# Patient Record
Sex: FEMALE | Race: BLACK OR AFRICAN AMERICAN | NOT HISPANIC OR LATINO | Employment: OTHER | URBAN - METROPOLITAN AREA
[De-identification: names, ages, dates, MRNs, and addresses within clinical notes are randomized per-mention and may not be internally consistent; named-entity substitution may affect disease eponyms.]

---

## 2023-03-21 DIAGNOSIS — I10 ESSENTIAL HYPERTENSION, BENIGN: ICD-10-CM

## 2023-03-21 DIAGNOSIS — E78.2 MIXED HYPERLIPIDEMIA: ICD-10-CM

## 2023-03-21 DIAGNOSIS — E11.9 DIABETES MELLITUS WITHOUT COMPLICATION: ICD-10-CM

## 2023-03-21 DIAGNOSIS — Z00.00 ENCOUNTER FOR MEDICARE ANNUAL WELLNESS EXAM: Primary | ICD-10-CM

## 2023-03-21 NOTE — PROGRESS NOTES
Patient called stating she needed lab orders put in for visit on 4/14. After reviewing patient's chart in [facility] (old system) patient's Medicare wellness is due. Wellness order put in and will be faxed to [facility] per patient's request. I made patient aware that orders were faxed.

## 2023-04-08 PROBLEM — I10 ESSENTIAL HYPERTENSION: Status: ACTIVE | Noted: 2023-04-08

## 2023-04-08 PROBLEM — E11.51 TYPE 2 DIABETES MELLITUS WITH DIABETIC PERIPHERAL ANGIOPATHY WITHOUT GANGRENE, WITH LONG-TERM CURRENT USE OF INSULIN: Status: ACTIVE | Noted: 2023-04-08

## 2023-04-08 PROBLEM — E78.2 MIXED HYPERLIPIDEMIA: Status: ACTIVE | Noted: 2023-04-08

## 2023-04-08 PROBLEM — F32.A ANXIETY AND DEPRESSION: Status: ACTIVE | Noted: 2023-04-08

## 2023-04-08 PROBLEM — Z79.4 TYPE 2 DIABETES MELLITUS WITH DIABETIC PERIPHERAL ANGIOPATHY WITHOUT GANGRENE, WITH LONG-TERM CURRENT USE OF INSULIN: Status: ACTIVE | Noted: 2023-04-08

## 2023-04-08 PROBLEM — M85.89 OSTEOPENIA OF MULTIPLE SITES: Status: ACTIVE | Noted: 2023-04-08

## 2023-04-08 PROBLEM — F41.9 ANXIETY AND DEPRESSION: Status: ACTIVE | Noted: 2023-04-08

## 2023-04-13 ENCOUNTER — TELEPHONE (OUTPATIENT)
Dept: FAMILY MEDICINE | Facility: CLINIC | Age: 78
End: 2023-04-13
Payer: MEDICARE

## 2023-04-13 DIAGNOSIS — I10 ESSENTIAL HYPERTENSION: ICD-10-CM

## 2023-04-13 DIAGNOSIS — E11.9 DIABETES MELLITUS WITHOUT COMPLICATION: Primary | ICD-10-CM

## 2023-04-13 DIAGNOSIS — E78.2 MIXED HYPERLIPIDEMIA: ICD-10-CM

## 2023-04-13 NOTE — TELEPHONE ENCOUNTER
----- Message from Colleen Yousif sent at 4/13/2023 11:23 AM CDT -----  Regarding: lab order  Pt requesting lab order to be completed for wellness visit to [facility] .

## 2023-04-14 LAB — HBA1C MFR BLD: 10 %

## 2023-04-20 ENCOUNTER — DOCUMENTATION ONLY (OUTPATIENT)
Dept: FAMILY MEDICINE | Facility: CLINIC | Age: 78
End: 2023-04-20
Payer: MEDICARE

## 2023-05-12 DIAGNOSIS — E11.59 TYPE 2 DIABETES MELLITUS WITH OTHER CIRCULATORY COMPLICATIONS: Primary | ICD-10-CM

## 2023-05-12 RX ORDER — POLYETHYLENE GLYCOL-3350 AND ELECTROLYTES 236; 6.74; 5.86; 2.97; 22.74 G/274.31G; G/274.31G; G/274.31G; G/274.31G; G/274.31G
POWDER, FOR SOLUTION ORAL
COMMUNITY
Start: 2023-04-19 | End: 2023-08-28

## 2023-05-12 RX ORDER — SERTRALINE HYDROCHLORIDE 25 MG/1
TABLET, FILM COATED ORAL
COMMUNITY
Start: 2023-02-24 | End: 2023-08-28

## 2023-05-12 RX ORDER — METFORMIN HYDROCHLORIDE 1000 MG/1
TABLET ORAL
COMMUNITY
Start: 2023-03-11 | End: 2023-09-13

## 2023-05-12 RX ORDER — BLOOD-GLUCOSE,RECEIVER,CONT
1 EACH MISCELLANEOUS
Qty: 2 EACH | Refills: 5 | Status: SHIPPED | OUTPATIENT
Start: 2023-05-12 | End: 2023-08-28

## 2023-05-12 RX ORDER — BLOOD-GLUCOSE TRANSMITTER
1 EACH MISCELLANEOUS
Qty: 2 EACH | Refills: 6 | Status: SHIPPED | OUTPATIENT
Start: 2023-05-12 | End: 2023-08-28

## 2023-05-12 RX ORDER — BLOOD-GLUCOSE SENSOR
1 EACH MISCELLANEOUS
Qty: 2 EACH | Refills: 6 | Status: SHIPPED | OUTPATIENT
Start: 2023-05-12 | End: 2023-08-28

## 2023-05-12 RX ORDER — OLMESARTAN MEDOXOMIL 40 MG/1
TABLET ORAL
COMMUNITY
Start: 2022-12-14 | End: 2023-08-28

## 2023-05-12 NOTE — TELEPHONE ENCOUNTER
Pt is asking for a prescription of the Dexcom. The one where she doesn't have to prink her finger.   Please advise she wants it sent to the [facility] pharmacy in [place].

## 2023-05-18 ENCOUNTER — TELEPHONE (OUTPATIENT)
Dept: FAMILY MEDICINE | Facility: CLINIC | Age: 78
End: 2023-05-18
Payer: MEDICARE

## 2023-05-24 ENCOUNTER — TELEPHONE (OUTPATIENT)
Dept: FAMILY MEDICINE | Facility: CLINIC | Age: 78
End: 2023-05-24

## 2023-05-24 NOTE — TELEPHONE ENCOUNTER
Patient called-concerns discussed-patient states that she has been taking her medication-patient instructed to make appointment-come in with her son to discuss concerns at length

## 2023-05-24 NOTE — TELEPHONE ENCOUNTER
----- Message from Colleen Yousif sent at 5/24/2023 10:27 AM CDT -----  Pt son would like doctor to give his mom a call the pt ms damaso gudino and talk to her because she is not taking her medication and he is concerned

## 2023-05-25 ENCOUNTER — TELEPHONE (OUTPATIENT)
Dept: FAMILY MEDICINE | Facility: CLINIC | Age: 78
End: 2023-05-25
Payer: MEDICARE

## 2023-05-25 NOTE — TELEPHONE ENCOUNTER
----- Message from Lilliana De La Torre MD sent at 5/22/2023  3:43 PM CDT -----  Regarding: RE: MRI orders  Please call patient and verify that she no longer wants to complete her MRI  ----- Message -----  From: Lucrecia Delong MA  Sent: 5/18/2023   9:08 AM CDT  To: Lilliana De La Torre MD  Subject: FW: MRI orders                                     ----- Message -----  From: Telma Rogers  Sent: 5/17/2023   3:56 PM CDT  To: Wil Mcintosh Staff  Subject: MRI orders                                       I am reaching out to see if this patient still needs to come in for MRI Brain? Orders were sent back in February and we were not able to reach patient. Please let me know if we need to try and schedule.

## 2023-06-07 RX ORDER — FLASH GLUCOSE SENSOR
1 KIT MISCELLANEOUS DAILY
Qty: 2 KIT | Refills: 6 | Status: SHIPPED | OUTPATIENT
Start: 2023-06-07 | End: 2024-01-08

## 2023-06-07 NOTE — TELEPHONE ENCOUNTER
Patient's son is requesting a refill on Free Style 2 sensors to go to the [facility] in [place].     He also stated that patient would like a call back to discuss MRI Results of her brain

## 2023-08-28 ENCOUNTER — OFFICE VISIT (OUTPATIENT)
Dept: FAMILY MEDICINE | Facility: CLINIC | Age: 78
End: 2023-08-28
Payer: MEDICARE

## 2023-08-28 VITALS
WEIGHT: 138 LBS | DIASTOLIC BLOOD PRESSURE: 74 MMHG | HEART RATE: 77 BPM | TEMPERATURE: 98 F | HEIGHT: 64 IN | SYSTOLIC BLOOD PRESSURE: 143 MMHG | OXYGEN SATURATION: 99 % | BODY MASS INDEX: 23.56 KG/M2

## 2023-08-28 DIAGNOSIS — I10 ESSENTIAL HYPERTENSION: ICD-10-CM

## 2023-08-28 DIAGNOSIS — E11.51 TYPE 2 DIABETES MELLITUS WITH DIABETIC PERIPHERAL ANGIOPATHY WITHOUT GANGRENE, WITH LONG-TERM CURRENT USE OF INSULIN: ICD-10-CM

## 2023-08-28 DIAGNOSIS — E78.2 MIXED HYPERLIPIDEMIA: ICD-10-CM

## 2023-08-28 DIAGNOSIS — M85.89 OSTEOPENIA OF MULTIPLE SITES: ICD-10-CM

## 2023-08-28 DIAGNOSIS — Z79.4 TYPE 2 DIABETES MELLITUS WITH DIABETIC PERIPHERAL ANGIOPATHY WITHOUT GANGRENE, WITH LONG-TERM CURRENT USE OF INSULIN: ICD-10-CM

## 2023-08-28 DIAGNOSIS — Z12.31 ENCOUNTER FOR SCREENING MAMMOGRAM FOR MALIGNANT NEOPLASM OF BREAST: ICD-10-CM

## 2023-08-28 DIAGNOSIS — Z00.00 WELLNESS EXAMINATION: Primary | ICD-10-CM

## 2023-08-28 DIAGNOSIS — F41.9 ANXIETY AND DEPRESSION: ICD-10-CM

## 2023-08-28 DIAGNOSIS — F32.A ANXIETY AND DEPRESSION: ICD-10-CM

## 2023-08-28 PROCEDURE — 3077F SYST BP >= 140 MM HG: CPT | Mod: CPTII,,, | Performed by: FAMILY MEDICINE

## 2023-08-28 PROCEDURE — G0439 PPPS, SUBSEQ VISIT: HCPCS | Mod: ,,, | Performed by: FAMILY MEDICINE

## 2023-08-28 PROCEDURE — 1160F PR REVIEW ALL MEDS BY PRESCRIBER/CLIN PHARMACIST DOCUMENTED: ICD-10-PCS | Mod: CPTII,,, | Performed by: FAMILY MEDICINE

## 2023-08-28 PROCEDURE — 1101F PT FALLS ASSESS-DOCD LE1/YR: CPT | Mod: CPTII,,, | Performed by: FAMILY MEDICINE

## 2023-08-28 PROCEDURE — 1159F PR MEDICATION LIST DOCUMENTED IN MEDICAL RECORD: ICD-10-PCS | Mod: CPTII,,, | Performed by: FAMILY MEDICINE

## 2023-08-28 PROCEDURE — 3288F FALL RISK ASSESSMENT DOCD: CPT | Mod: CPTII,,, | Performed by: FAMILY MEDICINE

## 2023-08-28 PROCEDURE — 3288F PR FALLS RISK ASSESSMENT DOCUMENTED: ICD-10-PCS | Mod: CPTII,,, | Performed by: FAMILY MEDICINE

## 2023-08-28 PROCEDURE — 1160F RVW MEDS BY RX/DR IN RCRD: CPT | Mod: CPTII,,, | Performed by: FAMILY MEDICINE

## 2023-08-28 PROCEDURE — 3078F PR MOST RECENT DIASTOLIC BLOOD PRESSURE < 80 MM HG: ICD-10-PCS | Mod: CPTII,,, | Performed by: FAMILY MEDICINE

## 2023-08-28 PROCEDURE — 1101F PR PT FALLS ASSESS DOC 0-1 FALLS W/OUT INJ PAST YR: ICD-10-PCS | Mod: CPTII,,, | Performed by: FAMILY MEDICINE

## 2023-08-28 PROCEDURE — 3078F DIAST BP <80 MM HG: CPT | Mod: CPTII,,, | Performed by: FAMILY MEDICINE

## 2023-08-28 PROCEDURE — 1159F MED LIST DOCD IN RCRD: CPT | Mod: CPTII,,, | Performed by: FAMILY MEDICINE

## 2023-08-28 PROCEDURE — G0439 PR MEDICARE ANNUAL WELLNESS SUBSEQUENT VISIT: ICD-10-PCS | Mod: ,,, | Performed by: FAMILY MEDICINE

## 2023-08-28 PROCEDURE — 3077F PR MOST RECENT SYSTOLIC BLOOD PRESSURE >= 140 MM HG: ICD-10-PCS | Mod: CPTII,,, | Performed by: FAMILY MEDICINE

## 2023-08-28 NOTE — PROGRESS NOTES
Patient ID: [id number]     Chief Complaint: Medicare AWV        HPI:     Tara Parker is a 77 y.o. female here today with her son for a Medicare Wellness.   Patient states she was hospitalized at [facility]-physician at the hospital told her to stop all her medications except for metformin-patient has not been taking any medication other than metformin 1000 mg twice a day.  Has not been monitoring her blood pressure.  Overall feels that she is doing great.  Breast Cancer Screening - Mammogram orders given to patient  Colon Cancer Screening - Colonoscopy  up-to-date  Osteoporosis Screening - DEXA  orders given to patient  Eye Exam - Recommend annually.  Dental Exam - Recommend biannually  Vaccinations -   Immunization History   Administered Date(s) Administered    COVID-19, MRNA, LN-S, PF (MODERNA FULL 0.5 ML DOSE) 04/19/2022    COVID-19, MRNA, LN-S, PF (Pfizer) (Purple Cap) 01/20/2021, 02/10/2021    Influenza - Quadrivalent - High Dose - PF (65 years and older) 10/06/2020, 10/03/2021    Influenza A (H1N1) 2009 Monovalent - IM 01/07/2010    Zoster Recombinant 09/18/2019, 10/17/2021        A separate E/M code has been provided to evaluate additional complaints that the patient would like addressed during the dedicated Medicare Wellness Exam.    Past Medical History:   Diagnosis Date    Anxiety and depression 4/8/2023    Essential hypertension 4/8/2023    Mixed hyperlipidemia 4/8/2023    Osteopenia of multiple sites 4/8/2023    Type 2 diabetes mellitus with diabetic peripheral angiopathy without gangrene, with long-term current use of insulin 4/8/2023        Past Surgical History:   Procedure Laterality Date    GI obstruction N/A     HYSTERECTOMY          Social History     Tobacco Use    Smoking status: Never    Smokeless tobacco: Never   Substance Use Topics    Alcohol use: Not Currently    Drug use: Never        Social History     Socioeconomic History    Marital status:     Number of children: 3         Current Outpatient Medications   Medication Instructions    flash glucose sensor (FREESTYLE NISHA 2 SENSOR) Kit 1 kit, Misc.(Non-Drug; Combo Route), Daily    metFORMIN (GLUCOPHAGE) 1000 MG tablet Oral       Review of patient's allergies indicates:  No Known Allergies     Patient Care Team:  Lilliana De La Torre MD as PCP - General (Family Medicine)     Subjective:     Review of Systems    12 point review of systems conducted, negative except as stated in the history of present illness. See HPI for details.    Patient Reported Health Risk Assessments:  What [age] your age[age] 70-79  Are you male or female?: Female  During the past four weeks, how much have you been bothered by emotional problems such as feeling anxious, depressed, irritable, sad, or downhearted and blue?: Not at all  During the past five weeks, has your physical and/or emotional health limited your social activities with family, friends, neighbors, or groups?: Not at all  During the past four weeks, how much bodily pain have you generally had?: No pain  During the past four weeks, was someone available to help if you needed and wanted help?: Yes, as much as I wanted  During the past four weeks, what was the hardest physical activity you could do for at least two minutes?: Moderate  Can you get to places out of walking distance without help?  (For example, can you travel alone on buses or taxis, or drive your own car?): Yes  Can you go shopping for groceries or clothes without someone's help?: Yes  Can you prepare your own meals?: Yes  Can you do your own housework without help?: Yes  Because of any health problems, do you need the help of another person with your personal care needs such as eating, bathing, dressing, or getting around the house?: No  Can you handle your own money without help?: Yes  During the past four weeks, how would you rate your health in general?: Very good  How have things been going for you during the past four weeks?: Very  "well  Are you having difficulties driving your car?: Yes, often  Do you always fasten your seat belt when you are in a car?: Yes, usually  How often in the past four weeks have you been bothered by falling or dizzy when standing up?: Never  How often in the past four weeks have you been bothered by sexual problems?: Never  How often in the past four weeks have you been bothered by trouble eating well?: Never  How often in the past four weeks have you been bothered by teeth or denture problems?: Never  How often in the past four weeks have you been bothered with problems using the telephone?: Never  How often in the past four weeks have you been bothered by tiredness or fatigue?: Never  Have you fallen two or more times in the past year?: No  Are you afraid of falling?: No  Are you a smoker?: No  During the past four weeks, how many drinks of wine, beer, or other alcoholic beverages did you have?: No alcohol at all  Do you exercise for about 20 minutes three or more days a week?: Yes, some of the time  Have you been given any information to help you with hazards in your house that might hurt you?: Yes  Have you been given any information to help you with keeping track of your medications?: Yes  How often do you have trouble taking medicines the way you've been told to take them?: I always take them as prescribed  How confident are you that you can control and manage most of your health problems?: Very confident  What is your race? (Check all that apply.):     Objective:     Visit Vitals  BP (!) 143/74   Pulse 77   Temp 98.2 °F (36.8 °C)   Ht 5' 4" (1.626 m)   Wt 62.6 kg (138 lb)   SpO2 99%   BMI 23.69 kg/m²       Physical Exam    General: Alert and oriented, No acute distress.  Head: Normocephalic, Atraumatic.  Eye: Pupils are equal, round and reactive to light, Extraocular movements are intact, Sclera non-icteric.  Ears/Nose/Throat: Normal, Mucosa moist,Clear.  Neck/Thyroid: Supple, Non-tender, No " carotid bruit, No palpable thyromegaly or thyroid nodule, No lymphadenopathy, No JVD, Full range of motion.  Respiratory: Clear to auscultation bilaterally; No wheezes, rales or rhonchi, Non-labored respirations, Symmetrical chest wall expansion.  Cardiovascular: Regular rate and rhythm  Gastrointestinal: Soft, Non-tender, Non-distended, Normal bowel sounds, No palpable organomegaly.  Musculoskeletal: Normal range of motion.  Integumentary: Warm, Dry, Intact, No suspicious lesions or rashes.  Protective Sensation (w/ 10 gram monofilament):  Right: Intact  Left: Intact    Visual Inspection:  Normal -  Bilateral    Pedal Pulses:   Right: Present  Left: Present    Posterior Tibialis Pulses:   Right:Present  Left: Present     Extremities: No clubbing, cyanosis or edema  Neurologic: No focal deficits, Cranial Nerves II-XII are grossly intact, Motor strength normal upper and lower extremities, Sensory exam intact.  Psychiatric: Normal interaction, Coherent speech, Euthymic mood, Appropriate affect       Assessment:       ICD-10-CM ICD-9-CM   1. Wellness examination  Z00.00 V70.0   2. Type 2 diabetes mellitus with diabetic peripheral angiopathy without gangrene, with long-term current use of insulin  E11.51 250.70    Z79.4 443.81     V58.67   3. Essential hypertension  I10 401.9   4. Mixed hyperlipidemia  E78.2 272.2   5. Anxiety and depression  F41.9 300.00    F32.A 311   6. Osteopenia of multiple sites  M85.89 733.90        Plan:     1. Wellness examination  Patient presented to office today for their Medicare Annual Wellness Visit.    Education was provided on health nutrition, including a diet nayan in fruits and vegetables, minimizing simple carbohydrates, salt, and saturated fats.  Encouraged regular cardiovascular exercise such as walking at lease 30 minutes daily, 5 times per week.  Emphasized preventive health measures and educated pt on fall prevention and community-based lifestule interventions to help reduce  health risks and promote healthy living.     2. Type 2 diabetes mellitus with diabetic peripheral angiopathy without gangrene, with long-term current use of insulin  Check studies management based upon results.  Pathophysiology/treatment/dangers discussed.   - Hemoglobin A1C; Future  - Urinalysis; Future  - Hepatitis C Antibody; Future  - Microalbumin/Creatinine Ratio, Urine; Future  - Urinalysis    3. Essential hypertension  Pathophysiology/Treatment/ Dangers Discussed.  Patient to monitor blood pressure at home bring readings to next visit.  - CBC Auto Differential; Future  - Comprehensive Metabolic Panel; Future    4. Mixed hyperlipidemia  Check studies management based upon results.  Pathophysiology/treatment/dangers discussed.   - Lipid Panel; Future  - TSH; Future    5. Anxiety and depression  Pathophysiology/Treatment/ Dangers Discussed.  Patient feels that she does not need to continue to take medication-has been doing well.    6. Osteopenia of multiple sites  Check studies management based upon results.  Pathophysiology/treatment/dangers discussed.   - DXA Bone Density Axial Skeleton 1 or more sites; Future    7. Encounter for screening mammogram for malignant neoplasm of breast  Check studies management based upon results.  Pathophysiology/treatment/dangers discussed.   - Mammo Digital Screening Bilat; Future          Fall Risk + Home Safety + Living Situation + Whisper Test + Depression Screen + CAGE + Cognitive Impairment Screen + ADL Screen + Timed Get Up and Go + Nutrition Screen + PAQ Screen + Health Risk Assessment all reviewed.          No data to display                  8/28/2023    12:30 PM   Fall Risk Assessment - Outpatient   Mobility Status Ambulatory   Number of falls 0   Identified as fall risk False                   Depression Screening  Over the past two weeks, has the patient felt down, depressed, or hopeless?: No  Over the past two weeks, has the patient felt little interest or pleasure  in doing things?: No  Functional Ability/Safety Screening  Was the patient's timed Up & Go test unsteady or longer than 30 seconds?: No  Does the patient need help with phone, transportation, shopping, preparing meals, housework, laundry, meds, or managing money?: No  Does the patient's home have rugs in the hallway, lack grab bars in the bathroom, lack handrails on the stairs or have poor lighting?: No  Have you noticed any hearing difficulties?: No  Cognitive Function (Assessed through direct observation with due consideration of information obtained by way of patient reports and/or concerns raised by family, friends, caretakers, or others)    Does the patient repeat questions/statements in the same day?: No  Does the patient have trouble remembering the date, year, and time?: No  Does the patient have difficulty managing finances?: No  Does the patient have a decreased sense of direction?: No      Offer of free  was accepted or rejected?: rejected    CVD Risk Factors - Reviewed  Obesity/Physical Activity - Normal BMI. Encouraged daily 30 minute physical activity x 5 days per week.    Opioid Screening: Patient medication list reviewed, patient is not taking prescription opioids. Patient is not using additional opioids than prescribed. Patient is at low risk of substance abuse based on this opioid use history.     Advance Directives:   Living Will: No        Oral Declaration: No    LaPOST: No    Do Not Resuscitate Status: No    Medical Power of : No        Oral Declaration: No      Decision Making:  Patient answered questions  Goals of Care: The patient endorses that what is most important right now is to focus on quality of life, even if it means sacrificing a little time    Accordingly, we have decided that the best plan to meet the patient's goals includes continuing with treatment    Advance Care Planning   Advanced Care Planning: I offered to discuss Advanced Care Planning, which  consists of how you would like to be cared for as you end the near of your natural life.  This includes how to pick a person who would make decisions for you if you were unable to make them for yourself, which is called a Medical Power of . These discussions include what kind of decisions you will make regarding life sustaining measures, such as ventilators and feeding tubes, when faced with a life limiting illness recorded on a living will that they will need to know.           The patient's Health Maintenance was reviewed and the following appears to be due at this time:   Health Maintenance Due   Topic Date Due    Hepatitis C Screening  Never done    Lipid Panel  Never done    TETANUS VACCINE  Never done    DEXA Scan  Never done    Pneumococcal Vaccines (Age 65+) (1 - PCV) Never done    COVID-19 Vaccine (4 - Pfizer series) 06/14/2022         Follow up in about 2 weeks (around 9/11/2023) for Diabetes. In addition to their scheduled follow up, the patient has also been instructed to follow up on as needed basis.     Future Appointments   Date Time Provider Department Center   9/20/2023  4:00 PM Lilliana De La Torre MD [facility]        Provided patient with a 5-10 year written screening schedule and personal prevention plan. Recommendations were developed using the USPSTF age appropriate recommendations. Education, counseling, and referrals were provided as needed. After Visit Summary printed and given to patient which includes a list of additional screenings\tests needed.    Lilliana De La Torre MD

## 2023-08-29 ENCOUNTER — PATIENT OUTREACH (OUTPATIENT)
Dept: ADMINISTRATIVE | Facility: HOSPITAL | Age: 78
End: 2023-08-29
Payer: MEDICARE

## 2023-08-29 LAB
ALBUMIN SERPL-MCNC: 4 G/DL (ref 3.8–4.8)
ALBUMIN/CREAT UR: 173 MG/G CREAT (ref 0–29)
ALBUMIN/GLOB SERPL: 1.4 {RATIO} (ref 1.2–2.2)
ALP SERPL-CCNC: 103 IU/L (ref 44–121)
ALT SERPL-CCNC: 7 IU/L (ref 0–32)
APPEARANCE UR: ABNORMAL
AST SERPL-CCNC: 21 IU/L (ref 0–40)
BACTERIA #/AREA URNS HPF: ABNORMAL /[HPF]
BASOPHILS # BLD AUTO: 0 X10E3/UL (ref 0–0.2)
BASOPHILS NFR BLD AUTO: 0 %
BILIRUB SERPL-MCNC: 0.2 MG/DL (ref 0–1.2)
BILIRUB UR QL STRIP: NEGATIVE
BUN SERPL-MCNC: 18 MG/DL (ref 8–27)
BUN/CREAT SERPL: 18 (ref 12–28)
CALCIUM SERPL-MCNC: 9.4 MG/DL (ref 8.7–10.3)
CHLORIDE SERPL-SCNC: 97 MMOL/L (ref 96–106)
CHOLEST SERPL-MCNC: 260 MG/DL (ref 100–199)
CO2 SERPL-SCNC: 22 MMOL/L (ref 20–29)
COLOR UR: YELLOW
CREAT SERPL-MCNC: 0.99 MG/DL (ref 0.57–1)
CREAT UR-MCNC: 150.4 MG/DL
CRYSTALS URNS MICRO: ABNORMAL
EOSINOPHIL # BLD AUTO: 0 X10E3/UL (ref 0–0.4)
EOSINOPHIL NFR BLD AUTO: 1 %
EPI CELLS #/AREA URNS HPF: ABNORMAL /HPF (ref 0–10)
ERYTHROCYTE [DISTWIDTH] IN BLOOD BY AUTOMATED COUNT: 13.6 % (ref 11.7–15.4)
EST. GFR  (NO RACE VARIABLE): 59 ML/MIN/1.73
GLOBULIN SER CALC-MCNC: 2.9 G/DL (ref 1.5–4.5)
GLUCOSE SERPL-MCNC: 277 MG/DL (ref 70–99)
GLUCOSE UR QL STRIP: ABNORMAL
HBA1C MFR BLD: 9.7 % (ref 4.8–5.6)
HCT VFR BLD AUTO: 34.3 % (ref 34–46.6)
HCV IGG SERPL QL IA: NON REACTIVE
HDLC SERPL-MCNC: 54 MG/DL
HGB BLD-MCNC: 10.9 G/DL (ref 11.1–15.9)
HGB UR QL STRIP: ABNORMAL
KETONES UR QL STRIP: NEGATIVE
LDLC SERPL CALC-MCNC: 183 MG/DL (ref 0–99)
LEUKOCYTE ESTERASE UR QL STRIP: NEGATIVE
LYMPHOCYTES # BLD AUTO: 2.2 X10E3/UL (ref 0.7–3.1)
LYMPHOCYTES NFR BLD AUTO: 55 %
MCH RBC QN AUTO: 26.9 PG (ref 26.6–33)
MCHC RBC AUTO-ENTMCNC: 31.8 G/DL (ref 31.5–35.7)
MCV RBC AUTO: 85 FL (ref 79–97)
MICRO URNS: ABNORMAL
MICROALBUMIN UR-MCNC: 259.6 UG/ML
MONOCYTES # BLD AUTO: 0.4 X10E3/UL (ref 0.1–0.9)
MONOCYTES NFR BLD AUTO: 10 %
NEUTROPHILS # BLD AUTO: 1.4 X10E3/UL (ref 1.4–7)
NEUTROPHILS NFR BLD AUTO: 34 %
NITRITE UR QL STRIP: NEGATIVE
PH UR STRIP: 5.5 [PH] (ref 5–7.5)
PLATELET # BLD AUTO: 213 X10E3/UL (ref 150–450)
POTASSIUM SERPL-SCNC: 3.9 MMOL/L (ref 3.5–5.2)
PROT SERPL-MCNC: 6.9 G/DL (ref 6–8.5)
PROT UR QL STRIP: ABNORMAL
RBC # BLD AUTO: 4.05 X10E6/UL (ref 3.77–5.28)
RBC #/AREA URNS HPF: ABNORMAL /HPF (ref 0–2)
SODIUM SERPL-SCNC: 136 MMOL/L (ref 134–144)
SP GR UR STRIP: 1.02 (ref 1–1.03)
SPECIMEN STATUS REPORT: NORMAL
TRIGL SERPL-MCNC: 129 MG/DL (ref 0–149)
TSH SERPL DL<=0.005 MIU/L-ACNC: 1.49 UIU/ML (ref 0.45–4.5)
UROBILINOGEN UR STRIP-MCNC: 0.2 MG/DL (ref 0.2–1)
VLDLC SERPL CALC-MCNC: 23 MG/DL (ref 5–40)
WBC # BLD AUTO: 4 X10E3/UL (ref 3.4–10.8)
WBC #/AREA URNS HPF: ABNORMAL /HPF (ref 0–5)

## 2023-08-29 NOTE — LETTER
"  This communication is flagged as high priority.        AUTHORIZATION FOR RELEASE OF   CONFIDENTIAL INFORMATION    Dear Truong Orta,    We are seeing Tara Parker, date of birth 1945, in the clinic at [facility]. Lilliana De La Torre MD is the patient's PCP. Tara Parker has an outstanding lab/procedure at the time we reviewed her chart. In order to help keep her health information updated, she has authorized us to request the following medical record(s):        (  )  MAMMOGRAM                                      (  )  COLONOSCOPY      (  )  PAP SMEAR                                          (  )  OUTSIDE LAB RESULTS     (  )  DEXA SCAN                                          ( xx )  DM EYE EXAM   most recent         (  )  FOOT EXAM                                          (  )  ENTIRE RECORD     (  )  OUTSIDE IMMUNIZATIONS                 (  )  _______________         Please fax records to [facility], Gautam, Indira, MD,  [phone]      If you have any questions, please contact Oskar Varner (Connie)Care Coordinator @ [phone]     Patient Name: Tara Parker  : 1945  Patient Phone #: [phone]     "

## 2023-08-29 NOTE — LETTER
"  This communication is flagged as high priority.        AUTHORIZATION FOR RELEASE OF   CONFIDENTIAL INFORMATION    Dear [facility],    We are seeing Tara Parker, date of birth 1945, in the clinic at [facility]. Lilliana De La Torre MD is the patient's PCP. Tara Parker has an outstanding lab/procedure at the time we reviewed her chart. In order to help keep her health information updated, she has authorized us to request the following medical record(s):        (  )  MAMMOGRAM                                      (  )  COLONOSCOPY      (  )  PAP SMEAR                                          (  )  OUTSIDE LAB RESULTS     (  )  DEXA SCAN                                          (xx  ) DM  EYE EXAM            (  )  FOOT EXAM                                          (  )  ENTIRE RECORD     (  )  OUTSIDE IMMUNIZATIONS                 (  )  _______________         Please fax records to [facility], Gautam, Indira, MD,  [phone]        If you have any questions, please contact Oskar Varner (Connie)Care Coordinator @ [phone]     Patient Name: Tara Parker  : 1945  Patient Phone #: [phone]     "

## 2023-08-31 ENCOUNTER — OFFICE VISIT (OUTPATIENT)
Dept: FAMILY MEDICINE | Facility: CLINIC | Age: 78
End: 2023-08-31
Payer: MEDICARE

## 2023-08-31 VITALS
SYSTOLIC BLOOD PRESSURE: 132 MMHG | WEIGHT: 135.63 LBS | OXYGEN SATURATION: 100 % | HEART RATE: 78 BPM | BODY MASS INDEX: 23.15 KG/M2 | DIASTOLIC BLOOD PRESSURE: 75 MMHG | TEMPERATURE: 98 F | HEIGHT: 64 IN

## 2023-08-31 DIAGNOSIS — I10 ESSENTIAL HYPERTENSION: ICD-10-CM

## 2023-08-31 DIAGNOSIS — Z79.4 TYPE 2 DIABETES MELLITUS WITH DIABETIC PERIPHERAL ANGIOPATHY WITHOUT GANGRENE, WITH LONG-TERM CURRENT USE OF INSULIN: ICD-10-CM

## 2023-08-31 DIAGNOSIS — E11.51 TYPE 2 DIABETES MELLITUS WITH DIABETIC PERIPHERAL ANGIOPATHY WITHOUT GANGRENE, WITH LONG-TERM CURRENT USE OF INSULIN: ICD-10-CM

## 2023-08-31 DIAGNOSIS — R41.82 ACUTE ALTERATION IN MENTAL STATUS: Primary | ICD-10-CM

## 2023-08-31 DIAGNOSIS — E78.2 MIXED HYPERLIPIDEMIA: ICD-10-CM

## 2023-08-31 PROCEDURE — 3078F DIAST BP <80 MM HG: CPT | Mod: CPTII,,, | Performed by: FAMILY MEDICINE

## 2023-08-31 PROCEDURE — 3288F FALL RISK ASSESSMENT DOCD: CPT | Mod: CPTII,,, | Performed by: FAMILY MEDICINE

## 2023-08-31 PROCEDURE — 1159F PR MEDICATION LIST DOCUMENTED IN MEDICAL RECORD: ICD-10-PCS | Mod: CPTII,,, | Performed by: FAMILY MEDICINE

## 2023-08-31 PROCEDURE — 1101F PR PT FALLS ASSESS DOC 0-1 FALLS W/OUT INJ PAST YR: ICD-10-PCS | Mod: CPTII,,, | Performed by: FAMILY MEDICINE

## 2023-08-31 PROCEDURE — 3075F PR MOST RECENT SYSTOLIC BLOOD PRESS GE 130-139MM HG: ICD-10-PCS | Mod: CPTII,,, | Performed by: FAMILY MEDICINE

## 2023-08-31 PROCEDURE — 99214 PR OFFICE/OUTPT VISIT, EST, LEVL IV, 30-39 MIN: ICD-10-PCS | Mod: ,,, | Performed by: FAMILY MEDICINE

## 2023-08-31 PROCEDURE — 3288F PR FALLS RISK ASSESSMENT DOCUMENTED: ICD-10-PCS | Mod: CPTII,,, | Performed by: FAMILY MEDICINE

## 2023-08-31 PROCEDURE — 1160F RVW MEDS BY RX/DR IN RCRD: CPT | Mod: CPTII,,, | Performed by: FAMILY MEDICINE

## 2023-08-31 PROCEDURE — 1160F PR REVIEW ALL MEDS BY PRESCRIBER/CLIN PHARMACIST DOCUMENTED: ICD-10-PCS | Mod: CPTII,,, | Performed by: FAMILY MEDICINE

## 2023-08-31 PROCEDURE — 1101F PT FALLS ASSESS-DOCD LE1/YR: CPT | Mod: CPTII,,, | Performed by: FAMILY MEDICINE

## 2023-08-31 PROCEDURE — 99214 OFFICE O/P EST MOD 30 MIN: CPT | Mod: ,,, | Performed by: FAMILY MEDICINE

## 2023-08-31 PROCEDURE — 1159F MED LIST DOCD IN RCRD: CPT | Mod: CPTII,,, | Performed by: FAMILY MEDICINE

## 2023-08-31 PROCEDURE — 3075F SYST BP GE 130 - 139MM HG: CPT | Mod: CPTII,,, | Performed by: FAMILY MEDICINE

## 2023-08-31 PROCEDURE — 3078F PR MOST RECENT DIASTOLIC BLOOD PRESSURE < 80 MM HG: ICD-10-PCS | Mod: CPTII,,, | Performed by: FAMILY MEDICINE

## 2023-09-01 ENCOUNTER — TELEPHONE (OUTPATIENT)
Dept: FAMILY MEDICINE | Facility: CLINIC | Age: 78
End: 2023-09-01
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
## 2023-09-07 ENCOUNTER — OFFICE VISIT (OUTPATIENT)
Dept: FAMILY MEDICINE | Facility: CLINIC | Age: 78
End: 2023-09-07
Payer: MEDICARE

## 2023-09-07 VITALS
OXYGEN SATURATION: 100 % | TEMPERATURE: 98 F | SYSTOLIC BLOOD PRESSURE: 156 MMHG | BODY MASS INDEX: 23.5 KG/M2 | DIASTOLIC BLOOD PRESSURE: 80 MMHG | HEART RATE: 76 BPM | HEIGHT: 64 IN | WEIGHT: 137.63 LBS

## 2023-09-07 DIAGNOSIS — F32.A ANXIETY AND DEPRESSION: ICD-10-CM

## 2023-09-07 DIAGNOSIS — I10 ESSENTIAL HYPERTENSION: ICD-10-CM

## 2023-09-07 DIAGNOSIS — R29.818 NEUROCOGNITIVE DEFICITS: ICD-10-CM

## 2023-09-07 DIAGNOSIS — E11.51 TYPE 2 DIABETES MELLITUS WITH DIABETIC PERIPHERAL ANGIOPATHY WITHOUT GANGRENE, WITH LONG-TERM CURRENT USE OF INSULIN: Primary | ICD-10-CM

## 2023-09-07 DIAGNOSIS — E78.2 MIXED HYPERLIPIDEMIA: ICD-10-CM

## 2023-09-07 DIAGNOSIS — R41.89 NEUROCOGNITIVE DEFICITS: ICD-10-CM

## 2023-09-07 DIAGNOSIS — Z79.4 TYPE 2 DIABETES MELLITUS WITH DIABETIC PERIPHERAL ANGIOPATHY WITHOUT GANGRENE, WITH LONG-TERM CURRENT USE OF INSULIN: Primary | ICD-10-CM

## 2023-09-07 DIAGNOSIS — F41.9 ANXIETY AND DEPRESSION: ICD-10-CM

## 2023-09-07 PROCEDURE — 3288F FALL RISK ASSESSMENT DOCD: CPT | Mod: CPTII,,, | Performed by: FAMILY MEDICINE

## 2023-09-07 PROCEDURE — 3077F SYST BP >= 140 MM HG: CPT | Mod: CPTII,,, | Performed by: FAMILY MEDICINE

## 2023-09-07 PROCEDURE — 1159F MED LIST DOCD IN RCRD: CPT | Mod: CPTII,,, | Performed by: FAMILY MEDICINE

## 2023-09-07 PROCEDURE — 3288F PR FALLS RISK ASSESSMENT DOCUMENTED: ICD-10-PCS | Mod: CPTII,,, | Performed by: FAMILY MEDICINE

## 2023-09-07 PROCEDURE — 3079F PR MOST RECENT DIASTOLIC BLOOD PRESSURE 80-89 MM HG: ICD-10-PCS | Mod: CPTII,,, | Performed by: FAMILY MEDICINE

## 2023-09-07 PROCEDURE — 99214 OFFICE O/P EST MOD 30 MIN: CPT | Mod: ,,, | Performed by: FAMILY MEDICINE

## 2023-09-07 PROCEDURE — 3079F DIAST BP 80-89 MM HG: CPT | Mod: CPTII,,, | Performed by: FAMILY MEDICINE

## 2023-09-07 PROCEDURE — 1101F PT FALLS ASSESS-DOCD LE1/YR: CPT | Mod: CPTII,,, | Performed by: FAMILY MEDICINE

## 2023-09-07 PROCEDURE — 1160F RVW MEDS BY RX/DR IN RCRD: CPT | Mod: CPTII,,, | Performed by: FAMILY MEDICINE

## 2023-09-07 PROCEDURE — 99214 PR OFFICE/OUTPT VISIT, EST, LEVL IV, 30-39 MIN: ICD-10-PCS | Mod: ,,, | Performed by: FAMILY MEDICINE

## 2023-09-07 PROCEDURE — 1101F PR PT FALLS ASSESS DOC 0-1 FALLS W/OUT INJ PAST YR: ICD-10-PCS | Mod: CPTII,,, | Performed by: FAMILY MEDICINE

## 2023-09-07 PROCEDURE — 1159F PR MEDICATION LIST DOCUMENTED IN MEDICAL RECORD: ICD-10-PCS | Mod: CPTII,,, | Performed by: FAMILY MEDICINE

## 2023-09-07 PROCEDURE — 1160F PR REVIEW ALL MEDS BY PRESCRIBER/CLIN PHARMACIST DOCUMENTED: ICD-10-PCS | Mod: CPTII,,, | Performed by: FAMILY MEDICINE

## 2023-09-07 PROCEDURE — 3077F PR MOST RECENT SYSTOLIC BLOOD PRESSURE >= 140 MM HG: ICD-10-PCS | Mod: CPTII,,, | Performed by: FAMILY MEDICINE

## 2023-09-07 RX ORDER — PRAVASTATIN SODIUM 40 MG/1
40 TABLET ORAL DAILY
Qty: 90 TABLET | Refills: 1 | Status: SHIPPED | OUTPATIENT
Start: 2023-09-07 | End: 2023-09-13

## 2023-09-07 RX ORDER — SERTRALINE HYDROCHLORIDE 25 MG/1
25 TABLET, FILM COATED ORAL DAILY
Qty: 90 TABLET | Refills: 0 | Status: SHIPPED | OUTPATIENT
Start: 2023-09-07 | End: 2023-12-01 | Stop reason: SDUPTHER

## 2023-09-07 RX ORDER — OLMESARTAN MEDOXOMIL 40 MG/1
40 TABLET ORAL DAILY
Qty: 90 TABLET | Refills: 3 | Status: SHIPPED | OUTPATIENT
Start: 2023-09-07 | End: 2023-12-11 | Stop reason: SDUPTHER

## 2023-09-07 RX ORDER — INSULIN GLARGINE 100 [IU]/ML
40 INJECTION, SOLUTION SUBCUTANEOUS DAILY
Qty: 12 ML | Refills: 1 | Status: SHIPPED | OUTPATIENT
Start: 2023-09-07 | End: 2023-10-11 | Stop reason: SDUPTHER

## 2023-09-08 NOTE — PROGRESS NOTES
"   Patient ID: [id number]     Chief Complaint: Diabetes      HPI:     Tara Parker is a 78 y.o. female here today with her son  for a follow up.  After evaluation by healthcare provider at [facility]-patient's son decided against admission to [facility]-would like to take care of mother at home.  Patient is only taking metformin 1000 mg twice a day  Past Medical History:   Diagnosis Date    Anxiety and depression 4/8/2023    Essential hypertension 4/8/2023    Mixed hyperlipidemia 4/8/2023    Osteopenia of multiple sites 4/8/2023    Type 2 diabetes mellitus with diabetic peripheral angiopathy without gangrene, with long-term current use of insulin 4/8/2023        Past Surgical History:   Procedure Laterality Date    GI obstruction N/A     HYSTERECTOMY          Social History     Tobacco Use    Smoking status: Never    Smokeless tobacco: Never   Substance Use Topics    Alcohol use: Not Currently    Drug use: Never        Social History     Socioeconomic History    Marital status:     Number of children: 3        Current Outpatient Medications   Medication Instructions    flash glucose sensor (FREESTYLE NISHA 2 SENSOR) Kit 1 kit, Misc.(Non-Drug; Combo Route), Daily    insulin (LANTUS SOLOSTAR U-100 INSULIN) 40 Units, Subcutaneous, Daily    metFORMIN (GLUCOPHAGE) 1000 MG tablet Oral    olmesartan (BENICAR) 40 mg, Oral, Daily    pravastatin (PRAVACHOL) 40 mg, Oral, Daily    sertraline (ZOLOFT) 25 mg, Oral, Daily       Review of patient's allergies indicates:  No Known Allergies     Patient Care Team:  Lilliana De La Torre MD as PCP - General (Family Medicine)       Subjective:     Review of Systems    12 point review of systems conducted, negative except as stated in the history of present illness. See HPI for details.      Objective:     Visit Vitals  BP (!) 156/80 (BP Location: Right arm, Patient Position: Sitting)   Pulse 76   Temp 97.7 °F (36.5 °C)   Ht 5' 4" (1.626 m)   Wt 62.4 kg (137 lb 9.6 oz)   SpO2 100%   BMI " 23.62 kg/m²       Physical Exam    General: Alert and oriented, No acute distress.  Head: Normocephalic, Atraumatic.  Eye: Pupils are equal, round and reactive to light, Extraocular movements are intact, Sclera non-icteric.  Ears/Nose/Throat: Normal, Mucosa moist,Clear.  Neck/Thyroid: Supple, Full range of motion.  Respiratory: Clear to auscultation bilaterally  Cardiovascular: Regular rate and rhythm  Gastrointestinal: Soft, Non-tender, Non-distended, Normal bowel sounds  Musculoskeletal: Normal range of motion.  Integumentary: Warm, Dry, Intact  Extremities: No clubbing, cyanosis or edema  Neurologic: No focal deficits, Cranial Nerves II-XII are grossly intact  Psychiatric: Normal interaction, Coherent speech,   Assessment:       ICD-10-CM ICD-9-CM   1. Type 2 diabetes mellitus with diabetic peripheral angiopathy without gangrene, with long-term current use of insulin  E11.51 250.70    Z79.4 443.81     V58.67   2. Essential hypertension  I10 401.9   3. Mixed hyperlipidemia  E78.2 272.2   4. Anxiety and depression  F41.9 300.00    F32.A 311   5. Neurocognitive deficits  R29.818 781.99    R41.89         Plan:     1. Type 2 diabetes mellitus with diabetic peripheral angiopathy without gangrene, with long-term current use of insulin  Lab Results   Component Value Date    HGBA1C 9.7 (H) 08/28/2023    HGBA1C 10.0 04/04/2023    CREATININE 0.99 08/28/2023      Pathophysiology/treatment/dangers discussed.   Patient to continue medication-metformin 1000 mg twice a day-restart insulin at 20 units a day-call office with blood sugar readings in 3 days.  Blood sugar goal of less than 120 fasting and 140-150 about 2 hours after meal.   Current HA1C is 9.7. Hemoglobin A1c needs to be rechecked in 3 months. Goal less than 6.5.  Follow ADA Diet. Avoid soda, simple sweets, and limit rice/pasta/breads/starches (no more than 45-50 grams per meal).  Maintain healthy weight with goal BMI <30.  Exercise 5 times per week for 30 minutes  per day.  Stressed importance of daily foot exams.Stressed importance of annual dilated eye exam.   - insulin (LANTUS SOLOSTAR U-100 INSULIN) glargine 100 units/mL SubQ pen; Inject 40 Units into the skin once daily.  Dispense: 12 mL; Refill: 01    2. Essential hypertension  Patient to restart medication. Blood pressure goal of less than 130/80. Continue to encourage diet and activity modifications. Including stress management. Pathophysiology/treatment/dangers discussed.   - olmesartan (BENICAR) 40 MG tablet; Take 1 tablet (40 mg total) by mouth once daily.  Dispense: 90 tablet; Refill: 3    3. Mixed hyperlipidemia  Lab Results   Component Value Date    CHOL 260 (H) 08/28/2023    TRIG 129 08/28/2023    HDL 54 08/28/2023     Pathophysiology/treatment/dangers discussed. Patient to continue diet modification/restart pravastatin-patient has been unable to tolerate Crestor-Zocor-Lipitor.   Total cholesterol 260 ( Goal less than 200) HDL 54 ( Goal high as possible)  ( Goal less than 100) Triglycerides 129 ( Goal less than 150)   - pravastatin (PRAVACHOL) 40 MG tablet; Take 1 tablet (40 mg total) by mouth once daily.  Dispense: 90 tablet; Refill: 1    4. Anxiety and depression  Depression/Anxiety: Patient to restart Zoloft-call office with update in 1 week.   - sertraline (ZOLOFT) 25 MG tablet; Take 1 tablet (25 mg total) by mouth once daily.  Dispense: 90 tablet; Refill: 0    5. Neurocognitive deficits  Per family's request referral to neurologist-treatment options discussed-Lifestyle modifications discussed.  Patient instructed not to drive-until further evaluation patient should not live by herself.  - Ambulatory referral/consult to Neurology; Future         Follow up in about 6 weeks (around 10/19/2023). In addition to their scheduled follow up, the patient has also been instructed to follow up on as needed basis.     Future Appointments   Date Time Provider Department Center   10/23/2023 10:00 AM Wil  MD Lilliana [facility] VAL De La Torre MD

## 2023-09-11 ENCOUNTER — TELEPHONE (OUTPATIENT)
Dept: FAMILY MEDICINE | Facility: CLINIC | Age: 78
End: 2023-09-11

## 2023-09-11 NOTE — TELEPHONE ENCOUNTER
Patient's son (Shaka Parker) called requesting a call back to go over patient's medication. Patient's call back number is [phone]

## 2023-09-12 ENCOUNTER — OFFICE VISIT (OUTPATIENT)
Dept: FAMILY MEDICINE | Facility: CLINIC | Age: 78
End: 2023-09-12
Payer: MEDICARE

## 2023-09-12 VITALS
SYSTOLIC BLOOD PRESSURE: 142 MMHG | OXYGEN SATURATION: 97 % | HEART RATE: 91 BPM | TEMPERATURE: 98 F | DIASTOLIC BLOOD PRESSURE: 70 MMHG | HEIGHT: 64 IN | WEIGHT: 139.38 LBS | BODY MASS INDEX: 23.79 KG/M2

## 2023-09-12 DIAGNOSIS — I10 ESSENTIAL HYPERTENSION: Primary | ICD-10-CM

## 2023-09-12 DIAGNOSIS — E11.51 TYPE 2 DIABETES MELLITUS WITH DIABETIC PERIPHERAL ANGIOPATHY WITHOUT GANGRENE, WITH LONG-TERM CURRENT USE OF INSULIN: ICD-10-CM

## 2023-09-12 DIAGNOSIS — F32.A ANXIETY AND DEPRESSION: ICD-10-CM

## 2023-09-12 DIAGNOSIS — E78.2 MIXED HYPERLIPIDEMIA: ICD-10-CM

## 2023-09-12 DIAGNOSIS — Z79.4 TYPE 2 DIABETES MELLITUS WITH DIABETIC PERIPHERAL ANGIOPATHY WITHOUT GANGRENE, WITH LONG-TERM CURRENT USE OF INSULIN: ICD-10-CM

## 2023-09-12 DIAGNOSIS — F41.9 ANXIETY AND DEPRESSION: ICD-10-CM

## 2023-09-12 DIAGNOSIS — L81.9 SKIN HYPOPIGMENTATION: ICD-10-CM

## 2023-09-12 PROCEDURE — 1160F PR REVIEW ALL MEDS BY PRESCRIBER/CLIN PHARMACIST DOCUMENTED: ICD-10-PCS | Mod: CPTII,,, | Performed by: FAMILY MEDICINE

## 2023-09-12 PROCEDURE — 3077F SYST BP >= 140 MM HG: CPT | Mod: CPTII,,, | Performed by: FAMILY MEDICINE

## 2023-09-12 PROCEDURE — 3077F PR MOST RECENT SYSTOLIC BLOOD PRESSURE >= 140 MM HG: ICD-10-PCS | Mod: CPTII,,, | Performed by: FAMILY MEDICINE

## 2023-09-12 PROCEDURE — 3078F DIAST BP <80 MM HG: CPT | Mod: CPTII,,, | Performed by: FAMILY MEDICINE

## 2023-09-12 PROCEDURE — 99214 OFFICE O/P EST MOD 30 MIN: CPT | Mod: ,,, | Performed by: FAMILY MEDICINE

## 2023-09-12 PROCEDURE — 3078F PR MOST RECENT DIASTOLIC BLOOD PRESSURE < 80 MM HG: ICD-10-PCS | Mod: CPTII,,, | Performed by: FAMILY MEDICINE

## 2023-09-12 PROCEDURE — 99214 PR OFFICE/OUTPT VISIT, EST, LEVL IV, 30-39 MIN: ICD-10-PCS | Mod: ,,, | Performed by: FAMILY MEDICINE

## 2023-09-12 PROCEDURE — 1160F RVW MEDS BY RX/DR IN RCRD: CPT | Mod: CPTII,,, | Performed by: FAMILY MEDICINE

## 2023-09-12 PROCEDURE — 1159F MED LIST DOCD IN RCRD: CPT | Mod: CPTII,,, | Performed by: FAMILY MEDICINE

## 2023-09-12 PROCEDURE — 1159F PR MEDICATION LIST DOCUMENTED IN MEDICAL RECORD: ICD-10-PCS | Mod: CPTII,,, | Performed by: FAMILY MEDICINE

## 2023-09-12 RX ORDER — BLOOD-GLUCOSE SENSOR
1 EACH MISCELLANEOUS
Qty: 3 EACH | Refills: 3 | Status: SHIPPED | OUTPATIENT
Start: 2023-09-12 | End: 2023-10-25 | Stop reason: SDUPTHER

## 2023-09-13 ENCOUNTER — TELEPHONE (OUTPATIENT)
Dept: FAMILY MEDICINE | Facility: CLINIC | Age: 78
End: 2023-09-13

## 2023-09-13 NOTE — PROGRESS NOTES
Patient ID: [id number]     Chief Complaint:  Medication    HPI:     Tara Parker is a 78 y.o. female here today with her son- for clarification on medications.  Most of history obtained from son and patient's niece.  1) Hypertension: Patient has been taking medicine daily. BP is much lower at home-patient is not having any symptoms related to increased blood pressure.  2) Type 2 DM: Patient has been taking 15 units of insulin-fasting blood sugars have consistently been below 140-son is concerned about use of metformin-in the past patient was taken to the hospital because of low blood sugar-concerns about metformin causing the problem.   3)  Depression/Anxiety: Patient has been refusing to take Zoloft.  4) Patient does not want to take pravastatin-feels that the medication has caused-areas of hypopigmentation on her leg-family would like patient to be evaluated by dermatologist.  Past Medical History:   Diagnosis Date    Anxiety and depression 4/8/2023    Essential hypertension 4/8/2023    Mixed hyperlipidemia 4/8/2023    Osteopenia of multiple sites 4/8/2023    Type 2 diabetes mellitus with diabetic peripheral angiopathy without gangrene, with long-term current use of insulin 4/8/2023        Past Surgical History:   Procedure Laterality Date    GI obstruction N/A     HYSTERECTOMY          Social History     Tobacco Use    Smoking status: Never    Smokeless tobacco: Never   Substance Use Topics    Alcohol use: Not Currently    Drug use: Never        Social History     Socioeconomic History    Marital status:     Number of children: 3        Current Outpatient Medications   Medication Instructions    blood-glucose sensor (FREESTYLE NISHA 3 SENSOR) Suzanne 1 Device, Misc.(Non-Drug; Combo Route), Every 14 days    flash glucose sensor (FREESTYLE NISHA 2 SENSOR) Kit 1 kit, Misc.(Non-Drug; Combo Route), Daily    insulin (LANTUS SOLOSTAR U-100 INSULIN) 40 Units, Subcutaneous, Daily    metFORMIN (GLUCOPHAGE) 1000 MG  "tablet Oral    olmesartan (BENICAR) 40 mg, Oral, Daily    pravastatin (PRAVACHOL) 40 mg, Oral, Daily    sertraline (ZOLOFT) 25 mg, Oral, Daily       Review of patient's allergies indicates:  No Known Allergies     Patient Care Team:  Lilliana De La Torre MD as PCP - General (Family Medicine)       Subjective:     Review of Systems    12 point review of systems conducted, negative except as stated in the history of present illness. See HPI for details.      Objective:     Visit Vitals  BP (!) 142/70 (BP Location: Left arm, Patient Position: Lying)   Pulse 91   Temp 98 °F (36.7 °C)   Ht 5' 4" (1.626 m)   Wt 63.2 kg (139 lb 6.4 oz)   SpO2 97%   BMI 23.93 kg/m²       Physical Exam    General: Alert, No acute distress.  Head: Normocephalic, Atraumatic.  Eye: Pupils are equal, round and reactive to light, Extraocular movements are intact, Sclera non-icteric.  Ears/Nose/Throat: Normal, Mucosa moist,Clear.  Neck/Thyroid: Supple.  Respiratory: Clear to auscultation bilaterally  Cardiovascular: Regular rate and rhythm  Gastrointestinal: Soft, Non-tender, Non-distended, Normal bowel sounds  Musculoskeletal: Normal range of motion.  Integumentary: Warm, Dry, Intact  Extremities: No clubbing, cyanosis or edema  Neurologic: No focal deficits, Cranial Nerves II-XII are grossly intact, Motor strength normal upper and lower extremities, Sensory exam intact.  Psychiatric: Normal interaction, Coherent speech, Euthymic mood, Appropriate affect       Assessment:       ICD-10-CM ICD-9-CM   1. Essential hypertension  I10 401.9   2. Type 2 diabetes mellitus with diabetic peripheral angiopathy without gangrene, with long-term current use of insulin  E11.51 250.70    Z79.4 443.81     V58.67   3. Mixed hyperlipidemia  E78.2 272.2   4. Anxiety and depression  F41.9 300.00    F32.A 311   5. Skin hypopigmentation  L81.9 709.00        Plan:     1. Essential hypertension  Patient has been taking medication. Blood pressure goal of less than 130/80-blood " pressure is much lower patient to continue taking medication. Pathophysiology/treatment/dangers discussed.     2. Type 2 diabetes mellitus with diabetic peripheral angiopathy without gangrene, with long-term current use of insulin  Per family's comfort and patient's comfort-patient to stop taking metformin-continue with Lantus-call office with blood sugar readings in 3 days.  Management based on blood sugars.  Per family's request referral to cardiologist.  - Ambulatory referral/consult to Cardiology; Future    3. Mixed hyperlipidemia  Per patient's request and concerns-patient to stop taking pravastatin.  Patient and family has been made aware of risk associated with elevated cholesterol.    4. Anxiety and depression  Encouraged patient to restart Zoloft-awaiting Neurology input.      5. Skin hypopigmentation  Areas of hypopigmentation related to aging-per family's request referral to dermatologist.  - Ambulatory referral/consult to Dermatology; Future      No follow-ups on file. In addition to their scheduled follow up, the patient has also been instructed to follow up on as needed basis.     Future Appointments   Date Time Provider Department Center   9/20/2023  2:30 PM Eddy Garcia AGACNP-[address]   10/23/2023 10:00 AM Lilliana De La Torre MD [facility] Endy De La Torre MD

## 2023-09-13 NOTE — TELEPHONE ENCOUNTER
Patient called with her sugar level and blood pressure reading.    Sugar- 116  B/P- 139/86  Pulse- 74

## 2023-09-20 ENCOUNTER — OFFICE VISIT (OUTPATIENT)
Dept: NEUROLOGY | Facility: CLINIC | Age: 78
End: 2023-09-20
Payer: MEDICARE

## 2023-09-20 DIAGNOSIS — R29.818 NEUROCOGNITIVE DEFICITS: ICD-10-CM

## 2023-09-20 DIAGNOSIS — R41.89 NEUROCOGNITIVE DEFICITS: ICD-10-CM

## 2023-09-20 DIAGNOSIS — F03.90 DEMENTIA, UNSPECIFIED DEMENTIA SEVERITY, UNSPECIFIED DEMENTIA TYPE, UNSPECIFIED WHETHER BEHAVIORAL, PSYCHOTIC, OR MOOD DISTURBANCE OR ANXIETY: Primary | ICD-10-CM

## 2023-09-20 PROCEDURE — 1159F MED LIST DOCD IN RCRD: CPT | Mod: CPTII,S$GLB,, | Performed by: NURSE PRACTITIONER

## 2023-09-20 PROCEDURE — 99999 PR PBB SHADOW E&M-EST. PATIENT-LVL III: CPT | Mod: PBBFAC,,, | Performed by: NURSE PRACTITIONER

## 2023-09-20 PROCEDURE — 1159F PR MEDICATION LIST DOCUMENTED IN MEDICAL RECORD: ICD-10-PCS | Mod: CPTII,S$GLB,, | Performed by: NURSE PRACTITIONER

## 2023-09-20 PROCEDURE — 1160F RVW MEDS BY RX/DR IN RCRD: CPT | Mod: CPTII,S$GLB,, | Performed by: NURSE PRACTITIONER

## 2023-09-20 PROCEDURE — 99215 OFFICE O/P EST HI 40 MIN: CPT | Mod: S$GLB,,, | Performed by: NURSE PRACTITIONER

## 2023-09-20 PROCEDURE — 1160F PR REVIEW ALL MEDS BY PRESCRIBER/CLIN PHARMACIST DOCUMENTED: ICD-10-PCS | Mod: CPTII,S$GLB,, | Performed by: NURSE PRACTITIONER

## 2023-09-20 PROCEDURE — 99215 PR OFFICE/OUTPT VISIT, EST, LEVL V, 40-54 MIN: ICD-10-PCS | Mod: S$GLB,,, | Performed by: NURSE PRACTITIONER

## 2023-09-20 PROCEDURE — 99999 PR PBB SHADOW E&M-EST. PATIENT-LVL III: ICD-10-PCS | Mod: PBBFAC,,, | Performed by: NURSE PRACTITIONER

## 2023-09-20 NOTE — PROGRESS NOTES
Neurology Note - Initial Encounter    Subjective:         Patient ID: Tara Parker is a 78 y.o. female.    Chief Complaint: NP ref by Dr De La Torre for neuro cons to [facility].     HPI:            Son states pt had a decline in her memory for abt 3 yrs.   Son states pts boyfriend passed away and her memory continued to decline. Pt got lost in [place] trying to get back to [place] after taking a detour.   Pt spent the night in her car at a truck stop in [place].     Son put out a missing alert and a  told the pt she had a missing person alert and thept was on the phone and the person on the phone spoke w the  and told her where she was.    Appetite:   Mood: depressed   Sleep: sleeps well   Urinary:  Bowel:   Lives alone     MRIb w/o 05/22/2023 -  Global age appropriate brain atrophy.    Hep c nonreactive  TSH nml  A1C 9.7  Chol  260    CBC/CMP reviewed - glucose elevated; otherwise ok     ROS: as per HPI, otherwise pertinent systems review is negative          Past Medical History:   Diagnosis Date    Anxiety and depression 4/8/2023    Essential hypertension 4/8/2023    Mixed hyperlipidemia 4/8/2023    Osteopenia of multiple sites 4/8/2023    Type 2 diabetes mellitus with diabetic peripheral angiopathy without gangrene, with long-term current use of insulin 4/8/2023       Past Surgical History:   Procedure Laterality Date    GI obstruction N/A     HYSTERECTOMY         Family History   Problem Relation Age of Onset    Coronary artery disease Mother 85    Lung disease Father 77        Chronic Obstructive Lung Disease       Social History     Socioeconomic History    Marital status:     Number of children: 3   Occupational History    Occupation: Retired: [employer]   Tobacco Use    Smoking status: Never    Smokeless tobacco: Never   Substance and Sexual Activity    Alcohol use: Not Currently    Drug use: Never    Sexual activity: Not Currently     Partners: Male       Review of patient's  allergies indicates:  No Known Allergies    Current Outpatient Medications   Medication Instructions              insulin (LANTUS SOLOSTAR U-100 INSULIN) 40 Units, Subcutaneous, Daily    olmesartan (BENICAR) 40 mg, Oral, Daily    sertraline (ZOLOFT) 25 mg, Oral, Daily         Objective:      Exam:   There were no vitals taken for this visit.    Physical Exam  Vitals reviewed.   Constitutional:       Appearance: Normal appearance.      Accompanied by: Pts son (Shaka)  is here today.   HENT:      Ears:      Comments: Hearing normal.     Mallampati:  Eyes:      Extraocular Movements: Extraocular movements intact.      VF's ok     PERRLA  Cardiovascular:      Rate and Rhythm: Normal rate and regular rhythm.   Pulmonary:      Effort: Pulmonary effort is normal.      Breath sounds: Normal breath sounds.   Musculoskeletal:         General: Normal range of motion.   Skin:     General: Skin is warm and dry.   Neurological:      General: No focal deficit present.      Mental Status: alert; MMSE 17/30     Speech: nml     Face: symmetric; tongue midline; cheek puff nml     Motor: nonlateralizing; unable to stand from chair with arms folded     Coordination: F to N ok, no dysmetria     Reflexes: trace B bicep/kjs; absent ajs      Sensation: Vib nml     Tone: No ankle clonus; no cogwheel rigidity      Tremor: none     Gait: unassisted; not parkinsonian; tip toe/heel/tandem with handheld assist   Psychiatric:         Mood and Affect: Mood normal.         Behavior: Behavior normal.       Dr. Zapata physically present in exam room during patient encounter.         Assessment/Plan:     Problem List Items Addressed This Visit          Neuro    Neurocognitive deficits    Dementia - Primary    Differential dx discussed including but not limited to neurodegenerative process such as AD vs FTD vs vascular component.     MRIb w/o contrast -  global atrophy     Dr. Zapata physically present in exam room during patient encounter.  Discussion surrounding tests such as CSF analysis of molecular biomarkers of A? protein deposition including A?42 and CSF total tau and phospho-tau; amyloid PET imaging also discussed.    Dicussed medications such as Memantine and Donepezil; given limited efficacy and associated risk/SE's, collectively decided to hold off at this time.     Per family request, refer to [facility]     Medication administration personally reviewed with patient by MD/provider. Potential or actual medication changes discussed. Common and potentially serious side effects of medications or medication changes discussed.    I advise she stop driving    She has family looking out for her safety; they have cameras in the house that they access and watch over her throughout the day.     Son Shaka is POA    PCP will generate referral to  per fam report    Would be ideal that she not live alone in our opinion     Follow up here in 6 months          Eddy Garcia, MSN, APRN, AGACNP-BC

## 2023-10-06 ENCOUNTER — TELEPHONE (OUTPATIENT)
Dept: FAMILY MEDICINE | Facility: CLINIC | Age: 78
End: 2023-10-06
Payer: MEDICARE

## 2023-10-06 NOTE — TELEPHONE ENCOUNTER
Called pt and son answered. I voiced all her medications was sent to [facility].

## 2023-10-06 NOTE — TELEPHONE ENCOUNTER
----- Message from Colleen Yousif sent at 10/5/2023  4:24 PM CDT -----  Pt is requesting all her medication that was called in to go to [facility] in [facility] , patient said she inform doctor of pharmacy on last visit and need all her medication to be called in to [facility] because they where sent in [place] and that's not where she live

## 2023-10-11 DIAGNOSIS — E11.51 TYPE 2 DIABETES MELLITUS WITH DIABETIC PERIPHERAL ANGIOPATHY WITHOUT GANGRENE, WITH LONG-TERM CURRENT USE OF INSULIN: ICD-10-CM

## 2023-10-11 DIAGNOSIS — Z79.4 TYPE 2 DIABETES MELLITUS WITH DIABETIC PERIPHERAL ANGIOPATHY WITHOUT GANGRENE, WITH LONG-TERM CURRENT USE OF INSULIN: ICD-10-CM

## 2023-10-11 RX ORDER — INSULIN GLARGINE 100 [IU]/ML
40 INJECTION, SOLUTION SUBCUTANEOUS DAILY
Qty: 12 ML | Refills: 1 | Status: SHIPPED | OUTPATIENT
Start: 2023-10-11 | End: 2024-01-08

## 2023-10-25 DIAGNOSIS — Z79.4 TYPE 2 DIABETES MELLITUS WITH DIABETIC PERIPHERAL ANGIOPATHY WITHOUT GANGRENE, WITH LONG-TERM CURRENT USE OF INSULIN: Primary | ICD-10-CM

## 2023-10-25 DIAGNOSIS — E11.51 TYPE 2 DIABETES MELLITUS WITH DIABETIC PERIPHERAL ANGIOPATHY WITHOUT GANGRENE, WITH LONG-TERM CURRENT USE OF INSULIN: Primary | ICD-10-CM

## 2023-10-25 RX ORDER — BLOOD-GLUCOSE SENSOR
1 EACH MISCELLANEOUS
Qty: 3 EACH | Refills: 3 | Status: SHIPPED | OUTPATIENT
Start: 2023-10-25 | End: 2024-01-08 | Stop reason: SDUPTHER

## 2023-12-01 DIAGNOSIS — F41.9 ANXIETY AND DEPRESSION: ICD-10-CM

## 2023-12-01 DIAGNOSIS — F32.A ANXIETY AND DEPRESSION: ICD-10-CM

## 2023-12-01 NOTE — TELEPHONE ENCOUNTER
Patient's niece called stating that the Cardiologist (Dr. Boyce) saw patient and stated that she needed to get back on her cholesterol medicine, and he didn't put her on anything because he wants primary care to put patient on what she was on before. She is also requesting a refill on patient's Zoloft 25mg to be sent to the [facility] for a 90 day supply

## 2023-12-02 RX ORDER — SERTRALINE HYDROCHLORIDE 25 MG/1
25 TABLET, FILM COATED ORAL DAILY
Qty: 90 TABLET | Refills: 0 | Status: SHIPPED | OUTPATIENT
Start: 2023-12-02 | End: 2024-01-08 | Stop reason: DRUGHIGH

## 2023-12-06 ENCOUNTER — PATIENT OUTREACH (OUTPATIENT)
Dept: ADMINISTRATIVE | Facility: HOSPITAL | Age: 78
End: 2023-12-06
Payer: MEDICARE

## 2023-12-11 DIAGNOSIS — I10 ESSENTIAL HYPERTENSION: ICD-10-CM

## 2023-12-11 RX ORDER — OLMESARTAN MEDOXOMIL 40 MG/1
40 TABLET ORAL DAILY
Qty: 90 TABLET | Refills: 3 | Status: SHIPPED | OUTPATIENT
Start: 2023-12-11 | End: 2024-03-25 | Stop reason: SDUPTHER

## 2023-12-18 NOTE — PROGRESS NOTES
Population Health Outreach. Human   Care Gap     Follow up call regarding BP reading, no ans. unable to leave a message.

## 2024-01-03 ENCOUNTER — OFFICE VISIT (OUTPATIENT)
Dept: NEUROLOGY | Facility: CLINIC | Age: 79
End: 2024-01-03
Payer: MEDICARE

## 2024-01-03 ENCOUNTER — OUTPATIENT CASE MANAGEMENT (OUTPATIENT)
Dept: NEUROLOGY | Facility: CLINIC | Age: 79
End: 2024-01-03
Payer: MEDICARE

## 2024-01-03 VITALS
SYSTOLIC BLOOD PRESSURE: 180 MMHG | DIASTOLIC BLOOD PRESSURE: 82 MMHG | HEIGHT: 64 IN | HEART RATE: 73 BPM | BODY MASS INDEX: 23.88 KG/M2 | WEIGHT: 139.88 LBS

## 2024-01-03 DIAGNOSIS — G30.1 MODERATE LATE ONSET ALZHEIMER'S DEMENTIA WITH MOOD DISTURBANCE: Primary | ICD-10-CM

## 2024-01-03 DIAGNOSIS — F02.B3 MODERATE LATE ONSET ALZHEIMER'S DEMENTIA WITH MOOD DISTURBANCE: Primary | ICD-10-CM

## 2024-01-03 PROCEDURE — 96116 NUBHVL XM PHYS/QHP 1ST HR: CPT | Mod: S$GLB,,, | Performed by: CLINICAL NEUROPSYCHOLOGIST

## 2024-01-03 PROCEDURE — 96133 NRPSYC TST EVAL PHYS/QHP EA: CPT | Mod: S$GLB,,, | Performed by: CLINICAL NEUROPSYCHOLOGIST

## 2024-01-03 PROCEDURE — 96132 NRPSYC TST EVAL PHYS/QHP 1ST: CPT | Mod: S$GLB,,, | Performed by: CLINICAL NEUROPSYCHOLOGIST

## 2024-01-03 PROCEDURE — 99999 PR PBB SHADOW E&M-EST. PATIENT-LVL III: CPT | Mod: PBBFAC,,,

## 2024-01-03 PROCEDURE — 96138 PSYCL/NRPSYC TECH 1ST: CPT | Mod: S$GLB,,, | Performed by: CLINICAL NEUROPSYCHOLOGIST

## 2024-01-03 NOTE — PROGRESS NOTES
INTERDISCIPLINARY MEMORY ASSESSMENT CLINIC  Neuropsychology Visit    Referral Information  Name: Tara Parker  MRN: [id number]  : 1945  Age: 78 y.o.  Gender: female  Referring Provider: Lillie Romero, Brayan  [address] [place] [address]  Referral Reason/Medical Necessity: Dementia evaluation    SUMMARY/TREATMENT PLAN   Results from interdisciplinary Memory Clinic Evaluation (neuropsych testing, clinical interviews, neurology exam, review of records) indicate the following diagnoses and treatment plan recommendations. This was preliminarily discussed with the son but full discussion/plan with patient/family at separately scheduled visit.    Diagnoses/Plan:  Problem List Items Addressed This Visit          Neuro    Moderate late onset Alzheimer's dementia with mood disturbance - Primary    Overview     - Memory Clinic: MoCA=         Current Assessment & Plan     Assessment:  -Cognitive Testing:   Brief Screen MoCA= (disoriented to time)  REGINE=12   More Detailed Measures NA   -Neuro/Medical Exam: See same day Neuro note    -Etiology/Stage/Present Concerns:  Etiology Very likely mixed dementia (Alzheimer's and vascular dz)   Stage/Severity  Moderate stage now   Behavioral Issues Agitation, paranoia, and resistance to help are primary issues right now.   Level of Support Recommendations Overall: Family is managing but they are overwhelmed. Will discuss with son and family about living recommendations, management recs, and so forth.  Health/Medications: Family must handle completely  Driving: She is not driving anymore  Home: Ideally, living with a relative or someone living with her.   Finances: Family is handling   Caregiver concerns Primarily, education around stage and supervision needs.         Plan:  Memory Clinic Follow-up Plan 12-months for annual assessment and PRN vs continuing in [place]. Will discuss what family wants.   Neuropsych:  Will review results, will provide  "education/support around diagnosis and needs, and will discuss plan of care in a separately scheduled follow-up session   Neurology See Note   Care Management Social Work: Jessica to check in with son   Family/Caregiver plan Behavioral Symptoms: Will discuss various caregiver-implemented behavioral strategies to address paranoia, agitation, and resistance to help  Routine/Structure: Will discuss need to implement routine / structure with daily and weekly schedule that is consistent.  Supervision Needs: Will discuss supervision recommendations with family.    Brain Health Strategies for family to organize and prompt/monitor Exercise/Physical Activity: Unless medically not able, daily physical activity is very helpful for physical and mental health for patients with cognitive trouble.   Diet: Talk with your physician or nutritionist about whats right for you, but a Mediterranean diet has been found to be most effective at promoting brain health  Activity: Explore various activities beyond television for mental stimulation. This can include: music, art, simple games, and so forth.   Socialization: Opportunities to see family and friends stimulates mood and our mind. Sometimes patients need fewer people or certain people to improve engagement.                  ELIU Kurtz II, Ph.D., ABPP-CN  Board Certified Clinical Neuropsychologist  Co-Director, Cognitive Disorders and Brain Health Program  Department of Neurology and Neurosciences  [facility]    HISTORY OF PRESENT ILLNESS      Cognitive Symptoms     Onset & Course:   -Pt: "sometime if you  things or if you get a phone call..." She reports some trouble with feeling scattered.   -Son: Gradual onset 4-years ago with a progressive decline. Sxs were primarily short-term memory. More accelerated after ping  in  with various functional problems (driving problems like getting lost, missing payments). Across , more noticeable decline (severe " memory trouble, executive dysfunction, word finding trouble) particularly with need for managing all IADLs.     Fluctuations/Variability: Mood changes and blood sugar changes result in more noticeable confusion day to day    Previous screenings/evaluation(s):   2023-09 Neurology in [place]:   -Likely dementia with MMSE of 17/30; no reversible issues but AD vs VaD  -referred to our clinic     CURRENT FUNCTIONING AND CONCERNS     Main Concerns/Goals for today   1: dementia diagnosis and stage  2: treatment and care plan     Cognition   Current Sxs/severity: NA       Daily Functioning   ADLs:    Bathing: Independent  Dressing: Independent  Grooming: Independent   Toileting: Independent  Transferring: Independent  Eating: Independent   IADLs:    Finances: 1-2 years ago started having overdrafts and family member now manages the finances.   Medication Mgmt: Son sets up the pillbox and takes with him checking in daily.  She has trouble with insulin, however.   Driving: Stopped driving completely at family insistence in the past several months  >several incidents in the past of getting lost along with one a few months ago with her being lost for 2-days (family had a tracker on her phone but she didn't answer the phone) until they were able to get her home safely which involved [employer] troopers and others   Household Mgmt: Lives alone but family checks in frequently. They have a camera.   >>Son does want his mother to live with him but she isn't open to this Cooking/Meal Preparation: She still cooks and this was a long time hobby/interest. Friends and family come around to help a lot.   Shopping: They do this now and have for 2-years.    Appointment Mgmt: They do this          1/3/2024     3:51 PM   IADL   Ability to Use Telephone Operates telephone on own initiative, looks up and dials numbers   Shopping Needs to be accompanied on any shopping trip   Food Preparation Plans, prepares, and serves adequate meals independently  "  Housekeeping Maintains house alone with occasional assistance (heavy work)   Laundry Does personal laundry completely   Mode of Transportation Travel limited to taxi or automobile with assistance of another   Responsibility for Own Medications Is responsible for taking medication in correct dosages at correct time   Ability to Handle Finances Incapable of handling money     Psychiatric/Neuropsychiatric Symptoms   Mood Sxs: Generally, "anxious things." She references changes during the day.   Depression: NA  Anxiety: Yes, but she cannot meaningfully describe. Son doesn't notice significant sxs apart from paranoia  Social Withdrawal: NA  Neurovegetative Sxs:  Appetite: Pt: Okay  Sleep: Pt: "I sleep okay and like sitting up to sleep."   Energy: Varies Neuropsychiatric Sxs:  Irritability/Agitation: Quite a bit of agitation when she feels managed and monitored by son and niece.   Apathy/Indifference: reduced activity level and stays home now a lot but more engaged with friends (e.g., not her primary caregivers)  Hallucinations:NA   Delusional/Paranoid Thinking: Yes, quite a bit and resistant to family challenging her.  Impulsivity: NA  Obsessive/Compulsive Behaviors: NA  Other :          1/3/2024     3:37 PM   NPIQ RFS   WHO IS FILLING OUT FORM? Caregiver   Does this patient have false beliefs, such as thinking that others are stealing from him/her or planning to harm him/her in some way? Yes   Delusions Severity 1   Delusion Distress 1   Does this patient have hallucinations such as false visions or voices? Morrison she/he seem to hear or see things that are not present? No   Is the patient resistive to help from others at times, or hard to handle? Yes   Agitation Agression Severity 3   Agitation/Agression Distress 4   Does the patient seem sad or say that he/she is depressed? Yes   Depression/Dysphoria Severity 3   Depression/Dysphoria Distress 5   Does the patient become upset when  from you? Does he/she have " any other signs of nervousness such as shortness of breath, sighing, being unable tor elax, or feeling excessively tense? No   Does the patient appear to feel good or act excessively happy? Yes   Elation/Euphoria Severity 2   Elation/Euphoria Distress 2   Does this patient seem less interested in his/her usual activities or in the activities and plans of others? Yes   Apathy/Indifference Severity 2   Apathy/Indifference Distress 3   Does this patient seem to act cumpolsively, for example, talking to strangers as if she/he knows them, or saying things that may hurt people's feelings? Yes   Dis-inhibition Severity 3   Dis-inhibition Distress 5   Is the patient impatient and cranky? Does he/she have difficulty coping with delays or waiting for planned activities? Yes   Irritability/Liability Severity 3   Irritability/Liability Distress 5   Does the patient engage in repetitive activities such as pacing around the house, handling buttons, wrapping string, or doing other things repeatedly? Yes   Motor Disturbance Severity 3   Motor Disturbance Distress 5   Does this patient awaken you during the night, rise too early in the morning, or take excessive naps during the day? Yes   Nightime Behavior Severity 2   Nightime Behavior Distress 3   Has the patient lost or gained weight, or had a change in the type of food he/she likes? Yes   Apetitie/Eating Severity 3   Apetite/Eating Distress 5   NPI Total Severity Score 25   NPI Total Distress Score 38           Physical Functioning   Review neurology note from today       PERTINENT BACKGROUND INFORMATION     Development/Education  Work    Attainment: HS   Learning/Attention/Behavior Difficulties: NA  Occupational Status: Retired  Primary Occupation:         Family/Social  Day to day   Family Status: 3 boys ([place], [place], [place])   Support System: Several relatives but primarily son and niece  Current Living Situation: Home, alone  Caregiver concerns: Primarily  "around dx, stage, and safety  Hobbies/Activities: Enjoys cooking and time with friends  Stressors:NA  Typical Day: Mostly home and tv now       MEDICAL HISTORY     Patient Active Problem List   Diagnosis    Anxiety and depression    Type 2 diabetes mellitus with diabetic peripheral angiopathy without gangrene, with long-term current use of insulin    Mixed hyperlipidemia    Essential hypertension    Osteopenia of multiple sites    Moderate late onset Alzheimer's dementia with mood disturbance       Past Medical History:   Diagnosis Date    Anxiety and depression 4/8/2023    Essential hypertension 4/8/2023    Mixed hyperlipidemia 4/8/2023    Osteopenia of multiple sites 4/8/2023    Type 2 diabetes mellitus with diabetic peripheral angiopathy without gangrene, with long-term current use of insulin 4/8/2023       Past Surgical History:   Procedure Laterality Date    GI obstruction N/A     HYSTERECTOMY           Pertinent Neurologic History     TBIs NA   Seizures NA   CVAs NA   Movement Dis. NA   Other NA       Family Neurological & Psychiatric History       Family History   Problem Relation Age of Onset    Coronary artery disease Mother 85    Lung disease Father 77        Chronic Obstructive Lung Disease     Neurologic: Negative for heritable risk factors.   Psychiatric: Negative for heritable risk factors.        Pertinent Labs Impacting Cognition   No results found for: "DLFBSIBI01"  No results found for: "RPR"  No results found for: "FOLATE"  Lab Results   Component Value Date    TSH 1.490 08/28/2023     Lab Results   Component Value Date    HGBA1C 7.8 (H) 01/08/2024     No results found for: "HIV1X2", "TZV27OCPY"    Neurodiagnostics     Year Diagnostic Results[Copied from Report]   2023   MRI No acute intracranial pathology or significant abnormality identified.  Global age appropriate brain atrophy.      Medications     Current Outpatient Medications   Medication Instructions    aspirin (ECOTRIN) 81 mg, Oral    " "blood-glucose sensor (FREESTYLE NISHA 3 SENSOR) Suzanne 1 Device, Misc.(Non-Drug; Combo Route), Every 14 days    LANTUS SOLOSTAR U-100 INSULIN 40 Units, Subcutaneous    olmesartan (BENICAR) 40 mg, Oral, Daily    pravastatin (PRAVACHOL) 40 mg, Oral    sertraline (ZOLOFT) 50 mg, Oral, Daily         Pertinent Psychiatric History     Sxs:   Longstanding: NA  Intermittent: NA  Recent: NA    Substance Use:  Current Use: NA  Prior Problems: None  Etoh: None  THC:  None Treatment Hx:  Medication: None  Current: NA  Pertinent prior:NA  Therapy: None  Current: NA  Pertinent prior: NA  Inpatient: None  NA     MENTAL STATUS AND OBSERVATIONS:   APPEARANCE: Casually dressed and adequate grooming/hygiene.   ALERTNESS/ORIENTATION: Attentive and alert. See MoCA  SENSORY CONCERNS: Review Neurology Note from Clinic  GAIT: Review Neurology Note from Clinic  MOTOR MOVEMENTS/MANNERISMS: Review Neurology Note from Clinic  SPEECH/LANGUAGE: Normal in rate, rhythm, tone, and volume. Mild word finding difficulty noted. Expressive and receptive language was normal. Content of speech was often tangential and inaccurate.   STATED MOOD/AFFECT: The patients stated mood was "good" Affect was congruent with stated mood.   INTERPERSONAL BEHAVIOR: Rapport was quickly and easily established   SUICIDALITY/HOMICIDALITY: None reported  HALLUCINATIONS/DELUSIONS: None evidenced or endorsed  THOUGHT PROCESSES: Perseverative and non-linear  TEST TAKING BEHAVIOR and VALIDITY: Observation of effort was suggestive of adequate engagement. The current results, therefore, are likely a valid reflection of the patient's current functioning.       PROCEDURES/TESTS ADMINISTERED:   In addition to performing a review of pertinent medical records, reviewing limits to confidentiality, conducting a clinical interview, and explaining procedures, the following measures were administered: Evgeny Cognitive Assessment (MoCA), MMSE (to compare from previous exams), NPI-2, IADL, " REGINE, Manual norms were used unless otherwise indicated.        DATA TABLE     COGNITIVE SCREENING Raw Score Type of Standardized Score   MoCA 11 -   Orientation - Place 2/2 -   Orientation - Date 1/4 -   MMSE 22 -   Orientation - Place 4/5 -   Orientation - Date 3/5 -   EXECUTIVE FUNCTIONING Raw Score Type of Standardized Score   REGINE 12 zscore       BILLING     Service Description CPT Code Minutes Units   Psychiatric diagnostic evaluation by physician [id number]     Neurobehavioral status exam by physician [id number] [phone]   Test Evaluation Services   Neuropsychological testing evaluation services by physician [phone]   Each additional hour by physician [phone]   Test Administration and Scoring   Psychological or neuropsychological test administration and scoring by technician [id number] [phone]   Each additional 30 minutes by technician [id number]

## 2024-01-03 NOTE — PROGRESS NOTES
Social Work - Dementia Care Management:    Brief meeting in clinic to introduce services to pt's son. Provided my card, will follow up at FB appt; enc son to contact me in meantime if needed.

## 2024-01-06 DIAGNOSIS — E11.51 TYPE 2 DIABETES MELLITUS WITH DIABETIC PERIPHERAL ANGIOPATHY WITHOUT GANGRENE, WITH LONG-TERM CURRENT USE OF INSULIN: ICD-10-CM

## 2024-01-06 DIAGNOSIS — Z79.4 TYPE 2 DIABETES MELLITUS WITH DIABETIC PERIPHERAL ANGIOPATHY WITHOUT GANGRENE, WITH LONG-TERM CURRENT USE OF INSULIN: ICD-10-CM

## 2024-01-08 ENCOUNTER — OFFICE VISIT (OUTPATIENT)
Dept: FAMILY MEDICINE | Facility: CLINIC | Age: 79
End: 2024-01-08
Payer: MEDICARE

## 2024-01-08 ENCOUNTER — LAB VISIT (OUTPATIENT)
Dept: LAB | Facility: HOSPITAL | Age: 79
End: 2024-01-08
Attending: FAMILY MEDICINE
Payer: MEDICARE

## 2024-01-08 VITALS
HEIGHT: 64 IN | OXYGEN SATURATION: 99 % | WEIGHT: 152.63 LBS | DIASTOLIC BLOOD PRESSURE: 80 MMHG | TEMPERATURE: 99 F | SYSTOLIC BLOOD PRESSURE: 140 MMHG | HEART RATE: 68 BPM | BODY MASS INDEX: 26.06 KG/M2

## 2024-01-08 DIAGNOSIS — Z79.4 TYPE 2 DIABETES MELLITUS WITH DIABETIC PERIPHERAL ANGIOPATHY WITHOUT GANGRENE, WITH LONG-TERM CURRENT USE OF INSULIN: ICD-10-CM

## 2024-01-08 DIAGNOSIS — E11.51 TYPE 2 DIABETES MELLITUS WITH DIABETIC PERIPHERAL ANGIOPATHY WITHOUT GANGRENE, WITH LONG-TERM CURRENT USE OF INSULIN: Primary | ICD-10-CM

## 2024-01-08 DIAGNOSIS — E78.2 MIXED HYPERLIPIDEMIA: ICD-10-CM

## 2024-01-08 DIAGNOSIS — Z79.4 TYPE 2 DIABETES MELLITUS WITH DIABETIC PERIPHERAL ANGIOPATHY WITHOUT GANGRENE, WITH LONG-TERM CURRENT USE OF INSULIN: Primary | ICD-10-CM

## 2024-01-08 DIAGNOSIS — F03.B4 MODERATE DEMENTIA WITH ANXIETY, UNSPECIFIED DEMENTIA TYPE: ICD-10-CM

## 2024-01-08 DIAGNOSIS — E11.51 TYPE 2 DIABETES MELLITUS WITH DIABETIC PERIPHERAL ANGIOPATHY WITHOUT GANGRENE, WITH LONG-TERM CURRENT USE OF INSULIN: ICD-10-CM

## 2024-01-08 DIAGNOSIS — F32.A ANXIETY AND DEPRESSION: ICD-10-CM

## 2024-01-08 DIAGNOSIS — F41.9 ANXIETY AND DEPRESSION: ICD-10-CM

## 2024-01-08 DIAGNOSIS — I10 ESSENTIAL HYPERTENSION: ICD-10-CM

## 2024-01-08 LAB
ALBUMIN SERPL-MCNC: 3.7 G/DL (ref 3.4–4.8)
ALBUMIN/GLOB SERPL: 1.1 RATIO (ref 1.1–2)
ALP SERPL-CCNC: 104 UNIT/L (ref 40–150)
ALT SERPL-CCNC: 10 UNIT/L (ref 0–55)
AST SERPL-CCNC: 21 UNIT/L (ref 5–34)
BILIRUB SERPL-MCNC: 0.3 MG/DL
BUN SERPL-MCNC: 17.6 MG/DL (ref 9.8–20.1)
CALCIUM SERPL-MCNC: 9.4 MG/DL (ref 8.4–10.2)
CHLORIDE SERPL-SCNC: 105 MMOL/L (ref 98–107)
CO2 SERPL-SCNC: 30 MMOL/L (ref 23–31)
CREAT SERPL-MCNC: 0.94 MG/DL (ref 0.55–1.02)
EST. AVERAGE GLUCOSE BLD GHB EST-MCNC: 177.2 MG/DL
GFR SERPLBLD CREATININE-BSD FMLA CKD-EPI: >60 MLS/MIN/1.73/M2
GLOBULIN SER-MCNC: 3.5 GM/DL (ref 2.4–3.5)
GLUCOSE SERPL-MCNC: 91 MG/DL (ref 82–115)
HBA1C MFR BLD: 7.8 %
POTASSIUM SERPL-SCNC: 4.2 MMOL/L (ref 3.5–5.1)
PROT SERPL-MCNC: 7.2 GM/DL (ref 5.8–7.6)
SODIUM SERPL-SCNC: 141 MMOL/L (ref 136–145)

## 2024-01-08 PROCEDURE — 3079F DIAST BP 80-89 MM HG: CPT | Mod: CPTII,,, | Performed by: FAMILY MEDICINE

## 2024-01-08 PROCEDURE — 3288F FALL RISK ASSESSMENT DOCD: CPT | Mod: CPTII,,, | Performed by: FAMILY MEDICINE

## 2024-01-08 PROCEDURE — 1101F PT FALLS ASSESS-DOCD LE1/YR: CPT | Mod: CPTII,,, | Performed by: FAMILY MEDICINE

## 2024-01-08 PROCEDURE — 1160F RVW MEDS BY RX/DR IN RCRD: CPT | Mod: CPTII,,, | Performed by: FAMILY MEDICINE

## 2024-01-08 PROCEDURE — 80053 COMPREHEN METABOLIC PANEL: CPT

## 2024-01-08 PROCEDURE — 36415 COLL VENOUS BLD VENIPUNCTURE: CPT

## 2024-01-08 PROCEDURE — 1159F MED LIST DOCD IN RCRD: CPT | Mod: CPTII,,, | Performed by: FAMILY MEDICINE

## 2024-01-08 PROCEDURE — 99214 OFFICE O/P EST MOD 30 MIN: CPT | Mod: ,,, | Performed by: FAMILY MEDICINE

## 2024-01-08 PROCEDURE — 83036 HEMOGLOBIN GLYCOSYLATED A1C: CPT

## 2024-01-08 PROCEDURE — 3077F SYST BP >= 140 MM HG: CPT | Mod: CPTII,,, | Performed by: FAMILY MEDICINE

## 2024-01-08 RX ORDER — INSULIN GLARGINE 100 [IU]/ML
40 INJECTION, SOLUTION SUBCUTANEOUS
Qty: 12 ML | Refills: 0 | Status: SHIPPED | OUTPATIENT
Start: 2024-01-08 | End: 2024-02-19 | Stop reason: SDUPTHER

## 2024-01-08 RX ORDER — PRAVASTATIN SODIUM 40 MG/1
40 TABLET ORAL
COMMUNITY
Start: 2024-01-05

## 2024-01-08 RX ORDER — SERTRALINE HYDROCHLORIDE 50 MG/1
50 TABLET, FILM COATED ORAL DAILY
Qty: 90 TABLET | Refills: 1 | Status: SHIPPED | OUTPATIENT
Start: 2024-01-08 | End: 2025-01-07

## 2024-01-08 RX ORDER — ASPIRIN 81 MG/1
81 TABLET ORAL
COMMUNITY
Start: 2024-01-05

## 2024-01-08 RX ORDER — BLOOD-GLUCOSE SENSOR
1 EACH MISCELLANEOUS
Qty: 3 EACH | Refills: 3 | Status: SHIPPED | OUTPATIENT
Start: 2024-01-08 | End: 2024-02-19 | Stop reason: SDUPTHER

## 2024-01-08 NOTE — PROGRESS NOTES
Patient ID: [id number]     Chief Complaint: Diabetes      HPI:     Tara Parker is a 78 y.o. female here today with her son for acute visit  Patient was recently evaluated by cardiologist-was restarted on her pravastatin which she had stopped taking.  Patient has been evaluated by neurologist in your-recommendations for home health-family is assessing treatment options for dementia.  1) Type 2 DM: Patient has been taking 15 units of insulin a day-blood sugars has been lower.  Patient does experience low blood sugar if she does not eat a meal before bedtime.  2) Hypertension: Patient has been taking medicine daily. BP is usually in the range of below 140/90. No symptoms associated with increased BP such as headache/ visual changes/ dizziness/ chest pain.   3) Hyperlipidemia: Patient is taking medication at Night. No side effects of medication noted.    Past Medical History:   Diagnosis Date    Anxiety and depression 4/8/2023    Essential hypertension 4/8/2023    Mixed hyperlipidemia 4/8/2023    Osteopenia of multiple sites 4/8/2023    Type 2 diabetes mellitus with diabetic peripheral angiopathy without gangrene, with long-term current use of insulin 4/8/2023        Past Surgical History:   Procedure Laterality Date    GI obstruction N/A     HYSTERECTOMY          Social History     Tobacco Use    Smoking status: Never    Smokeless tobacco: Never   Substance Use Topics    Alcohol use: Not Currently    Drug use: Never        Social History     Socioeconomic History    Marital status:     Number of children: 3        Current Outpatient Medications   Medication Instructions    aspirin (ECOTRIN) 81 mg, Oral    blood-glucose sensor (FREESTYLE NISHA 3 SENSOR) Suzanne 1 Device, Misc.(Non-Drug; Combo Route), Every 14 days    flash glucose sensor (FREESTYLE NISHA 2 SENSOR) Kit 1 kit, Misc.(Non-Drug; Combo Route), Daily    LANTUS SOLOSTAR U-100 INSULIN 40 Units, Subcutaneous    olmesartan (BENICAR) 40 mg, Oral, Daily     "pravastatin (PRAVACHOL) 40 mg, Oral    sertraline (ZOLOFT) 25 mg, Oral, Daily       Review of patient's allergies indicates:  No Known Allergies     Patient Care Team:  Lilliana De La Torre MD as PCP - General (Family Medicine)  Juan David Zapata MD as Consulting Physician (Neurology)  Roscoe Boyce MD as Consulting Physician (Cardiology)       Subjective:     Review of Systems    12 point review of systems conducted, negative except as stated in the history of present illness. See HPI for details.      Objective:     Visit Vitals  BP (!) 140/80 (BP Location: Left arm, Patient Position: Sitting)   Pulse 68   Temp 98.8 °F (37.1 °C)   Ht 5' 4" (1.626 m)   Wt 69.2 kg (152 lb 9.6 oz)   SpO2 99%   BMI 26.19 kg/m²       Physical Exam    General: Alert and oriented, No acute distress.  Head: Normocephalic, Atraumatic.  Eye: Pupils are equal, round and reactive to light, Extraocular movements are intact, Sclera non-icteric.  Ears/Nose/Throat: Normal, Mucosa moist,Clear.  Neck/Thyroid: Supple, Non-tender  Respiratory: Clear to auscultation bilaterally  Cardiovascular: Regular rate and rhythm  Gastrointestinal: Soft, Non-tender, Non-distended, Normal bowel sounds, No palpable organomegaly.  Musculoskeletal: Normal range of motion.  Integumentary: Warm, Dry, Intact  Extremities: No clubbing, cyanosis or edema  Neurologic: No focal deficits, Cranial Nerves II-XII are grossly intact  Psychiatric: Normal interaction, Coherent speech, Euthymic mood, Appropriate affect       Assessment:       ICD-10-CM ICD-9-CM   1. Type 2 diabetes mellitus with diabetic peripheral angiopathy without gangrene, with long-term current use of insulin  E11.51 250.70    Z79.4 443.81     V58.67   2. Moderate dementia with anxiety, unspecified dementia type  F03.B4 294.21   3. Anxiety and depression  F41.9 300.00    F32.A 311   4. Essential hypertension  I10 401.9   5. Mixed hyperlipidemia  E78.2 272.2        Plan:   1. Type 2 diabetes mellitus with diabetic " peripheral angiopathy without gangrene, with long-term current use of insulin  Lab Results   Component Value Date    HGBA1C 9.7 (H) 08/28/2023    HGBA1C 10.0 04/04/2023    CREATININE 0.99 08/28/2023      Pathophysiology/treatment/dangers discussed.   Patient to continue insulin.  Blood sugar goal of less than 120 fasting and 140-150 about 2 hours after meal.   Current HA1C was 9.7. Hemoglobin A1c needs to be rechecked.  Goal less than 6.5.  Follow ADA Diet. Avoid soda, simple sweets, and limit rice/pasta/breads/starches (no more than 45-50 grams per meal).  Maintain healthy weight with goal BMI <30.  Exercise 5 times per week for 30 minutes per day.  Stressed importance of daily foot exams.Stressed importance of annual dilated eye exam.   - Comprehensive Metabolic Panel; Future  - Hemoglobin A1C; Future  - blood-glucose sensor (FREESTYLE NISHA 3 SENSOR) Suzanne; 1 Device by Misc.(Non-Drug; Combo Route) route every 14 (fourteen) days.  Dispense: 3 each; Refill: 3  - Ambulatory referral/consult to Home Health; Future  - Hemoglobin A1C; Future  - Comprehensive Metabolic Panel; Future  - Lipid Panel; Future  - Microalbumin/Creatinine Ratio, Urine; Future    2. Moderate dementia with anxiety, unspecified dementia type  Patient is comanage with  Neurologist-no acute modifications or recommendations at this time.    3. Anxiety and depression  Depression/Anxiety: Patient is doing well on medication for depression/anxiety- continue Zoloft 50 mg.  - sertraline (ZOLOFT) 50 MG tablet; Take 1 tablet (50 mg total) by mouth once daily.  Dispense: 90 tablet; Refill: 1    4. Essential hypertension  Patient has been taking medication. Blood pressure goal of less than 130/80-blood pressure is stable. Continue to encourage diet and activity modifications. Including stress management. Pathophysiology/treatment/dangers discussed.    5. Mixed hyperlipidemia  Lab Results   Component Value Date    CHOL 260 (H) 08/28/2023    TRIG 129 08/28/2023     HDL 54 08/28/2023     Pathophysiology/treatment/dangers discussed. Patient to continue diet modification/pravastatin.   Total cholesterol 260 ( Goal less than 200) HDL 54 ( Goal high as possible)  ( Goal less than 100) Triglycerides 129 ( Goal less than 150)     Follow up in about 3 months (around 4/8/2024). In addition to their scheduled follow up, the patient has also been instructed to follow up on as needed basis.     Future Appointments   Date Time Provider Department Center   1/24/2024  2:30 PM ASSESSMENT CLINIC [facility] NERPSY8 [facility]   3/27/2024  1:30 PM Eddy Garcia, AGACNP[facility] [address] [facility]   4/8/2024 12:00 PM Lilliana De La Torre MD [facility] [place]

## 2024-01-09 ENCOUNTER — PATIENT OUTREACH (OUTPATIENT)
Dept: ADMINISTRATIVE | Facility: HOSPITAL | Age: 79
End: 2024-01-09
Payer: MEDICARE

## 2024-01-09 NOTE — LETTER
"  This communication is flagged as high priority.        AUTHORIZATION FOR RELEASE OF   CONFIDENTIAL INFORMATION    Dear Staff,    We are seeing Tara Parker, date of birth 1945, in the clinic at [facility]. Lilliana De La Torre MD is the patient's PCP. Tara Parker has an outstanding lab/procedure at the time we reviewed her chart. In order to help keep her health information updated, she has authorized us to request the following medical record(s):        (  )  MAMMOGRAM                                      (  )  COLONOSCOPY      (  )  PAP SMEAR                                          (  )  OUTSIDE LAB RESULTS     (  )  DEXA SCAN                                          (xx  )  DM EYE EXAM            (  )  FOOT EXAM                                          (  )  ENTIRE RECORD     (  )  OUTSIDE IMMUNIZATIONS                 (  )  _______________         Please fax records to [facility], Gautam, Indira, MD,  [phone]  Attn: Frannie       If you have any questions, please contact Oskar Gallo" [facility] Coordinator @ [phone]    Patient Name: Tara Parker  : 1945  Patient Phone #: [phone]     "

## 2024-01-09 NOTE — PROGRESS NOTES
Population Health Outreach.  Requested DM Eye screening from Dr Whitley in [place],   fax number [phone]

## 2024-01-09 NOTE — PROGRESS NOTES
Population Health Outreach.  Office called no patient by the name nothing pending at present for the name.

## 2024-01-16 PROBLEM — F02.B3 MODERATE LATE ONSET ALZHEIMER'S DEMENTIA WITH MOOD DISTURBANCE: Status: ACTIVE | Noted: 2023-09-20

## 2024-01-16 PROBLEM — G30.1 MODERATE LATE ONSET ALZHEIMER'S DEMENTIA WITH MOOD DISTURBANCE: Status: ACTIVE | Noted: 2023-09-20

## 2024-01-16 NOTE — ASSESSMENT & PLAN NOTE
Assessment:  -Cognitive Testing:   Brief Screen MoCA=11/30 (disoriented to time)  REGINE=12   More Detailed Measures NA     -Neuro/Medical Exam: See same day Neuro note    -Etiology/Stage/Present Concerns:  Etiology Very likely mixed dementia (Alzheimer's and vascular dz)   Stage/Severity  Moderate stage now   Behavioral Issues Agitation, paranoia, and resistance to help are primary issues right now.   Level of Support Recommendations Overall: Family is managing but they are overwhelmed. Will discuss with son and family about living recommendations, management recs, and so forth.  Health/Medications: Family must handle completely  Driving: She is not driving anymore  Home: Ideally, living with a relative or someone living with her.   Finances: Family is handling   Caregiver concerns Primarily, education around stage and supervision needs.         Plan:  Memory Clinic Follow-up Plan 12-months for annual assessment and PRN vs continuing in [place]. Will discuss what family wants.   Neuropsych:  Will review results, will provide education/support around diagnosis and needs, and will discuss plan of care in a separately scheduled follow-up session   Neurology See Note   Care Management Social Work: Jessica to check in with son   Family/Caregiver plan Behavioral Symptoms: Will discuss various caregiver-implemented behavioral strategies to address paranoia, agitation, and resistance to help  Routine/Structure: Will discuss need to implement routine / structure with daily and weekly schedule that is consistent.  Supervision Needs: Will discuss supervision recommendations with family.    Brain Health Strategies for family to organize and prompt/monitor Exercise/Physical Activity: Unless medically not able, daily physical activity is very helpful for physical and mental health for patients with cognitive trouble.   Diet: Talk with your physician or nutritionist about whats right for you, but a Mediterranean diet has been found  to be most effective at promoting brain health  Activity: Explore various activities beyond television for mental stimulation. This can include: music, art, simple games, and so forth.   Socialization: Opportunities to see family and friends stimulates mood and our mind. Sometimes patients need fewer people or certain people to improve engagement.

## 2024-01-24 ENCOUNTER — OFFICE VISIT (OUTPATIENT)
Dept: NEUROLOGY | Facility: CLINIC | Age: 79
End: 2024-01-24
Payer: MEDICARE

## 2024-01-24 ENCOUNTER — OUTPATIENT CASE MANAGEMENT (OUTPATIENT)
Dept: NEUROLOGY | Facility: CLINIC | Age: 79
End: 2024-01-24
Payer: MEDICARE

## 2024-01-24 VITALS
DIASTOLIC BLOOD PRESSURE: 79 MMHG | BODY MASS INDEX: 26.7 KG/M2 | SYSTOLIC BLOOD PRESSURE: 197 MMHG | HEART RATE: 75 BPM | WEIGHT: 155.56 LBS

## 2024-01-24 DIAGNOSIS — F41.9 ANXIETY AND DEPRESSION: ICD-10-CM

## 2024-01-24 DIAGNOSIS — G30.1 MODERATE LATE ONSET ALZHEIMER'S DEMENTIA WITH MOOD DISTURBANCE: Primary | ICD-10-CM

## 2024-01-24 DIAGNOSIS — Z79.4 TYPE 2 DIABETES MELLITUS WITH DIABETIC PERIPHERAL ANGIOPATHY WITHOUT GANGRENE, WITH LONG-TERM CURRENT USE OF INSULIN: ICD-10-CM

## 2024-01-24 DIAGNOSIS — F02.B3 MODERATE LATE ONSET ALZHEIMER'S DEMENTIA WITH MOOD DISTURBANCE: Primary | ICD-10-CM

## 2024-01-24 DIAGNOSIS — F32.A ANXIETY AND DEPRESSION: ICD-10-CM

## 2024-01-24 DIAGNOSIS — E78.2 MIXED HYPERLIPIDEMIA: ICD-10-CM

## 2024-01-24 DIAGNOSIS — E11.51 TYPE 2 DIABETES MELLITUS WITH DIABETIC PERIPHERAL ANGIOPATHY WITHOUT GANGRENE, WITH LONG-TERM CURRENT USE OF INSULIN: ICD-10-CM

## 2024-01-24 PROCEDURE — 99499 UNLISTED E&M SERVICE: CPT | Mod: S$GLB,,, | Performed by: PSYCHIATRY & NEUROLOGY

## 2024-01-24 PROCEDURE — 99999 PR PBB SHADOW E&M-EST. PATIENT-LVL III: CPT | Mod: PBBFAC,,,

## 2024-01-24 PROCEDURE — 99215 OFFICE O/P EST HI 40 MIN: CPT | Mod: S$GLB,,, | Performed by: NURSE PRACTITIONER

## 2024-01-24 NOTE — PROGRESS NOTES
Name: Tara Parker  MRN: [id number]   CSN: [id number]      Date: 1-24-24      Referring physician:  No referring provider defined for this encounter.    Subjective:      Chief Complaint: results of Neuropsychological evaluation     History of Present Illness (HPI):    Tara Parker is a 78 y.o. female with DM2, HTN, HLD, anxiety/depression who presents today for a follow-up evaluation of Dementia (new to me) and is accompanied by son.  Followed by Dr. Garcia, neurologist.  Multiple family members joined by phone.       Son has been filling pill box. He has found her making a couple of errors.   They are looking at getting some help in home, suzy with meds.     Notes that when BS or BP gets up, she is more irritable.               Dr. Garcia visit 9-20-23:  HPI:            Son states pt had a decline in her memory for abt 3 yrs.   Son states pts boyfriend passed away and her memory continued to decline. Pt got lost in [place] trying to get back to [place] after taking a detour.   Pt spent the night in her car at a truck stop in [place].      Son put out a missing alert and a  told the pt she had a missing person alert and thept was on the phone and the person on the phone spoke w the  and told her where she was.     Appetite:   Mood: depressed   Sleep: sleeps well   Urinary:  Bowel:   Lives alone      MRIb w/o 05/22/2023 -  Global age appropriate brain atrophy.     Hep c nonreactive  TSH nml  A1C 9.7  Chol  260    CBC/CMP reviewed - glucose elevated; otherwise ok     Review of Systems    Past Medical History: The patient  has a past medical history of Anxiety and depression (4/8/2023), Essential hypertension (4/8/2023), Mixed hyperlipidemia (4/8/2023), Osteopenia of multiple sites (4/8/2023), and Type 2 diabetes mellitus with diabetic peripheral angiopathy without gangrene, with long-term current use of insulin (4/8/2023).    Social History: The patient  reports that she has never smoked. She has  never used smokeless tobacco. She reports that she does not currently use alcohol. She reports that she does not use drugs.    Family History: Their family history includes Coronary artery disease (age of onset: 85) in her mother; Lung disease (age of onset: 77) in her father.    Allergies: Patient has no known allergies.     Meds:   Current Outpatient Medications on File Prior to Visit   Medication Sig Dispense Refill    aspirin (ECOTRIN) 81 MG EC tablet Take 81 mg by mouth.      blood-glucose sensor (FREESTYLE NISHA 3 SENSOR) Suzanne 1 Device by Misc.(Non-Drug; Combo Route) route every 14 (fourteen) days. 3 each 3    olmesartan (BENICAR) 40 MG tablet Take 1 tablet (40 mg total) by mouth once daily. 90 tablet 3    pravastatin (PRAVACHOL) 40 MG tablet Take 40 mg by mouth.      sertraline (ZOLOFT) 50 MG tablet Take 1 tablet (50 mg total) by mouth once daily. 90 tablet 1    LANTUS SOLOSTAR U-100 INSULIN glargine 100 units/mL SubQ pen INJECT 40 UNITS INTO THE SKIN ONCE DAILY (Patient taking differently: Inject 15 Units into the skin.) 12 mL 0     No current facility-administered medications on file prior to visit.       Objective:     Physical Exam:    Vitals:    01/24/24 1426 01/24/24 1429   BP: (!) 197/84 (!) 197/79   BP Location: Right arm Right forearm   Patient Position: Sitting Sitting   BP Method: Medium (Automatic) Medium (Automatic)   Pulse: 74 75   Weight: 70.5 kg (155 lb 8.6 oz)      Body mass index is 26.7 kg/m².    Constitutional  Well-developed, well-nourished, appears stated age  Appropriately dressed, groomed     Generally sits quietly.   When does participate, she is pleasant, smiles and  tangential.  RR equal and unlabored. NAD.    Face symmetric.   EOMI.  Hearing intact to conversation.   Gait normal and steady.       Laboratory Results:  Lab Visit on 01/08/2024   Component Date Value Ref Range Status    Sodium Level 01/08/2024 141  136 - 145 mmol/L Final    Potassium Level 01/08/2024 4.2  3.5 - 5.1  mmol/L Final    Chloride 01/08/2024 105  98 - 107 mmol/L Final    Carbon Dioxide 01/08/2024 30  23 - 31 mmol/L Final    Glucose Level 01/08/2024 91  82 - 115 mg/dL Final    Blood Urea Nitrogen 01/08/2024 17.6  9.8 - 20.1 mg/dL Final    Creatinine 01/08/2024 0.94  0.55 - 1.02 mg/dL Final    Calcium Level Total 01/08/2024 9.4  8.4 - 10.2 mg/dL Final    Protein Total 01/08/2024 7.2  5.8 - 7.6 gm/dL Final    Albumin Level 01/08/2024 3.7  3.4 - 4.8 g/dL Final    Globulin 01/08/2024 3.5  2.4 - 3.5 gm/dL Final    Albumin/Globulin Ratio 01/08/2024 1.1  1.1 - 2.0 ratio Final    Bilirubin Total 01/08/2024 0.3  <=1.5 mg/dL Final    Alkaline Phosphatase 01/08/2024 104  40 - 150 unit/L Final    Alanine Aminotransferase 01/08/2024 10  0 - 55 unit/L Final    Aspartate Aminotransferase 01/08/2024 21  5 - 34 unit/L Final    eGFR 01/08/2024 >60  mls/min/1.73/m2 Final    Hemoglobin A1c 01/08/2024 7.8 (H)  <=7.0 % Final    Estimated Average Glucose 01/08/2024 177.2  mg/dL Final           Imaging:           Results for orders placed or performed in visit on 05/22/23   MRI Brain Without Contrast    Narrative    EXAMINATION:  MRI BRAIN WITHOUT CONTRAST    CLINICAL HISTORY:  77-year-old woman with short-term memory loss.    TECHNIQUE:  Diffusion, axial and coronal T1, axial FLAIR, axial and coronal T2, and axial T2 GRE non-contrast 1.5 T magnetic resonance imaging was performed through the head/brain.    COMPARISON:  None.    FINDINGS:  Diffusion-weighted imaging through the brain is normal with no area of restricted diffusion or abnormal T2 shine through.  There is mild to moderate global age appropriate cerebral atrophy without focal atrophy.  The MTA score is 2 on the right and 1 on the left.  There is no chronic infarct and no intracranial mass, mass effect, or midline shift.  The ventricles are normal in size and configuration without hydrocephalus.  No brainstem or posterior fossa pathology is identified.  There is no intra-axial or  extra-axial intracranial hemorrhage, abnormal blood products, or abnormal intracranial fluid collection.  Normal intracranial vascular flow voids are demonstrated on T2 weighted imaging.  There is no osseous or extracranial soft tissue abnormality.  The paranasal sinuses are well developed.  There is minimal mucosal thickening at the bilateral anterior ethmoid sinuses and a single small and thin mucous retention cyst at the posterior right maxillary sinus.  The mastoid air cell regions are normal.      Impression    No acute intracranial pathology or significant abnormality identified.  Global age appropriate brain atrophy.      Electronically signed by: Caleb Yun  Date:    05/23/2023  Time:    10:51           Assessment and Plan     Moderate late onset Alzheimer's dementia with mood disturbance    Type 2 diabetes mellitus with diabetic peripheral angiopathy without gangrene, with long-term current use of insulin    Mixed hyperlipidemia    Anxiety and depression        Medical Decision Making:    Dr. Kurtz rev'd results of Neuropsychological evaluation that she had 1-3-24. Likely mixed dementia )Alzheimer's dementia and vascular)  Discussed term dementia.   Discussed Alzheimer's dementia and vascular dementia.   Discussed vas RF and importance of RF control.     She is on sertraline 50 mg daily. If mood issues increase, could increase dose.     Based on cognitive testing, it is recommend that family manage/oversee medications, including assuring she is taking correctly. It is also recommended that she live with family member, not alone.   She should no longer drive.     She is not a candidate for lecanamab infusion since she is in moderate stage of dementia. Additionally, her vascular RF would make her ineligible. Certainly, acetylcholinesterase inhibitors could be option if there is not a cardiac or GI reason to avoid. I am not able to see EKG results. Son says she has had cardiac work-up. Another option would  be to add memantine, as long as her kidneys are ok.       Follow up one year        ..Total time: 60 minutes spent on the encounter, which includes face to face time and non-face to face time preparing to see the patient (eg, review of tests), Obtaining and/or reviewing separately obtained history, Documenting clinical information in the electronic or other health record, Independently interpreting results (not separately reported) and communicating results to the patient/family/caregiver, or Care coordination (not separately reported).         Lillie Romero, THERESA, NP-C  Division of Movement and Memory Disorders  [facility]  [phone]

## 2024-01-29 ENCOUNTER — OUTPATIENT CASE MANAGEMENT (OUTPATIENT)
Dept: NEUROLOGY | Facility: CLINIC | Age: 79
End: 2024-01-29
Payer: MEDICARE

## 2024-01-29 NOTE — PROGRESS NOTES
Social Work - Dementia Care Management:    Attempted to call family members for scheduled phone consultation at 3:00 PM. Called sister Crystal, voice mail was full; called son Rush, no answer, left voice mail.

## 2024-01-29 NOTE — PROGRESS NOTES
"Social Work - Dementia Care Management:    Clinic visit with patient and son Shaka "Phong"; additional relatives joined on speakerphone. Briefly discussed questions/concerns about pt's needs and additional care options. Pt lives alone and per her input, she highly values her independence. She was not in agreement with [facility]'s attempts to provide additional care/supervision. I discussed with pt the value of making small adjustments now that would enable her to maximize her independence. It was agreed I would speak to family further via conference call next week. Scheduled for 1/29/24, 3:00 PM, to speak with pt's sister Crystal, son Rush, niece Kaylie.  "

## 2024-02-05 NOTE — ASSESSMENT & PLAN NOTE
-Feedback session completed to discuss results and plan. Review Neuropsychology Consult dated for 1/3/2024 for complete details of feedback discussion, diagnosis, treatment plan.  -Additional concerns: None  -Follow-up: 12-months or sooner; also with [facility] monthly

## 2024-02-05 NOTE — PROGRESS NOTES
MEMORY CLINIC  Neuropsychology Feedback Visit    Referral Information  Name: Tara Parker  MRN: [id number]  : 1945  Age: 78 y.o.  Billing: Charges for Neuropsychology Feedback billed on 1/3/2024    The chief complaint leading to consultation/medical necessity is: Feedback session for results/treatment plan discussion  Visit type: In Clinic    Total time spent with patient: 70-min with son and patient (other relatives joined via phone)    Problem List Items Addressed This Visit          Neuro    Moderate late onset Alzheimer's dementia with mood disturbance - Primary    Overview     - Memory Clinic: MoCA=         Current Assessment & Plan     -Feedback session completed to discuss results and plan. Review Neuropsychology Consult dated for 1/3/2024 for complete details of feedback discussion, diagnosis, treatment plan.  -Additional concerns: None  -Follow-up: 12-months or sooner; also with [facility] monthly                Psychiatric    Anxiety and depression       Cardiac/Vascular    Mixed hyperlipidemia       Endocrine    Type 2 diabetes mellitus with diabetic peripheral angiopathy without gangrene, with long-term current use of insulin

## 2024-02-19 ENCOUNTER — TELEPHONE (OUTPATIENT)
Dept: FAMILY MEDICINE | Facility: CLINIC | Age: 79
End: 2024-02-19
Payer: MEDICARE

## 2024-02-19 DIAGNOSIS — Z79.4 TYPE 2 DIABETES MELLITUS WITH DIABETIC PERIPHERAL ANGIOPATHY WITHOUT GANGRENE, WITH LONG-TERM CURRENT USE OF INSULIN: ICD-10-CM

## 2024-02-19 DIAGNOSIS — E11.51 TYPE 2 DIABETES MELLITUS WITH DIABETIC PERIPHERAL ANGIOPATHY WITHOUT GANGRENE, WITH LONG-TERM CURRENT USE OF INSULIN: ICD-10-CM

## 2024-02-19 RX ORDER — BLOOD-GLUCOSE SENSOR
1 EACH MISCELLANEOUS
Qty: 3 EACH | Refills: 3 | Status: SHIPPED | OUTPATIENT
Start: 2024-02-19 | End: 2025-02-18

## 2024-02-19 RX ORDER — INSULIN GLARGINE 100 [IU]/ML
40 INJECTION, SOLUTION SUBCUTANEOUS DAILY
Qty: 12 ML | Refills: 0 | Status: SHIPPED | OUTPATIENT
Start: 2024-02-19

## 2024-03-25 DIAGNOSIS — I10 ESSENTIAL HYPERTENSION: ICD-10-CM

## 2024-03-25 RX ORDER — ASPIRIN 81 MG/1
81 TABLET ORAL DAILY
Qty: 90 TABLET | Refills: 3 | Status: SHIPPED | OUTPATIENT
Start: 2024-03-25 | End: 2024-05-20 | Stop reason: SDUPTHER

## 2024-03-25 RX ORDER — OLMESARTAN MEDOXOMIL 40 MG/1
40 TABLET ORAL DAILY
Qty: 90 TABLET | Refills: 1 | Status: SHIPPED | OUTPATIENT
Start: 2024-03-25 | End: 2025-03-25

## 2024-03-25 NOTE — TELEPHONE ENCOUNTER
Patient requesting a refill on her Olmesartan 40 mg and Aspirin 81 mg to be sent to [facility] in [place]

## 2024-04-03 NOTE — PROGRESS NOTES
Patient ID: [id number]     Chief Complaint:  Hypertension    HPI:     Tara Parker is a 78 y.o. female here today for follow up with her son/niece is on the phone:   1) Type 2 DM: Patient has been taking 40 units Lantus once a day-blood sugars are averaging above 140-patient has had a few low blood sugars running into the 70s-usually related to not eating.   2) Hypertension: Patient has been taking medicine daily. BP is running higher-above 140/90 consistently. No symptoms associated with increased BP such as headache/ visual changes/ dizziness/ chest pain.   3) Hyperlipidemia: Patient is taking medication at Night.  No side effects of medication noted.  4)  Depression/Anxiety: Patient has been doing well on medication. No noticeable side effects of the medication.  5) Dementia: Was evaluated by specialist-treatment options discussed-specialists does not feel that patient would benefit from medication at this point    Past Medical History:   Diagnosis Date    Anxiety and depression 4/8/2023    Essential hypertension 4/8/2023    Mixed hyperlipidemia 4/8/2023    Osteopenia of multiple sites 4/8/2023    Type 2 diabetes mellitus with diabetic peripheral angiopathy without gangrene, with long-term current use of insulin 4/8/2023        Past Surgical History:   Procedure Laterality Date    GI obstruction N/A     HYSTERECTOMY          Social History     Tobacco Use    Smoking status: Never    Smokeless tobacco: Never   Substance Use Topics    Alcohol use: Not Currently    Drug use: Never        Social History     Socioeconomic History    Marital status:     Number of children: 3        Current Outpatient Medications   Medication Instructions    amLODIPine (NORVASC) 5 mg, Oral, Daily    aspirin (ECOTRIN) 81 mg, Oral, Daily    blood-glucose sensor (FREESTYLE NISHA 3 SENSOR) Suzanne 1 Device, Misc.(Non-Drug; Combo Route), Every 14 days    LANTUS SOLOSTAR U-100 INSULIN 40 Units, Subcutaneous, Daily    olmesartan  "(BENICAR) 40 mg, Oral, Daily    pravastatin (PRAVACHOL) 40 mg, Oral    sertraline (ZOLOFT) 50 mg, Oral, Daily       Review of patient's allergies indicates:  No Known Allergies     Patient Care Team:  Lilliana De La Torre MD as PCP - General (Family Medicine)  Juan David Zapata MD as Consulting Physician (Neurology)  Roscoe Boyce MD as Consulting Physician (Cardiology)       Subjective:     Review of Systems    12 point review of systems conducted, negative except as stated in the history of present illness. See HPI for details.      Objective:     Visit Vitals  BP (!) 165/78 (BP Location: Right arm, Patient Position: Sitting)   Pulse 76   Temp 97.8 °F (36.6 °C) (Oral)   Ht 5' 4" (1.626 m)   Wt 73.5 kg (162 lb)   SpO2 98%   BMI 27.81 kg/m²       Physical Exam    General: Alert and oriented, No acute distress.  Head: Normocephalic, Atraumatic.  Eye: Pupils are equal, round and reactive to light, Extraocular movements are intact, Sclera non-icteric.  Ears/Nose/Throat: Normal, Mucosa moist,Clear.  Neck/Thyroid: Supple, Non-tender  Respiratory: Clear to auscultation bilaterally  Cardiovascular: Regular rate and rhythm, S1/S2 normal  Gastrointestinal: Soft, Non-tender, Non-distended, Normal bowel sounds, No palpable organomegaly.  Musculoskeletal: Normal range of motion.  Integumentary: Warm, Dry  Extremities: No clubbing, cyanosis or edema  Neurologic: No focal deficits, Cranial Nerves II-XII are grossly intact  Psychiatric: Normal interaction, Coherent speech       Assessment:       ICD-10-CM ICD-9-CM   1. Type 2 diabetes mellitus with diabetic peripheral angiopathy without gangrene, with long-term current use of insulin  E11.51 250.70    Z79.4 443.81     V58.67   2. Mixed hyperlipidemia  E78.2 272.2   3. Essential hypertension  I10 401.9   4. Moderate late onset Alzheimer's dementia with mood disturbance  G30.1 331.0    F02.B3 294.11        Plan:     1. Type 2 diabetes mellitus with diabetic peripheral angiopathy without " gangrene, with long-term current use of insulin  Lab Results   Component Value Date    HGBA1C 7.8 (H) 01/08/2024    HGBA1C 9.7 (H) 08/28/2023    HGBA1C 10.0 04/04/2023    CREATININE 0.94 01/08/2024    CREATININE 0.99 08/28/2023      Pathophysiology/treatment/dangers discussed.   Patient to continue medication.  Blood sugar goal of less than 120 fasting and 140-150 about 2 hours after meal.   Current HA1C is 7.8 . Hemoglobin A1c needs to be rechecked in 1 months. Goal less than 6.5.  Follow ADA Diet. Avoid soda, simple sweets, and limit rice/pasta/breads/starches (no more than 45-50 grams per meal).  Maintain healthy weight with goal BMI <30.  Exercise 5 times per week for 30 minutes per day.  Stressed importance of daily foot exams.Stressed importance of annual dilated eye exam.   - Ambulatory referral/consult to Diabetes Education; Future  - Comprehensive Metabolic Panel; Future  - Hemoglobin A1C; Future  - Lipid Panel; Future    2. Mixed hyperlipidemia  Patient to continue medication-check labs management based on finding.    3. Essential hypertension  Patient has been taking medication. Blood pressure goal of less than 130/80-blood pressure is elevated-patient to start Norvasc 5 mg-Rx clonidine directions for use discussed with the patient and her son-referral to cardiologist for cardiac workup-acute change in blood pressure concerning. Continue to encourage diet and activity modifications. Including stress management. Pathophysiology/treatment/dangers discussed.    - amLODIPine (NORVASC) 5 MG tablet; Take 1 tablet (5 mg total) by mouth once daily.  Dispense: 90 tablet; Refill: 3  - cloNIDine (CATAPRES) 0.1 MG tablet; Take 1 tablet (0.1 mg total) by mouth 2 (two) times daily.  Dispense: 60 tablet; Refill: 1    4. Moderate late onset Alzheimer's dementia with mood disturbance  Patient is comanage with Neurologist-no acute modifications or recommendations at this time.     Follow up in about 4 weeks (around  5/6/2024) for HTN. In addition to their scheduled follow up, the patient has also been instructed to follow up on as needed basis.     Future Appointments   Date Time Provider Department Center   4/9/2024  1:00 PM Eddy Garcia [facility]   5/8/2024  1:00 PM Lilliana De La Torre MD [facility]        Lilliana De La Torre MD

## 2024-04-08 ENCOUNTER — OFFICE VISIT (OUTPATIENT)
Dept: FAMILY MEDICINE | Facility: CLINIC | Age: 79
End: 2024-04-08
Payer: MEDICARE

## 2024-04-08 VITALS
HEART RATE: 76 BPM | OXYGEN SATURATION: 98 % | BODY MASS INDEX: 27.66 KG/M2 | SYSTOLIC BLOOD PRESSURE: 165 MMHG | HEIGHT: 64 IN | TEMPERATURE: 98 F | DIASTOLIC BLOOD PRESSURE: 78 MMHG | WEIGHT: 162 LBS

## 2024-04-08 DIAGNOSIS — I10 ESSENTIAL HYPERTENSION: ICD-10-CM

## 2024-04-08 DIAGNOSIS — E11.51 TYPE 2 DIABETES MELLITUS WITH DIABETIC PERIPHERAL ANGIOPATHY WITHOUT GANGRENE, WITH LONG-TERM CURRENT USE OF INSULIN: Primary | ICD-10-CM

## 2024-04-08 DIAGNOSIS — Z79.4 TYPE 2 DIABETES MELLITUS WITH DIABETIC PERIPHERAL ANGIOPATHY WITHOUT GANGRENE, WITH LONG-TERM CURRENT USE OF INSULIN: Primary | ICD-10-CM

## 2024-04-08 DIAGNOSIS — G30.1 MODERATE LATE ONSET ALZHEIMER'S DEMENTIA WITH MOOD DISTURBANCE: ICD-10-CM

## 2024-04-08 DIAGNOSIS — F02.B3 MODERATE LATE ONSET ALZHEIMER'S DEMENTIA WITH MOOD DISTURBANCE: ICD-10-CM

## 2024-04-08 DIAGNOSIS — E78.2 MIXED HYPERLIPIDEMIA: ICD-10-CM

## 2024-04-08 PROCEDURE — 1101F PT FALLS ASSESS-DOCD LE1/YR: CPT | Mod: CPTII,,, | Performed by: FAMILY MEDICINE

## 2024-04-08 PROCEDURE — 3288F FALL RISK ASSESSMENT DOCD: CPT | Mod: CPTII,,, | Performed by: FAMILY MEDICINE

## 2024-04-08 PROCEDURE — 1126F AMNT PAIN NOTED NONE PRSNT: CPT | Mod: CPTII,,, | Performed by: FAMILY MEDICINE

## 2024-04-08 PROCEDURE — 99214 OFFICE O/P EST MOD 30 MIN: CPT | Mod: ,,, | Performed by: FAMILY MEDICINE

## 2024-04-08 PROCEDURE — 3078F DIAST BP <80 MM HG: CPT | Mod: CPTII,,, | Performed by: FAMILY MEDICINE

## 2024-04-08 PROCEDURE — 3077F SYST BP >= 140 MM HG: CPT | Mod: CPTII,,, | Performed by: FAMILY MEDICINE

## 2024-04-08 RX ORDER — AMLODIPINE BESYLATE 5 MG/1
5 TABLET ORAL DAILY
Qty: 90 TABLET | Refills: 3 | Status: SHIPPED | OUTPATIENT
Start: 2024-04-08 | End: 2025-04-08

## 2024-04-09 ENCOUNTER — OFFICE VISIT (OUTPATIENT)
Dept: NEUROLOGY | Facility: CLINIC | Age: 79
End: 2024-04-09
Payer: MEDICARE

## 2024-04-09 VITALS
SYSTOLIC BLOOD PRESSURE: 200 MMHG | HEIGHT: 64 IN | WEIGHT: 162.19 LBS | DIASTOLIC BLOOD PRESSURE: 86 MMHG | BODY MASS INDEX: 27.69 KG/M2

## 2024-04-09 DIAGNOSIS — F02.B0 MODERATE LATE ONSET ALZHEIMER'S DEMENTIA WITHOUT BEHAVIORAL DISTURBANCE, PSYCHOTIC DISTURBANCE, MOOD DISTURBANCE, OR ANXIETY: Primary | ICD-10-CM

## 2024-04-09 DIAGNOSIS — I10 HYPERTENSION, UNSPECIFIED TYPE: ICD-10-CM

## 2024-04-09 DIAGNOSIS — G30.1 MODERATE LATE ONSET ALZHEIMER'S DEMENTIA WITHOUT BEHAVIORAL DISTURBANCE, PSYCHOTIC DISTURBANCE, MOOD DISTURBANCE, OR ANXIETY: Primary | ICD-10-CM

## 2024-04-09 PROCEDURE — 1159F MED LIST DOCD IN RCRD: CPT | Mod: CPTII,S$GLB,, | Performed by: NURSE PRACTITIONER

## 2024-04-09 PROCEDURE — 3288F FALL RISK ASSESSMENT DOCD: CPT | Mod: CPTII,S$GLB,, | Performed by: NURSE PRACTITIONER

## 2024-04-09 PROCEDURE — 1101F PT FALLS ASSESS-DOCD LE1/YR: CPT | Mod: CPTII,S$GLB,, | Performed by: NURSE PRACTITIONER

## 2024-04-09 PROCEDURE — 99999 PR PBB SHADOW E&M-EST. PATIENT-LVL III: CPT | Mod: PBBFAC,,, | Performed by: NURSE PRACTITIONER

## 2024-04-09 PROCEDURE — 3079F DIAST BP 80-89 MM HG: CPT | Mod: CPTII,S$GLB,, | Performed by: NURSE PRACTITIONER

## 2024-04-09 PROCEDURE — 99213 OFFICE O/P EST LOW 20 MIN: CPT | Mod: S$GLB,,, | Performed by: NURSE PRACTITIONER

## 2024-04-09 PROCEDURE — 1160F RVW MEDS BY RX/DR IN RCRD: CPT | Mod: CPTII,S$GLB,, | Performed by: NURSE PRACTITIONER

## 2024-04-09 PROCEDURE — 3077F SYST BP >= 140 MM HG: CPT | Mod: CPTII,S$GLB,, | Performed by: NURSE PRACTITIONER

## 2024-04-09 RX ORDER — CLONIDINE HYDROCHLORIDE 0.1 MG/1
0.1 TABLET ORAL 2 TIMES DAILY
Qty: 60 TABLET | Refills: 1 | Status: SHIPPED | OUTPATIENT
Start: 2024-04-09 | End: 2024-05-13 | Stop reason: SDUPTHER

## 2024-04-09 NOTE — PROGRESS NOTES
Neurology Follow up Note    Subjective:         Patient ID: Tara Parker is a 78 y.o. female.    Chief Complaint: Dementia    HPI:            No notable worsening of memory  Still having difficulty with recall, repetitive conversations    Has gained weight     BP had been elevated; PCP added Norvasc yesterday    She states her BM have slowed     Appetite ok  Mood ok  Sleeping well       ROS: as per HPI, otherwise pertinent systems review is negative          Past Medical History:   Diagnosis Date    Anxiety and depression 4/8/2023    Essential hypertension 4/8/2023    Mixed hyperlipidemia 4/8/2023    Osteopenia of multiple sites 4/8/2023    Type 2 diabetes mellitus with diabetic peripheral angiopathy without gangrene, with long-term current use of insulin 4/8/2023       Past Surgical History:   Procedure Laterality Date    GI obstruction N/A     HYSTERECTOMY         Family History   Problem Relation Age of Onset    Coronary artery disease Mother 85    Lung disease Father 77        Chronic Obstructive Lung Disease       Social History     Socioeconomic History    Marital status:     Number of children: 3   Occupational History    Occupation: Retired: [employer]   Tobacco Use    Smoking status: Never    Smokeless tobacco: Never   Substance and Sexual Activity    Alcohol use: Not Currently    Drug use: Never    Sexual activity: Not Currently     Partners: Male     Social Determinants of Health     Financial Resource Strain: Low Risk  (4/8/2024)    Overall Financial Resource Strain (CARDIA)     Difficulty of Paying Living Expenses: Not hard at all   Food Insecurity: No Food Insecurity (4/8/2024)    Hunger Vital Sign     Worried About Running Out of Food in the Last Year: Never true     Ran Out of Food in the Last Year: Never true   Transportation Needs: No Transportation Needs (4/8/2024)    PRAPARE - Transportation     Lack of Transportation (Medical): No     Lack of Transportation (Non-Medical): No   Physical  "Activity: Sufficiently Active (4/8/2024)    Exercise Vital Sign     Days of Exercise per Week: 7 days     Minutes of Exercise per Session: 60 min   Stress: No Stress Concern Present (4/8/2024)    [place] [employer] - Occupational Stress Questionnaire     Feeling of Stress : Not at all   Social Connections: Moderately Integrated (4/8/2024)    Social Connection and Isolation Panel [NHANES]     Frequency of Communication with Friends and Family: More than three times a week     Frequency of Social Gatherings with Friends and Family: More than three times a week     Attends Mu-ism Services: More than 4 times per year     Active Member of Clubs or Organizations: Yes     Attends Club or Organization Meetings: More than 4 times per year     Marital Status:    Housing Stability: Unknown (4/8/2024)    Housing Stability Vital Sign     Unable to Pay for Housing in the Last Year: No     Unstable Housing in the Last Year: No       Review of patient's allergies indicates:  No Known Allergies    Current Outpatient Medications   Medication Instructions    amLODIPine (NORVASC) 5 mg, Oral, Daily    aspirin (ECOTRIN) 81 mg, Oral, Daily    blood-glucose sensor (FREESTYLE NISHA 3 SENSOR) Suzanne 1 Device, Misc.(Non-Drug; Combo Route), Every 14 days    LANTUS SOLOSTAR U-100 INSULIN 40 Units, Subcutaneous, Daily    olmesartan (BENICAR) 40 mg, Oral, Daily    pravastatin (PRAVACHOL) 40 mg, Oral    sertraline (ZOLOFT) 50 mg, Oral, Daily       Objective:      Exam:   Visit Vitals  BP (!) 200/86 (BP Location: Right arm, Patient Position: Sitting, BP Method: Medium (Manual))   Ht 5' 4" (1.626 m)   Wt 73.6 kg (162 lb 3.2 oz)   BMI 27.84 kg/m²       Physical Exam  Vitals reviewed.   Constitutional:       Appearance: Normal appearance.      Accompanied by: Pts son (Shaka)  is here today.   HENT:      Ears:      Comments: Hearing normal.     Mallampati:  Eyes:      Extraocular Movements: Extraocular movements intact.    "   VF's ok     PERRLA  Cardiovascular:      Rate and Rhythm: Normal rate and regular rhythm.      1+ pitting edema B ankles   Pulmonary:      Effort: Pulmonary effort is normal.      Breath sounds: Normal breath sounds.   Musculoskeletal:         General: Normal range of motion.   Skin:     General: Skin is warm and dry.   Neurological:      General: No focal deficit present.      Mental Status: alert; cog impairment      Speech: nml     Face: symmetric     Motor: nonlateralizing     Gait: unassisted; not parkinsonian  Psychiatric:         Mood and Affect: Mood normal.         Behavior: Behavior normal.         Assessment/Plan:   1. Moderate late onset Alzheimer's dementia without behavioral disturbance, psychotic disturbance, mood disturbance, or anxiety  Collectively decided to hold off on adding medication for cog dysfuntion    Safety discussed  She lives alone but her family checks on her frequently and provides meals    She does not drive    Her /100 at end of visit; I advise she go to the ER. We then spoke to PCP Dr. De La Torre on the phone. She did rx a new BP med yesterday but the patient has not started it yet. Dr. De La Torre also called in a rx for Clonidine while patient was in the clinic with me. She also advised them to reach out to cardiologist for further recs as well.     Follow up in about 1 year (around 4/9/2025), or if symptoms worsen or fail to improve.      Eddy Garcia, MSN, APRN, AGACNP-BC

## 2024-04-18 ENCOUNTER — CLINICAL SUPPORT (OUTPATIENT)
Dept: DIABETES | Facility: CLINIC | Age: 79
End: 2024-04-18
Payer: MEDICARE

## 2024-04-18 DIAGNOSIS — E11.51 TYPE 2 DIABETES MELLITUS WITH DIABETIC PERIPHERAL ANGIOPATHY WITHOUT GANGRENE, WITH LONG-TERM CURRENT USE OF INSULIN: ICD-10-CM

## 2024-04-18 DIAGNOSIS — Z79.4 TYPE 2 DIABETES MELLITUS WITH DIABETIC PERIPHERAL ANGIOPATHY WITHOUT GANGRENE, WITH LONG-TERM CURRENT USE OF INSULIN: ICD-10-CM

## 2024-04-18 PROCEDURE — 99212 OFFICE O/P EST SF 10 MIN: CPT | Mod: PBBFAC

## 2024-04-18 PROCEDURE — G0108 DIAB MANAGE TRN  PER INDIV: HCPCS | Mod: PBBFAC

## 2024-04-23 ENCOUNTER — TELEPHONE (OUTPATIENT)
Dept: DIABETES | Facility: CLINIC | Age: 79
End: 2024-04-23
Payer: MEDICARE

## 2024-04-23 VITALS — BODY MASS INDEX: 27.31 KG/M2 | HEIGHT: 64 IN | WEIGHT: 160 LBS

## 2024-04-23 DIAGNOSIS — E11.51 TYPE 2 DIABETES MELLITUS WITH DIABETIC PERIPHERAL ANGIOPATHY WITHOUT GANGRENE, WITH LONG-TERM CURRENT USE OF INSULIN: Primary | ICD-10-CM

## 2024-04-23 DIAGNOSIS — Z79.4 TYPE 2 DIABETES MELLITUS WITH DIABETIC PERIPHERAL ANGIOPATHY WITHOUT GANGRENE, WITH LONG-TERM CURRENT USE OF INSULIN: Primary | ICD-10-CM

## 2024-04-23 NOTE — PROGRESS NOTES
"Diabetes Care Specialist Progress Note  Author: Wendy Hussein RD  Date: 4/18/2024    Program Intake  Reason for Diabetes Program Visit:: Initial Diabetes Assessment  Current diabetes risk level:: high (risk score 2.5)  Permission to speak with others about care:: yes  Continuous Glucose Monitoring  Patient has CGM: Yes  Personal CGM type:: [employer] 3 w/ phone tye  GMI Date: 04/11/24  GMI Value: 6.6 %    Lab Results   Component Value Date    HGBA1C 7.8 (H) 01/08/2024       Clinical    Weight: 72.6 kg (160 lb)   Height: 5' 4" (162.6 cm)   Body mass index is 27.46 kg/m².    Problem Review  Active comorbidities affecting diabetes self-care.: yes  Comorbidities: Hypertension    Clinical Assessment  Current Diabetes Treatment: Insulin (Lantus 15 units/d)  Have you ever experienced hypoglycemia (low blood sugar)?: yes  In the last month, how often have you experienced low blood sugar?: other (see comments) (1% of the time per Sari report)  Are you able to tell when your blood sugar is low?: Yes  What symptoms do you experience?: Dizzy/Light-headed, Other ("passing out" 2 years ago)  How do you treat hypoglycemia (low blood sugar)?: other (milk, orange juice, crackers)  Have you ever experienced hyperglycemia (high blood sugar)?: yes  In the last month, how often have you experienced high blood sugar?: more than once a day (18% of the time per Sari reports)    Medication Information  Medication adherence impacting ability to self-manage diabetes?: No    Labs  Do you have regular lab work to monitor your medications?: Yes  Type of Regular Lab Work: A1c, BMP, CBC, Microalbumin, Cholesterol  Where do you get your labs drawn?: [facility]  Lab Compliance Barriers: No    Nutritional Status  Diet: Regular  Meal Plan 24 Hour Recall: Breakfast, Lunch, Dinner  Meal Plan 24 Hour Recall - Breakfast: chicken, toast  Meal Plan 24 Hour Recall - Dinner: chicken, green beans, vegetable soup, 5-6 crackers, water  Recent Changes in Weight: Weight " Loss  Was weight loss intentional or unintentional?: Unintentional (6 months ago per son)  Current nutritional status an area of need that is impacting patient's ability to self-manage diabetes?: No    Additional Social History    Support  Does anyone support you with your diabetes care?: yes  Who supports you?: son/daughter, family member (amado (Cecilia) & son (Phong))  Who takes you to your medical appointments?: son/daughter (son (Phong))  Does the current support meet the patient's needs?: Yes  Is Support an area impacting ability to self-manage diabetes?: No    Access to Mass Media & Technology  Does the patient have access to any of the following devices or technologies?: Smart phone, Internet Access  Media or technology needs impacting ability to self-manage diabetes?: No    Cognitive/Behavioral Health  Alert and Oriented: Yes  Difficulty Thinking: No  Requires Prompting: No  Requires assistance for routine expression?: No  Cognitive or behavioral barriers impacting ability to self-manage diabetes?: Yes (dementia)    Culture/Yazdanism  Culture or Orthodoxy beliefs that may impact ability to access healthcare: No    Communication  Language preference: English  Communication needs impacting ability to self-manage diabetes?: No    Health Literacy  Preferred Learning Method: Face to Face, Hands On  Health literacy needs impacting ability to self-manage diabetes?: No      Diabetes Self-Management Skills Assessment    Diabetes Disease Process/Treatment Options  Diabetes Disease Process/Treatment Options: Skills Assessment Completed: No  Deferred due to:: Time  Area of need?: Deferred    Nutrition/Healthy Eating  Method of carbohydrate measurement:: no method  Nutrition/Healthy Eating Skills Assessment Completed:: Yes  Assessment indicates:: Instruction Needed  Area of need?: Yes    Physical Activity/Exercise  Patient's daily activity level:: lightly active  Patient can identify forms of physical activity.:  yes  Stated forms of physical activity:: housework  Physical Activity/Exercise Skills Assessment Completed: : Yes  Assessment indicates:: Instruction Needed  Area of need?: Yes    Medications  Medication Skills Assessment Completed:: Yes  Assessment indicates:: Instruction Needed  Area of need?: Yes    Home Blood Glucose Monitoring  Patient states that blood sugar is checked at home daily.: yes  Monitoring Method:: personal continuous glucose monitor  Personal CGM type:: Justyna 3 w/ phone tye  Patient is able to use personal CGM appropriately.: yes (somewhat, pt did accidentally stop a sensor on 4/11)  CGM Report reviewed?: yes    [employer] 3 tye download (3/29/24 to 4/11/24): See media file for full report.  Noted a few episodes of significant post-prandial hyperglycemia.  Hypoglycemia as low as 54 sometimes occurring between 6PM-10PM.    Sensor usage: 97%  Average glucose: 139mg/dL  GMI: 6.6%    2% CGM readings greater than 250 mg/dL  16% CGM readings between 181-250 mg/dL  81% CGM readings in target glucose range of  mg/dL   1% CGM readings between 54-79%  0% CGM readings less than 54 mg/dL     Home Blood Glucose Monitoring Skills Assessment Completed: : Yes  Assessment indicates:: Instruction Needed  Area of need?: Yes    Acute Complications  Acute Complications Skills Assessment Completed: : Yes  Assessment indicates:: Instruction Needed  Area of need?: Yes    Chronic Complications  Chronic Complications Skills Assessment Completed: : No  Deferred due to:: Time  Area of need?: Deferred    Psychosocial/Coping  Psychosocial/Coping Skills Assessment Completed: : No  Deffered due to:: Time  Area of need?: Deferred      Assessment Summary and Plan    Based on today's diabetes care assessment, the following areas of need were identified:          4/18/2024    12:01 AM   Social   Support No   Access to Mass Media/Tech No   Cognitive/Behavioral Health Yes   Culture/Sabianism No   Communication No   Health Literacy No  "           4/18/2024    12:01 AM   Clinical   Medication Adherence No   Lab Compliance No   Nutritional Status No            4/18/2024    12:01 AM   Diabetes Self-Management Skills   Diabetes Disease Process/Treatment Options Deferred   Nutrition/Healthy Eating Yes - see care plan   Physical Activity/Exercise Yes - Pt reports activity consists of housework such as mopping in mornings.  Discussed effects of exercise on blood sugar.   Medication Yes - Stressed importance of consistency in timing of Lantus.   Home Blood Glucose Monitoring Yes - see care plan   Acute Complications Yes - Reviewed hyperglycemia/hypoglycemia, s/s, & treatment.  Pt/son/niece report pt with h/o severe hypoglycemia requiring assistance from family members 2 years ago.  Pt reports that she "passed out" during this episode.   Chronic Complications Deferred   Psychosocial/Coping Deferred          Today's interventions were provided through individual discussion, instruction, and written materials were provided.      Patient verbalized understanding of instruction and written materials.  Pt was able to return back demonstration of instructions today. Patient understood key points, needs reinforcement and further instruction.     Diabetes Self-Management Care Plan:    Today's Diabetes Self-Management Care Plan was developed with Tara's input. Tara has agreed to work toward the following goal(s) to improve his/her overall diabetes control.      Care Plan: Diabetes Management   Updates made since 3/24/2024 12:00 AM        Problem: Healthy Eating         Goal: Patient agrees to use plate method for balanced meals & will make sure to include a carbohydrate source at supper (6PM) to help prevent lows occurring from 6PM-10PM.    Start Date: 4/18/2024   Expected End Date: 5/8/2024   Priority: High   Barriers: Cognitive Deficits   Note:    4/18/24: Reviewed sources of carbohydrate & appropriate portion sizes.  Discussed plate method for diabetes.  " Encouraged use of starch/starchy vegetable serving at supper to help prevent lows from 6pm-10pm.  Patient agrees to use plate method for balanced meals & will make sure to include a carbohydrate source at supper (6PM) to help prevent lows.  Pt not appropriate for carbohydrate counting at this time.         Problem: Blood Glucose Self-Monitoring         Goal: Resume Justyna 3 use before next appointment.    Start Date: 4/18/2024   Expected End Date: 5/8/2024   Priority: Medium   Barriers: Cognitive Deficits   Note:    4/18/24: Pt using Justyna 3 with phone tye.  Son reports no active Justyna 3 sensor since 4/11 due to pt accidentally stopping sensor session.  Justyna customer support sending replacement sensor but pt has none in meantime.  Asssited son in setting up Justyna Link Up tye on his phone & connecting to his mother's Justyna account.  Noted high alert set at 230 & low alert at 80.  Patient to resume Justyna 3 use before next appointment.           Follow Up Plan     Pt with dementia, supported by her son & niece who are very involved in her medical care.  Niece present for visit via telephone.  Pt & son able to be here in person for visit.  Follow up in about 20 days (around 5/8/2024) for review of Justyna report, health rating, distress, disease process, insulin storage/injection sites/etc, chronic complications, & coping.      Today's care plan and follow up schedule was discussed with patient.  Tara verbalized understanding of the care plan, goals, and agrees to follow up plan.        The patient was encouraged to communicate with his/her health care provider/physician and care team regarding his/her condition(s) and treatment.  I provided the patient with my contact information today and encouraged to contact me via phone or [facility] Patient Portal as needed.     Length of Visit   Total Time: 60 Minutes

## 2024-04-24 ENCOUNTER — TELEPHONE (OUTPATIENT)
Dept: DIABETES | Facility: CLINIC | Age: 79
End: 2024-04-24
Payer: MEDICARE

## 2024-04-24 RX ORDER — GLUCAGON HCL 1 MG
1 VIAL (EA) INJECTION
Qty: 3 EACH | Refills: 2 | Status: SHIPPED | OUTPATIENT
Start: 2024-04-24 | End: 2024-04-26 | Stop reason: SDUPTHER

## 2024-04-24 NOTE — PROGRESS NOTES
Per dietitian's request glucagon emergency kit sent to the pharmacy-concerns about insurance coverage discussed.

## 2024-04-24 NOTE — TELEPHONE ENCOUNTER
"----- Message from Lilliana De La Torre MD sent at 4/24/2024  9:00 AM CDT -----  Good morning,  I have had discussions with Ms Parker  regarding management of low blood sugars-it has been my understanding that she has low blood sugars when she does not follow the diet appropriately.  For example skipping meals/skipping snacks.  I have sent an emergency hypo pen-unsure if insurance will cover.    Thank you for your support in helping to take care of Ms. Parker  Lilliana De La Torre M.D.  ----- Message -----  From: Wendy Hussein RD  Sent: 4/23/2024   2:17 PM CDT  To: MD Dr. Wil Knowles,    I am seeing Ms. Pacheco for diabetes education.  Thank you for the referral.  I have attached a copy of her most recent Justyna 3 continuous glucose monitor report for your review.  While the patient's fasting glucose levels seem mostly fine with an average glucose of 139 & estimated A1C of 6.6%, she is experiencing some hypoglycemia episodes between 6PM-10PM with glucose as low as 54 mg/dL.  Do you think she would be appropriate for a Gvoke hypopen or alternative for emergencies?  Her family also reports an episode a couple of years ago in which the patient "passed out" during a hypoglycemia episode.  Her current Lantus regimen is 15 units/d.  Please let me know what you think.    Thanks!  "

## 2024-04-25 LAB
LEFT EYE DM RETINOPATHY: NEGATIVE
RIGHT EYE DM RETINOPATHY: NEGATIVE

## 2024-04-25 NOTE — TELEPHONE ENCOUNTER
Shaka Anderson called stating that he is concerned about Ms. Villar blood sugar levels dropping.  He verified that she has been following a proper diet and is not skipping meals.  This morning around 3am her alarm went off with a level of around 70.  Shaka is aware that an emergency  hypoglycemic pen was sent to the pharmacy, but he has not had a chance to pick it up yet.      I have asked that he send over screenshots of the low glucose levels so you can review them.  Once he responds to the portal message attached to this patient message you will be able to see those.     He is wanting to know if he needs to bring her in for an evaluation or if there should be any medication changes.      Please advise...

## 2024-04-26 DIAGNOSIS — Z79.4 TYPE 2 DIABETES MELLITUS WITH DIABETIC PERIPHERAL ANGIOPATHY WITHOUT GANGRENE, WITH LONG-TERM CURRENT USE OF INSULIN: ICD-10-CM

## 2024-04-26 DIAGNOSIS — E11.51 TYPE 2 DIABETES MELLITUS WITH DIABETIC PERIPHERAL ANGIOPATHY WITHOUT GANGRENE, WITH LONG-TERM CURRENT USE OF INSULIN: ICD-10-CM

## 2024-04-26 RX ORDER — GLUCAGON HCL 1 MG
1 VIAL (EA) INJECTION
Qty: 3 EACH | Refills: 2 | Status: SHIPPED | OUTPATIENT
Start: 2024-04-26 | End: 2024-05-26

## 2024-04-26 NOTE — TELEPHONE ENCOUNTER
[facility] Called to see how you wanted the patient to inject Glucagon? Please advise? It seems it is to be injected 1 mg as needed for low blood sugar.

## 2024-04-28 NOTE — PROGRESS NOTES
Patient ID: [id number]     Chief Complaint: Follow-up      HPI:     Tara Parker is a 78 y.o. female here today for acute visit did not complete labs prior to visit.  Is scheduled for cataract surgery-she will need medical clearance prior to surgery.   1) Type 2 DM: Patient has been taking 15 units of insulin-is regularly having low blood sugars at night-sugars are going into the 70s sometimes as low as 50-patient has not been consistently eating snacks at night.  Family would like patient to be referred to an endocrinologist.  2) Hypertension: Patient has been taking medicine daily. BP is usually around 140/90. No symptoms associated with increased BP such as headache/ visual changes/ dizziness/ chest pain.   3)  Depression/Anxiety: Patient has been doing well on medication.  No noticeable side effects of the medication.    Past Medical History:   Diagnosis Date    Anxiety and depression 4/8/2023    Essential hypertension 4/8/2023    Mixed hyperlipidemia 4/8/2023    Osteopenia of multiple sites 4/8/2023    Type 2 diabetes mellitus with diabetic peripheral angiopathy without gangrene, with long-term current use of insulin 4/8/2023          Past Surgical History:   Procedure Laterality Date    GI obstruction N/A     HYSTERECTOMY          Social History     Tobacco Use    Smoking status: Never    Smokeless tobacco: Never   Substance Use Topics    Alcohol use: Not Currently    Drug use: Never        Social History     Socioeconomic History    Marital status:     Number of children: 3        Current Outpatient Medications   Medication Instructions    amLODIPine (NORVASC) 5 mg, Oral, Daily    aspirin (ECOTRIN) 81 mg, Oral, Daily    blood-glucose sensor (FREESTYLE NISHA 3 SENSOR) Suzanne 1 Device, Misc.(Non-Drug; Combo Route), Every 14 days    cloNIDine (CATAPRES) 0.1 mg, Oral, 2 times daily    glucagon HCL (GLUCAGON (HCL) EMERGENCY KIT) 1 mg, Injection, As needed (PRN)    LANTUS SOLOSTAR U-100 INSULIN 40 Units,  "Subcutaneous, Daily    olmesartan (BENICAR) 40 mg, Oral, Daily    pravastatin (PRAVACHOL) 40 mg, Oral    sertraline (ZOLOFT) 50 mg, Oral, Daily       Review of patient's allergies indicates:  No Known Allergies     Patient Care Team:  Lilliana De La Torre MD as PCP - General (Family Medicine)  Juan David Zapata MD as Consulting Physician (Neurology)  Roscoe Boyce MD as Consulting Physician (Cardiology)  Wendy Hussein RD as Diabetes Educator (Diabetes)       Subjective:     Review of Systems    12 point review of systems conducted, negative except as stated in the history of present illness. See HPI for details.      Objective:     Visit Vitals  /80 (BP Location: Right arm, Patient Position: Sitting)   Pulse 76   Ht 5' 4" (1.626 m)   Wt 73.1 kg (161 lb 3.2 oz)   SpO2 98%   BMI 27.67 kg/m²       Physical Exam    General: Alert and oriented, No acute distress.  Head: Normocephalic, Atraumatic.  Eye: Pupils are equal, round and reactive to light, Extraocular movements are intact, Sclera non-icteric.  Ears/Nose/Throat: Normal, Mucosa moist,Clear.  Neck/Thyroid: Supple, Non-tender  Respiratory: Clear to auscultation bilaterally  Cardiovascular: Regular rate and rhythm, S1/S2 normal  Gastrointestinal: Soft, Non-tender, Non-distended, Normal bowel sounds, No palpable organomegaly.  Musculoskeletal: Normal range of motion.  Integumentary: Warm, Dry  Extremities: No clubbing, cyanosis or edema  Neurologic: No focal deficits, Cranial Nerves II-XII are grossly intact  Psychiatric: Normal interaction, Coherent speech       Assessment:       ICD-10-CM ICD-9-CM   1. Type 2 diabetes mellitus with diabetic peripheral angiopathy without gangrene, with long-term current use of insulin  E11.51 250.70    Z79.4 443.81     V58.67   2. Essential hypertension  I10 401.9   3. Mixed hyperlipidemia  E78.2 272.2   4. Pre-operative clearance  Z01.818 V72.84        Plan:     1. Type 2 diabetes mellitus with diabetic peripheral angiopathy " without gangrene, with long-term current use of insulin  Lab Results   Component Value Date    HGBA1C 7.8 (H) 01/08/2024    HGBA1C 9.7 (H) 08/28/2023    HGBA1C 10.0 04/04/2023    CREATININE 0.94 01/08/2024    CREATININE 0.99 08/28/2023      Pathophysiology/treatment/dangers discussed.   Patient to decrease insulin to 10 units.  Blood sugar goal of less than 120 fasting and 140-150 about 2 hours after meals. Hemoglobin A1c needs to be rechecked. Goal less than 6.5.  Follow ADA Diet. Avoid soda, simple sweets, and limit rice/pasta/breads/starches (no more than 45-50 grams per meal).  Maintain healthy weight with goal BMI <30.  Exercise 5 times per week for 30 minutes per day.  Stressed importance of daily foot exams.Stressed importance of annual dilated eye exam.  Per family's request referral to endocrinologist.  - Ambulatory referral/consult to Endocrinology; Future  - Hemoglobin A1C; Future  - Urinalysis; Future  - Microalbumin/Creatinine Ratio, Urine; Future      2. Essential hypertension  Patient has been taking medication. Blood pressure goal of less than 130/80-blood pressure is stable. Continue to encourage diet and activity modifications. Including stress management. Pathophysiology/treatment/dangers discussed.  - CBC Auto Differential; Future  - Comprehensive Metabolic Panel; Future    3. Mixed hyperlipidemia  Lab Results   Component Value Date    CHOL 260 (H) 08/28/2023    TRIG 129 08/28/2023    HDL 54 08/28/2023     Pathophysiology/treatment/dangers discussed. Patient to continue diet modification/medication check labs management based on finding.   - Lipid Panel; Future  - TSH; Future    4. Pre-operative clearance  Patient is being evaluated for medical clearance.  Risk versus benefits of surgery discussed with patient.  ASA class I - Patient appears to be in good general health and II - Patient appears to have mild systemic disease, adequately controlled.  Patient is medically cleared for surgery/Chronic  conditions are optimally controlled..        Follow up if symptoms worsen or fail to improve, for Medicare Wellness. In addition to their scheduled follow up, the patient has also been instructed to follow up on as needed basis.     Future Appointments   Date Time Provider Department Center   8/29/2024 10:30 AM [facility]   10/14/2024 10:30 AM Eddy Garcia, AGACNP-BC [facility] [address] Alekslois De La Torre MD

## 2024-05-08 ENCOUNTER — PATIENT OUTREACH (OUTPATIENT)
Dept: ADMINISTRATIVE | Facility: HOSPITAL | Age: 79
End: 2024-05-08
Payer: MEDICARE

## 2024-05-08 ENCOUNTER — CLINICAL SUPPORT (OUTPATIENT)
Dept: DIABETES | Facility: CLINIC | Age: 79
End: 2024-05-08
Payer: MEDICARE

## 2024-05-08 ENCOUNTER — OFFICE VISIT (OUTPATIENT)
Dept: FAMILY MEDICINE | Facility: CLINIC | Age: 79
End: 2024-05-08
Payer: MEDICARE

## 2024-05-08 VITALS
OXYGEN SATURATION: 98 % | HEART RATE: 76 BPM | WEIGHT: 161.19 LBS | SYSTOLIC BLOOD PRESSURE: 132 MMHG | BODY MASS INDEX: 27.52 KG/M2 | HEIGHT: 64 IN | DIASTOLIC BLOOD PRESSURE: 80 MMHG

## 2024-05-08 VITALS — HEIGHT: 64 IN | BODY MASS INDEX: 27.66 KG/M2 | WEIGHT: 162 LBS

## 2024-05-08 DIAGNOSIS — I10 ESSENTIAL HYPERTENSION: ICD-10-CM

## 2024-05-08 DIAGNOSIS — E11.51 TYPE 2 DIABETES MELLITUS WITH DIABETIC PERIPHERAL ANGIOPATHY WITHOUT GANGRENE, WITH LONG-TERM CURRENT USE OF INSULIN: Primary | ICD-10-CM

## 2024-05-08 DIAGNOSIS — E78.2 MIXED HYPERLIPIDEMIA: ICD-10-CM

## 2024-05-08 DIAGNOSIS — Z79.4 TYPE 2 DIABETES MELLITUS WITH DIABETIC PERIPHERAL ANGIOPATHY WITHOUT GANGRENE, WITH LONG-TERM CURRENT USE OF INSULIN: Primary | ICD-10-CM

## 2024-05-08 DIAGNOSIS — Z01.818 PRE-OPERATIVE CLEARANCE: ICD-10-CM

## 2024-05-08 PROCEDURE — G0108 DIAB MANAGE TRN  PER INDIV: HCPCS | Mod: PBBFAC

## 2024-05-08 PROCEDURE — 3079F DIAST BP 80-89 MM HG: CPT | Mod: CPTII,,, | Performed by: FAMILY MEDICINE

## 2024-05-08 PROCEDURE — 99212 OFFICE O/P EST SF 10 MIN: CPT | Mod: PBBFAC

## 2024-05-08 PROCEDURE — 3075F SYST BP GE 130 - 139MM HG: CPT | Mod: CPTII,,, | Performed by: FAMILY MEDICINE

## 2024-05-08 PROCEDURE — 3288F FALL RISK ASSESSMENT DOCD: CPT | Mod: CPTII,,, | Performed by: FAMILY MEDICINE

## 2024-05-08 PROCEDURE — 1126F AMNT PAIN NOTED NONE PRSNT: CPT | Mod: CPTII,,, | Performed by: FAMILY MEDICINE

## 2024-05-08 PROCEDURE — 1160F RVW MEDS BY RX/DR IN RCRD: CPT | Mod: CPTII,,, | Performed by: FAMILY MEDICINE

## 2024-05-08 PROCEDURE — 1101F PT FALLS ASSESS-DOCD LE1/YR: CPT | Mod: CPTII,,, | Performed by: FAMILY MEDICINE

## 2024-05-08 PROCEDURE — 99214 OFFICE O/P EST MOD 30 MIN: CPT | Mod: ,,, | Performed by: FAMILY MEDICINE

## 2024-05-08 PROCEDURE — 1159F MED LIST DOCD IN RCRD: CPT | Mod: CPTII,,, | Performed by: FAMILY MEDICINE

## 2024-05-08 NOTE — PROGRESS NOTES
"Diabetes Care Specialist Follow-up Note  Author: Wendy Hussein RD  Date: 5/8/2024    Program Intake  Reason for Diabetes Program Visit:: Intervention  Type of Intervention:: Individual  Individual: Education  Education: Self-Management Skill Review  Current diabetes risk level:: high (risk score 2.5)  In the last 12 months, have you:: used emergency room services  Was the ER or hospital admission related to diabetes?: No  Continuous Glucose Monitoring  GMI Date: 05/07/24  GMI Value: 6.8 %    Lab Results   Component Value Date    HGBA1C 7.8 (H) 01/08/2024     A1c Pre Diabetes Care Specialist Intervention:  7.8% (1/8/24)    Clinical    Weight: 73.5 kg (162 lb)   Height: 5' 4" (162.6 cm)   Body mass index is 27.81 kg/m².  Wt gain of 2 lbs since last visit on 04/18/24      Clinical Assessment  Have you ever experienced hyperglycemia (high blood sugar)?: yes  Are you able to tell when your blood sugar is high?: Yes  What are your symptoms?: other (see comments) (irritability)      Medications:  - Lantus 15 units/d      SMBG:   Payveris 3 yte download (04/24/24 to 05/07/24): See media file for full report.  Noted hypoglycemia 0% but episodes occurring on 9 separate days throughout the 2 week period.  Now with pattern of hypoglycemia in early AM.  Post prandial hyperglycemia also noted.  Overall, pt meeting goal for TIR (75%).    Sensor usage: 95%  Average glucose: 147mg/dL  GMI: 6.8%  Variability: 32.6%    2% CGM readings greater than 250 mg/dL  23% CGM readings between 181-250 mg/dL  75% CGM readings in target glucose range of  mg/dL   0% CGM readings between 54-79%  0% CGM readings less than 54 mg/dL       Diabetes Self-Management Skills Assessment     Diabetes Disease Process/Treatment Options  Patient/caregiver knows what type of diabetes they have.: no  Diabetes Disease Process/Treatment Options: Skills Assessment Completed: Yes  Assessment indicates:: Instruction Needed  Area of need?: Yes    Chronic " Complications  Chronic Complications Skills Assessment Completed: : No  Deferred due to:: Time  Area of need?: Deferred    Psychosocial/Coping  Psychosocial/Coping Skills Assessment Completed: : No  Deffered due to:: Time  Area of need?: Deferred      During today's follow-up visit,  the following areas required further assessment and content was provided/reviewed.    Based on today's diabetes care assessment, the following areas of need were identified:            5/8/2024    12:02 AM   Diabetes Self-Management Skills   Diabetes Disease Process/Treatment Options Yes - Discussed types of diabetes & pathophysiology.     Chronic Complications Deferred   Psychosocial/Coping Deferred        Today's interventions were provided through individual discussion.    Patient verbalized understanding of instruction.  Pt was able to return back demonstration of instructions today. Patient understood key points, needs reinforcement and further instruction.     Diabetes Self-Management Care Plan Review and Evaluation of Progress:    During today's follow-up Kaya Diabetes Self-Management Care Plan progress was reviewed and progress was evaluated including his/her input. Tara has agreed to continue his/her journey to improve/maintain overall diabetes control by continuing to set health goals. See care plan progress below.      Care Plan: Diabetes Management   Updates made since 4/8/2024 12:00 AM        Problem: Healthy Eating         Goal: Patient agrees to use plate method for balanced meals & will make sure to include a carbohydrate source at supper (6PM) to help prevent lows occurring from 6PM-10PM.    Start Date: 4/18/2024   Expected End Date: 4/18/2025   This Visit's Progress: On track   Priority: High   Barriers: Cognitive Deficits   Note:    5/8/24: Had some difficulty obtaining information re: PO intake from patient due to some confusion & difficulty remaining on topic when answering questions.  Pt seemed to remember  the concepts of plate method for diabetes including having a source of carbohydrate with meals.  Again reiterated importance of including carb with dinner meal to help keep glucose levels stable throughout the night.  Pt did not keep food logs but son reports that he did try to get her to do so.  Explained that food logs would be useful in remembering meals for review with Justyna report.    4/18/24: Reviewed sources of carbohydrate & appropriate portion sizes.  Discussed plate method for diabetes.  Encouraged use of starch/starchy vegetable serving at supper to help prevent lows from 6pm-10pm.  Patient agrees to use plate method for balanced meals & will make sure to include a carbohydrate source at supper (6PM) to help prevent lows.  Pt not appropriate for carbohydrate counting at this time.         Problem: Blood Glucose Self-Monitoring         Goal: Resume Justyna 3 use before next appointment. Completed 5/8/2024   Start Date: 4/18/2024   Expected End Date: 5/8/2024   This Visit's Progress: Met   Priority: Medium   Barriers: Cognitive Deficits   Note:    5/8/24: Pt presents to visit with active justyna 3 sensor which has remained 95% active over the past 2 weeks.  Discussed use of [email or web address] website for viewing full Justyna reports as desired.  Son has been using Justyna Link Up tye on his phone to help alert his mother when she is experiencing hypoglycemia.    4/18/24: Pt using Justyna 3 with phone tye.  Son reports no active Justyna 3 sensor since 4/11 due to pt accidentally stopping sensor session.  Justyna customer support sending replacement sensor but pt has none in meantime.  Asssited son in setting up Justyna Link Up tye on his phone & connecting to his mother's [employer] account.  Noted high alert set at 230 & low alert at 80.  Patient to resume Justyna 3 use before next appointment.         Problem: Medications         Goal: Patient will take Lantus at 8AM (2 hours after breakfast) daily.  Neice or son will call to remind  patient to take Lantus & patient will take dose during phone call.    Start Date: 5/8/2024   Expected End Date: 4/18/2025   Priority: High   Barriers: Cognitive Deficits; Knowledge deficit   Note:    5/8/24: When asked what time she takes Lantus, pt says that she takes 2 hours after breakfast depending on glucose.  She states that if her glucose is 120 she may wait until after lunch to take her Lantus dose or may take if her glucose level increases later in the day.  Again stressed importance of taking Lantus at a consistent time every day.  Inconsistency in timing of Lantus dose may be contributing to nighttime hypoglycemia.  Son very interested in referral to endocrinology with no desire to try switching from insulin to an oral or once weekly injectable medication with less risk of hypoglycemia.  Encouraged pt to discuss possibility of referral with pt's PCP today.           Follow Up Plan     Son does not wish to schedule future follow up visits at this time but would like to first see what happens with endocrinology referral.  Follow up if symptoms worsen or fail to improve as desired for review of Justyna report, health rating, distress, insulin storage/injection sites/etc, chronic complications, & coping.    Today's care plan and follow up schedule was discussed with patient.  Tara verbalized understanding of the care plan, goals, and agrees to follow up plan.        The patient was encouraged to communicate with his/her health care provider/physician and care team regarding his/her condition(s) and treatment.  I provided the patient with my contact information today and encouraged to contact me via phone or [facility] Patient Portal as needed.     Length of Visit   Total Time: 60 Minutes

## 2024-05-08 NOTE — PROGRESS NOTES
Health Maintenance Topic(s) Outreach Outcomes & Actions Taken:    Eye Exam - Outreach Outcomes & Actions Taken  : Diabetic Eye External Records Uploaded, Care Team & History Updated if Applicable     Additional Notes:  Request Diabetic Eye Exam: Dr Jose Daniel Stewart

## 2024-05-08 NOTE — LETTER
AUTHORIZATION FOR RELEASE OF   CONFIDENTIAL INFORMATION    Dear Dr Jose Daniel Villarreal,   Fax: [phone]    We are seeing Tara Parker, date of birth 1945, in the clinic at [facility]. Lilliana De La Torre MD is the patient's PCP. Tara Parker has an outstanding lab/procedure at the time we reviewed her chart. In order to help keep her health information updated, she has authorized us to request the following medical record(s):        (  )  MAMMOGRAM                                      (  )  COLONOSCOPY      (  )  PAP SMEAR                                          (  )  OUTSIDE LAB RESULTS     (  )  DEXA SCAN                                          (X  )  EYE EXAM            (  )  FOOT EXAM                                          (  )  ENTIRE RECORD     (  )  OUTSIDE IMMUNIZATIONS                 (  )  _______________         Please fax records to [facility], Gautam, Indira, MD, [phone]     If you have any question or concerns. Please call Rory FOWLER CCC @ [phone]          Patient Name: Tara Parker  : 1945  Patient Phone #: [phone]

## 2024-05-09 NOTE — PROGRESS NOTES
Records Received, hyper-linked into chart at this time. The following record(s)  below were uploaded for Health Maintenance .           4/25/2024 DM EYE EXAM: Per Nurse No Diabetic Retinopathy

## 2024-05-13 ENCOUNTER — LAB VISIT (OUTPATIENT)
Dept: LAB | Facility: HOSPITAL | Age: 79
End: 2024-05-13
Attending: FAMILY MEDICINE
Payer: MEDICARE

## 2024-05-13 ENCOUNTER — TELEPHONE (OUTPATIENT)
Dept: FAMILY MEDICINE | Facility: CLINIC | Age: 79
End: 2024-05-13
Payer: MEDICARE

## 2024-05-13 DIAGNOSIS — E11.51 TYPE 2 DIABETES MELLITUS WITH DIABETIC PERIPHERAL ANGIOPATHY WITHOUT GANGRENE, WITH LONG-TERM CURRENT USE OF INSULIN: ICD-10-CM

## 2024-05-13 DIAGNOSIS — I10 ESSENTIAL HYPERTENSION: ICD-10-CM

## 2024-05-13 DIAGNOSIS — Z79.4 TYPE 2 DIABETES MELLITUS WITH DIABETIC PERIPHERAL ANGIOPATHY WITHOUT GANGRENE, WITH LONG-TERM CURRENT USE OF INSULIN: ICD-10-CM

## 2024-05-13 LAB
ALBUMIN SERPL-MCNC: 3.7 G/DL (ref 3.4–4.8)
ALBUMIN/GLOB SERPL: 1.1 RATIO (ref 1.1–2)
ALP SERPL-CCNC: 96 UNIT/L (ref 40–150)
ALT SERPL-CCNC: 12 UNIT/L (ref 0–55)
AST SERPL-CCNC: 22 UNIT/L (ref 5–34)
BILIRUB SERPL-MCNC: 0.5 MG/DL
BUN SERPL-MCNC: 31.4 MG/DL (ref 9.8–20.1)
CALCIUM SERPL-MCNC: 9.3 MG/DL (ref 8.4–10.2)
CHLORIDE SERPL-SCNC: 108 MMOL/L (ref 98–107)
CHOLEST SERPL-MCNC: 280 MG/DL
CHOLEST/HDLC SERPL: 5 {RATIO} (ref 0–5)
CO2 SERPL-SCNC: 27 MMOL/L (ref 23–31)
CREAT SERPL-MCNC: 1.21 MG/DL (ref 0.55–1.02)
CREAT UR-MCNC: 106.3 MG/DL (ref 45–106)
EST. AVERAGE GLUCOSE BLD GHB EST-MCNC: 171.4 MG/DL
GFR SERPLBLD CREATININE-BSD FMLA CKD-EPI: 46 ML/MIN/1.73/M2
GLOBULIN SER-MCNC: 3.3 GM/DL (ref 2.4–3.5)
GLUCOSE SERPL-MCNC: 128 MG/DL (ref 82–115)
HBA1C MFR BLD: 7.6 %
HDLC SERPL-MCNC: 62 MG/DL (ref 35–60)
LDLC SERPL CALC-MCNC: 202 MG/DL (ref 50–140)
MICROALBUMIN UR-MCNC: 680.7 UG/ML
MICROALBUMIN/CREAT RATIO PNL UR: 640.4 MG/GM CR (ref 0–30)
POTASSIUM SERPL-SCNC: 4.5 MMOL/L (ref 3.5–5.1)
PROT SERPL-MCNC: 7 GM/DL (ref 5.8–7.6)
SODIUM SERPL-SCNC: 140 MMOL/L (ref 136–145)
TRIGL SERPL-MCNC: 80 MG/DL (ref 37–140)
VLDLC SERPL CALC-MCNC: 16 MG/DL

## 2024-05-13 PROCEDURE — 83036 HEMOGLOBIN GLYCOSYLATED A1C: CPT

## 2024-05-13 PROCEDURE — 82043 UR ALBUMIN QUANTITATIVE: CPT

## 2024-05-13 PROCEDURE — 80053 COMPREHEN METABOLIC PANEL: CPT

## 2024-05-13 PROCEDURE — 80061 LIPID PANEL: CPT

## 2024-05-13 PROCEDURE — 36415 COLL VENOUS BLD VENIPUNCTURE: CPT

## 2024-05-13 RX ORDER — CLONIDINE HYDROCHLORIDE 0.1 MG/1
0.1 TABLET ORAL
Qty: 60 TABLET | Refills: 1 | Status: SHIPPED | OUTPATIENT
Start: 2024-05-13

## 2024-05-13 NOTE — TELEPHONE ENCOUNTER
----- Message from Lilliana De La Torre MD sent at 5/13/2024  2:29 PM CDT -----  Patient needs appointment  ----- Message -----  From: Lab, Background User  Sent: 5/13/2024   1:48 PM CDT  To: Lilliana De La Torre MD

## 2024-05-17 NOTE — PROGRESS NOTES
Patient ID: [id number]     Chief Complaint:  Type 2 diabetes    HPI:     Tara Parker is a 78 y.o. female here today with her son/niece is on the phone-  to discuss test results:   1) Type 2 DM: Patient has been taking 10 units of insulin-continuing to have occasional sugar above 350-is still having low sugars at night-family finds that this is related to when she has not eaten at night.  Diet modifications discussed at length.  Awaiting evaluation by endocrinologist.   2) Hypertension: Patient has been taking medicine daily. BP is normal at home. No symptoms associated with increased BP such as headache/ visual changes/ dizziness/ chest pain.   3) Hyperlipidemia: Patient is taking medication at Night-no side effects related to the medication.             Past Medical History:   Diagnosis Date    Anxiety and depression 4/8/2023    Essential hypertension 4/8/2023    Mixed hyperlipidemia 4/8/2023    Osteopenia of multiple sites 4/8/2023    Type 2 diabetes mellitus with diabetic peripheral angiopathy without gangrene, with long-term current use of insulin 4/8/2023        Past Surgical History:   Procedure Laterality Date    GI obstruction N/A     HYSTERECTOMY          Social History     Tobacco Use    Smoking status: Never    Smokeless tobacco: Never   Substance Use Topics    Alcohol use: Not Currently    Drug use: Never        Social History     Socioeconomic History    Marital status:     Number of children: 3        Current Outpatient Medications   Medication Instructions    amLODIPine (NORVASC) 5 mg, Oral, Daily    aspirin (ECOTRIN) 81 mg, Oral, Daily    blood-glucose sensor (FREESTYLE NISHA 3 SENSOR) Suzanne 1 Device, Misc.(Non-Drug; Combo Route), Every 14 days    cloNIDine (CATAPRES) 0.1 mg, Oral, As needed (PRN)    glucagon HCL (GLUCAGON (HCL) EMERGENCY KIT) 1 mg, Injection, As needed (PRN)    LANTUS SOLOSTAR U-100 INSULIN 40 Units, Subcutaneous, Daily    olmesartan (BENICAR) 40 mg, Oral, Daily     "rosuvastatin (CRESTOR) 10 mg, Oral, Daily    sertraline (ZOLOFT) 50 mg, Oral, Daily       Review of patient's allergies indicates:  No Known Allergies     Patient Care Team:  Lilliana De La Torre MD as PCP - General (Family Medicine)  Juan David Zapata MD as Consulting Physician (Neurology)  Roscoe Boyce MD as Consulting Physician (Cardiology)  Wendy Hussein RD as Diabetes Educator (Diabetes)       Subjective:     Review of Systems    12 point review of systems conducted, negative except as stated in the history of present illness. See HPI for details.      Objective:     Visit Vitals  /76 (BP Location: Left arm, Patient Position: Sitting)   Pulse 82   Ht 5' 4" (1.626 m)   Wt 72.6 kg (160 lb)   SpO2 97%   BMI 27.46 kg/m²       Physical Exam    General: Alert and oriented, No acute distress.  Head: Normocephalic, Atraumatic.  Eye: Pupils are equal, round and reactive to light, Extraocular movements are intact, Sclera non-icteric.  Ears/Nose/Throat: Normal, Mucosa moist,Clear.  Neck/Thyroid: Supple, Non-tender  Respiratory: Clear to auscultation bilaterally  Cardiovascular: Regular rate and rhythm, S1/S2 normal  Gastrointestinal: Soft, Non-tender, Non-distended, Normal bowel sounds, No palpable organomegaly.  Musculoskeletal: Normal range of motion.  Integumentary: Warm, Dry  Extremities: No clubbing, cyanosis or edema  Neurologic: No focal deficits, Cranial Nerves II-XII are grossly intact  Psychiatric: Normal interaction, Coherent speech       Assessment:       ICD-10-CM ICD-9-CM   1. Type 2 diabetes mellitus with diabetic peripheral angiopathy without gangrene, with long-term current use of insulin  E11.51 250.70    Z79.4 443.81     V58.67   2. Mixed hyperlipidemia  E78.2 272.2   3. Essential hypertension  I10 401.9        Plan:      1. Type 2 diabetes mellitus with diabetic peripheral angiopathy without gangrene, with long-term current use of insulin  Lab Results   Component Value Date    HGBA1C 7.6 (H) " 05/13/2024    HGBA1C 7.8 (H) 01/08/2024    HGBA1C 10.0 04/04/2023    .00 (H) 05/13/2024    CREATININE 1.21 (H) 05/13/2024    CREATININE 0.99 08/28/2023      Pathophysiology/treatment/dangers discussed.   Patient to continue medication.  Blood sugar goal of less than 120 fasting and 140-150 about 2 hours after meal.   Current HA1C is 7.6. Hemoglobin A1c needs to be rechecked in 3 months. Goal less than 6.5.  Follow ADA Diet. Avoid soda, simple sweets, and limit rice/pasta/breads/starches (no more than 45-50 grams per meal).  Maintain healthy weight with goal BMI <30.  Exercise 5 times per week for 30 minutes per day.  Stressed importance of daily foot exams.Stressed importance of annual dilated eye exam.    - Comprehensive Metabolic Panel; Future  - Lipid Panel; Future  - Hemoglobin A1C; Future  - Urinalysis; Future  - Microalbumin/Creatinine Ratio, Urine; Future    2. Mixed hyperlipidemia  Lab Results   Component Value Date    CHOL 280 (H) 05/13/2024    CHOL 260 (H) 08/28/2023    .00 (H) 05/13/2024    TRIG 80 05/13/2024    TRIG 129 08/28/2023    HDL 62 (H) 05/13/2024    HDL 54 08/28/2023     Pathophysiology/treatment/dangers discussed. Patient to continue diet modification-stopped pravastatin-Rx Crestor 10 mg-in the past patient was unable to take Crestor 40 mg due to muscle ache and joint pain/Co Q10 200 mg.   Total cholesterol 280 ( Goal less than 200) HDL 62 ( Goal high as possible)  ( Goal less than 100) Triglycerides 80 ( Goal less than 150)    - TSH; Future  - rosuvastatin (CRESTOR) 10 MG tablet; Take 1 tablet (10 mg total) by mouth once daily.  Dispense: 90 tablet; Refill: 3  - TSH; Future    3. Essential hypertension  Patient has been taking medication-Norvasc 5 mg-olmesartan 40mg-clonidine is to be used only as needed for high blood pressure. Blood pressure goal of less than 130/80-blood pressure is stable. Continue to encourage diet and activity modifications. Including stress  management. Pathophysiology/treatment/dangers discussed.    - CBC Auto Differential; Future      Follow up if symptoms worsen or fail to improve, for Medicare Wellness. In addition to their scheduled follow up, the patient has also been instructed to follow up on as needed basis.     Future Appointments   Date Time Provider Department Center   8/29/2024 10:30 AM Lilliana De La Torre MD [facility]   10/14/2024 10:30 AM Eddy Garcia AGACNP[facility] [address] Aleks De La Torre MD

## 2024-05-20 ENCOUNTER — OFFICE VISIT (OUTPATIENT)
Dept: FAMILY MEDICINE | Facility: CLINIC | Age: 79
End: 2024-05-20
Payer: MEDICARE

## 2024-05-20 VITALS
HEIGHT: 64 IN | BODY MASS INDEX: 27.31 KG/M2 | OXYGEN SATURATION: 97 % | SYSTOLIC BLOOD PRESSURE: 138 MMHG | WEIGHT: 160 LBS | HEART RATE: 82 BPM | DIASTOLIC BLOOD PRESSURE: 76 MMHG

## 2024-05-20 DIAGNOSIS — Z79.4 TYPE 2 DIABETES MELLITUS WITH DIABETIC PERIPHERAL ANGIOPATHY WITHOUT GANGRENE, WITH LONG-TERM CURRENT USE OF INSULIN: Primary | ICD-10-CM

## 2024-05-20 DIAGNOSIS — F32.A ANXIETY AND DEPRESSION: ICD-10-CM

## 2024-05-20 DIAGNOSIS — I10 ESSENTIAL HYPERTENSION: ICD-10-CM

## 2024-05-20 DIAGNOSIS — E11.51 TYPE 2 DIABETES MELLITUS WITH DIABETIC PERIPHERAL ANGIOPATHY WITHOUT GANGRENE, WITH LONG-TERM CURRENT USE OF INSULIN: Primary | ICD-10-CM

## 2024-05-20 DIAGNOSIS — F41.9 ANXIETY AND DEPRESSION: ICD-10-CM

## 2024-05-20 DIAGNOSIS — E78.2 MIXED HYPERLIPIDEMIA: ICD-10-CM

## 2024-05-20 PROCEDURE — 2023F DILAT RTA XM W/O RTNOPTHY: CPT | Mod: CPTII,,, | Performed by: FAMILY MEDICINE

## 2024-05-20 PROCEDURE — 99214 OFFICE O/P EST MOD 30 MIN: CPT | Mod: ,,, | Performed by: FAMILY MEDICINE

## 2024-05-20 PROCEDURE — 3075F SYST BP GE 130 - 139MM HG: CPT | Mod: CPTII,,, | Performed by: FAMILY MEDICINE

## 2024-05-20 PROCEDURE — 3288F FALL RISK ASSESSMENT DOCD: CPT | Mod: CPTII,,, | Performed by: FAMILY MEDICINE

## 2024-05-20 PROCEDURE — 3078F DIAST BP <80 MM HG: CPT | Mod: CPTII,,, | Performed by: FAMILY MEDICINE

## 2024-05-20 PROCEDURE — 1160F RVW MEDS BY RX/DR IN RCRD: CPT | Mod: CPTII,,, | Performed by: FAMILY MEDICINE

## 2024-05-20 PROCEDURE — 1126F AMNT PAIN NOTED NONE PRSNT: CPT | Mod: CPTII,,, | Performed by: FAMILY MEDICINE

## 2024-05-20 PROCEDURE — 1159F MED LIST DOCD IN RCRD: CPT | Mod: CPTII,,, | Performed by: FAMILY MEDICINE

## 2024-05-20 PROCEDURE — 1101F PT FALLS ASSESS-DOCD LE1/YR: CPT | Mod: CPTII,,, | Performed by: FAMILY MEDICINE

## 2024-05-20 RX ORDER — SERTRALINE HYDROCHLORIDE 50 MG/1
50 TABLET, FILM COATED ORAL DAILY
Qty: 90 TABLET | Refills: 1 | Status: SHIPPED | OUTPATIENT
Start: 2024-05-20 | End: 2025-05-20

## 2024-05-20 RX ORDER — ROSUVASTATIN CALCIUM 10 MG/1
10 TABLET, COATED ORAL DAILY
Qty: 90 TABLET | Refills: 3 | Status: SHIPPED | OUTPATIENT
Start: 2024-05-20 | End: 2025-05-20

## 2024-05-20 RX ORDER — ASPIRIN 81 MG/1
81 TABLET ORAL DAILY
Qty: 90 TABLET | Refills: 3 | Status: SHIPPED | OUTPATIENT
Start: 2024-05-20

## 2024-05-24 PROBLEM — H25.12 AGE-RELATED NUCLEAR CATARACT, LEFT EYE: Status: ACTIVE | Noted: 2024-05-24

## 2024-05-24 PROBLEM — H25.012 CORTICAL AGE-RELATED CATARACT, LEFT EYE: Status: ACTIVE | Noted: 2024-05-24

## 2024-05-30 ENCOUNTER — ANESTHESIA EVENT (OUTPATIENT)
Dept: SURGERY | Facility: HOSPITAL | Age: 79
End: 2024-05-30
Payer: MEDICARE

## 2024-05-30 NOTE — ANESTHESIA PREPROCEDURE EVALUATION
05/30/2024  Tara Parker is a 78 y.o., female.      Pre-op Assessment    I have reviewed the Patient Summary Reports.    I have reviewed the NPO Status.   I have reviewed the Medications.     Review of Systems  Anesthesia Hx:  No problems with previous Anesthesia             Denies Family Hx of Anesthesia complications.    Denies Personal Hx of Anesthesia complications.                    Social:  Non-Smoker       Cardiovascular:     Hypertension, well controlled                                        Pulmonary:  Pulmonary Normal                       Renal/:  Renal/ Normal                 Hepatic/GI:  Hepatic/GI Normal                 Neurological:  Neurology Normal                                      Endocrine:  Diabetes, well controlled, type 2           Psych:  Psychiatric History anxiety depression EARLY ALZHEIMERS               Physical Exam  General: Well nourished    Airway:  Mallampati: II / I  Mouth Opening: Normal  TM Distance: Normal  Tongue: Normal  Neck ROM: Normal ROM    Dental:  Dentures    Chest/Lungs:  Clear to auscultation    Heart:  Rate: Normal  Rhythm: Regular Rhythm  Sounds: Normal        Anesthesia Plan  Type of Anesthesia, risks & benefits discussed:    Anesthesia Type: MAC  Intra-op Monitoring Plan: Standard ASA Monitors  Post Op Pain Control Plan: multimodal analgesia  Induction:  IV  Airway Plan: Direct  Informed Consent: Informed consent signed with the Patient and all parties understand the risks and agree with anesthesia plan.  All questions answered.   ASA Score: 3  Day of Surgery Review of History & Physical: I have interviewed and examined the patient. I have reviewed the patient's H&P dated: There are no significant changes.     Ready For Surgery From Anesthesia Perspective.     .

## 2024-06-04 ENCOUNTER — HOSPITAL ENCOUNTER (OUTPATIENT)
Facility: HOSPITAL | Age: 79
Discharge: HOME | End: 2024-06-04
Attending: OPHTHALMOLOGY | Admitting: OPHTHALMOLOGY
Payer: MEDICARE

## 2024-06-04 ENCOUNTER — ANESTHESIA (OUTPATIENT)
Dept: SURGERY | Facility: HOSPITAL | Age: 79
End: 2024-06-04
Payer: MEDICARE

## 2024-06-04 DIAGNOSIS — H25.12 AGE-RELATED NUCLEAR CATARACT, LEFT EYE: ICD-10-CM

## 2024-06-04 PROBLEM — H25.012 CORTICAL AGE-RELATED CATARACT, LEFT EYE: Status: RESOLVED | Noted: 2024-05-24 | Resolved: 2024-06-04

## 2024-06-04 LAB — POCT GLUCOSE: 109 MG/DL (ref 70–110)

## 2024-06-04 PROCEDURE — 71000015 HC POSTOP RECOV 1ST HR: Performed by: OPHTHALMOLOGY

## 2024-06-04 PROCEDURE — 63600175 PHARM REV CODE 636 W HCPCS: Performed by: OPHTHALMOLOGY

## 2024-06-04 PROCEDURE — 37000008 HC ANESTHESIA 1ST 15 MINUTES: Performed by: OPHTHALMOLOGY

## 2024-06-04 PROCEDURE — 36000707: Performed by: OPHTHALMOLOGY

## 2024-06-04 PROCEDURE — V2632 POST CHMBR INTRAOCULAR LENS: HCPCS | Performed by: OPHTHALMOLOGY

## 2024-06-04 PROCEDURE — 25000242 PHARM REV CODE 250 ALT 637 W/ HCPCS: Performed by: ANESTHESIOLOGY

## 2024-06-04 PROCEDURE — 25000003 PHARM REV CODE 250: Performed by: OPHTHALMOLOGY

## 2024-06-04 PROCEDURE — 37000009 HC ANESTHESIA EA ADD 15 MINS: Performed by: OPHTHALMOLOGY

## 2024-06-04 PROCEDURE — 36000706: Performed by: OPHTHALMOLOGY

## 2024-06-04 RX ORDER — PROPARACAINE HYDROCHLORIDE 5 MG/ML
SOLUTION/ DROPS OPHTHALMIC
Status: DISCONTINUED | OUTPATIENT
Start: 2024-06-04 | End: 2024-06-04 | Stop reason: HOSPADM

## 2024-06-04 RX ORDER — LIDOCAINE HYDROCHLORIDE 10 MG/ML
1 INJECTION, SOLUTION EPIDURAL; INFILTRATION; INTRACAUDAL; PERINEURAL ONCE
Status: DISCONTINUED | OUTPATIENT
Start: 2024-06-04 | End: 2024-06-04 | Stop reason: HOSPADM

## 2024-06-04 RX ORDER — MOXIFLOXACIN 5 MG/ML
1 SOLUTION/ DROPS OPHTHALMIC
Status: COMPLETED | OUTPATIENT
Start: 2024-06-04 | End: 2024-06-04

## 2024-06-04 RX ORDER — PREDNISOLONE ACETATE 10 MG/ML
SUSPENSION/ DROPS OPHTHALMIC
Status: DISCONTINUED | OUTPATIENT
Start: 2024-06-04 | End: 2024-06-04 | Stop reason: HOSPADM

## 2024-06-04 RX ORDER — LIDOCAINE HYDROCHLORIDE 10 MG/ML
0.5 INJECTION, SOLUTION EPIDURAL; INFILTRATION; INTRACAUDAL; PERINEURAL ONCE
Status: DISCONTINUED | OUTPATIENT
Start: 2024-06-04 | End: 2024-06-04 | Stop reason: HOSPADM

## 2024-06-04 RX ORDER — MIDAZOLAM HYDROCHLORIDE 2 MG/ML
4 SYRUP ORAL ONCE
Status: COMPLETED | OUTPATIENT
Start: 2024-06-04 | End: 2024-06-04

## 2024-06-04 RX ORDER — LIDOCAINE HYDROCHLORIDE 10 MG/ML
INJECTION, SOLUTION EPIDURAL; INFILTRATION; INTRACAUDAL; PERINEURAL
Status: DISCONTINUED | OUTPATIENT
Start: 2024-06-04 | End: 2024-06-04 | Stop reason: HOSPADM

## 2024-06-04 RX ORDER — MOXIFLOXACIN 5 MG/ML
SOLUTION/ DROPS OPHTHALMIC
Status: DISCONTINUED | OUTPATIENT
Start: 2024-06-04 | End: 2024-06-04 | Stop reason: HOSPADM

## 2024-06-04 RX ORDER — PHENYLEPH/TROPICAMIDE IN WATER 2.5 %-1 %
1 DROPS OPHTHALMIC (EYE)
Status: COMPLETED | OUTPATIENT
Start: 2024-06-04 | End: 2024-06-04

## 2024-06-04 RX ORDER — PROPARACAINE HYDROCHLORIDE 5 MG/ML
1 SOLUTION/ DROPS OPHTHALMIC
Status: COMPLETED | OUTPATIENT
Start: 2024-06-04 | End: 2024-06-04

## 2024-06-04 RX ADMIN — MOXIFLOXACIN 1 DROP: 5 SOLUTION/ DROPS OPHTHALMIC at 07:06

## 2024-06-04 RX ADMIN — Medication 1 DROP: at 07:06

## 2024-06-04 RX ADMIN — PROPARACAINE HYDROCHLORIDE 1 DROP: 5 SOLUTION/ DROPS OPHTHALMIC at 07:06

## 2024-06-04 RX ADMIN — MIDAZOLAM HYDROCHLORIDE 4 MG: 2 SYRUP ORAL at 08:06

## 2024-06-04 NOTE — ANESTHESIA POSTPROCEDURE EVALUATION
Anesthesia Post Evaluation    Patient: Tara Parker    Procedure(s) Performed: Procedure(s) (LRB):  PHACOEMULSIFICATION, CATARACT, WITH IOL INSERTION (Left)    Final Anesthesia Type: MAC      Patient location during evaluation: OPS  Patient participation: Yes- Able to Participate  Level of consciousness: awake  Post-procedure vital signs: reviewed and stable  Pain management: adequate  Airway patency: patent    PONV status at discharge: No PONV  Anesthetic complications: no      Cardiovascular status: blood pressure returned to baseline  Respiratory status: spontaneous ventilation  Hydration status: euvolemic  Follow-up not needed.              Vitals Value Taken Time   /90 06/04/24 0912   Temp 37 C 06/04/24 0912   Pulse 64 06/04/24 0912   Resp 20 06/04/24 0912   SpO2 100 % 06/04/24 0912         No case tracking events are documented in the log.      Pain/Arnel Score: Arnel Score: 10 (6/4/2024  9:22 AM)

## 2024-06-04 NOTE — TRANSFER OF CARE
Anesthesia Transfer of Care Note    Patient: Tara Parker    Procedure(s) Performed: Procedure(s) (LRB):  PHACOEMULSIFICATION, CATARACT, WITH IOL INSERTION (Left)    Patient location: Other: OR    Anesthesia Type: MAC    Transport from OR: Transported from OR on room air with adequate spontaneous ventilation    Post pain: adequate analgesia    Post assessment: no apparent anesthetic complications    Post vital signs: stable    Level of consciousness: awake    Nausea/Vomiting: no nausea/vomiting    Complications: none    Transfer of care protocol was followed      Last vitals:   187/84  100% O2 SAT  16 RR  37 C TEMP  65 HR

## 2024-06-04 NOTE — OP NOTE
[facility]  Operative Note    Date:  2024    Patient:  Tara Parker  MRN:  [id number]   :  1945    Surgeon:  Jose Daniel Villarreal MD    PREOPERATIVE DIAGNOSIS:  Visually significant age-related nuclear and cortical cataract, left eye.    POSTOPERATIVE DIAGNOSIS:  Visually significant age-related nuclear and cortical cataract, left eye.    PROCEDURE:  Phacoemulsification of cataract with posterior chamber intraocular lens implant, left eye.    ANESTHESIA:  Topical with MAC.      COMPLICATIONS:  None.    ESTIMATED BLOOD LOSS:  Minimal.    PROCEDURE IN DETAIL:  The patient presented to our clinic complaining of increasing difficulties with activities of daily living due to a visually significant cataract in the left eye.  After risks, benefits, and alternatives were explained to the patient, the patient agreed to proceed with the above procedure.  The patient was brought to the operating room and received topical anesthetic to the left eye and was then prepped and draped in the usual sterile fashion.  The left eye was first entered at 5:00 o'clock paracentesis site followed by intracameral lidocaine, and Viscoat.  The primary surgical site was then created at 2:30 o'clock followed by continuous capsulotomy, hydrodissection, and phacoemulsification of the cataract.  Residual cortical material was removed using the automated irrigation-aspiration technique.  Provisc was injected into the posterior chamber and an Ty model [id number] 16.5 diopter lens was placed in the bag without difficulty.  Residual Provisc was removed using the automated irrigation-aspiration technique.  The eye was re-pressurized using BSS solution, and both the paracentesis and primary surgical site were demonstrated to be watertight at the end of the case with Weck-Bettina manipulation.  Vigamox, Pred Forte, and Prolensa were placed in the eye followed by placement of a Gomez shield.  The patient tolerated the procedure well and was taken to  the recovery room in good and stable condition.  The patient was instructed to refrain from any heavy lifting, bending, stooping, or straining activities and to follow-up in the morning for routine post-operative care with Dr. Jose Daniel Villarreal.

## 2024-06-04 NOTE — DISCHARGE SUMMARY
[facility]  Brief Discharge Note    Patient Name:  Tara Parker   MRN:  [id number]    :  1945   Admission Date:  2024   Discharge Date:  2024   Attending Physician: Jose Daniel Villarreal MD     Procedure:  PHACOEMULSIFICATION, CATARACT (Left)    OUTCOME: Patient tolerated procedure well without complication and is now ready for discharge.    DISPOSITION: Home or Self Care    FINAL DIAGNOSIS:  Age-related nuclear cataract, left eye                                     Age-related cortical cataract, left eye    FOLLOWUP: 1 day in clinic    DISCHARGE INSTRUCTIONS:  Wear eye shield until your schedule appointment with doctor.                                                       Only remove eye shield to apply eye drops.                                                       Follow the post-op eye instructions given from the clinic.

## 2024-06-04 NOTE — DISCHARGE INSTRUCTIONS
-RELAX AT HOME FOR THE REST OF THE DAY. YOU MAY EAT ANYTHING YOU LIKE.   -PLAN TO SEE DR ZARAGOZA TOMORROW AT THE OFFICE. BRING ALL EYE DROPS WITH YOU TO THIS APPOINTMENT.    -PUT EYE DROPS IN THE AFFECTED EYE AS PER DR ZARAGOZA'S EYE DROP SCHEDULED. -USE IN ANY ORDER, WAIT 5 MINUTES BETWEEN DROPS.  -REMOVE EYE SHIELD WHILE PUTTING EYE DROPS IN.    -DO NOT RUB YOUR EYE!!  -DO NOT EXERT YOURSELF - NO HEAVY LIFTING, RUNNING OR SWIMMING FOR 1 WEEK.  -YOU MAY SHOWER, BUT DON'T ALLOW WATER INTO YOUR EYE FOR 1 WEEK.  -WEAR PROTECTIVE SUNGLASSES DURING THE DAY AND AN EYE SHIELD AT NIGHT FOR 1 WEEK.    NO DRINKING ALCOHOL OR DRIVING FOR 24 HOURS.    -IF YOU HAVE PAIN, REDNESS OR DECREASED VISION, CALL DR ZARAGOZA'S OFFICE [phone] OR IF AFTER HOURS CALL [phone].  No driving

## 2024-06-06 VITALS
RESPIRATION RATE: 20 BRPM | SYSTOLIC BLOOD PRESSURE: 195 MMHG | DIASTOLIC BLOOD PRESSURE: 90 MMHG | TEMPERATURE: 98 F | OXYGEN SATURATION: 100 % | HEART RATE: 64 BPM

## 2024-06-26 ENCOUNTER — TELEPHONE (OUTPATIENT)
Dept: FAMILY MEDICINE | Facility: CLINIC | Age: 79
End: 2024-06-26
Payer: MEDICARE

## 2024-06-28 ENCOUNTER — TELEPHONE (OUTPATIENT)
Dept: FAMILY MEDICINE | Facility: CLINIC | Age: 79
End: 2024-06-28

## 2024-06-28 ENCOUNTER — OFFICE VISIT (OUTPATIENT)
Dept: FAMILY MEDICINE | Facility: CLINIC | Age: 79
End: 2024-06-28
Payer: MEDICARE

## 2024-06-28 VITALS
HEART RATE: 72 BPM | SYSTOLIC BLOOD PRESSURE: 130 MMHG | BODY MASS INDEX: 26.29 KG/M2 | WEIGHT: 154 LBS | DIASTOLIC BLOOD PRESSURE: 80 MMHG | OXYGEN SATURATION: 98 % | RESPIRATION RATE: 18 BRPM | HEIGHT: 64 IN

## 2024-06-28 DIAGNOSIS — I10 ESSENTIAL HYPERTENSION: ICD-10-CM

## 2024-06-28 DIAGNOSIS — Z79.4 TYPE 2 DIABETES MELLITUS WITH DIABETIC PERIPHERAL ANGIOPATHY WITHOUT GANGRENE, WITH LONG-TERM CURRENT USE OF INSULIN: Primary | ICD-10-CM

## 2024-06-28 DIAGNOSIS — E11.51 TYPE 2 DIABETES MELLITUS WITH DIABETIC PERIPHERAL ANGIOPATHY WITHOUT GANGRENE, WITH LONG-TERM CURRENT USE OF INSULIN: Primary | ICD-10-CM

## 2024-06-28 DIAGNOSIS — M15.9 PRIMARY OSTEOARTHRITIS INVOLVING MULTIPLE JOINTS: ICD-10-CM

## 2024-06-28 DIAGNOSIS — G30.1 MODERATE LATE ONSET ALZHEIMER'S DEMENTIA WITHOUT BEHAVIORAL DISTURBANCE, PSYCHOTIC DISTURBANCE, MOOD DISTURBANCE, OR ANXIETY: ICD-10-CM

## 2024-06-28 DIAGNOSIS — F02.B0 MODERATE LATE ONSET ALZHEIMER'S DEMENTIA WITHOUT BEHAVIORAL DISTURBANCE, PSYCHOTIC DISTURBANCE, MOOD DISTURBANCE, OR ANXIETY: ICD-10-CM

## 2024-06-28 RX ORDER — EMPAGLIFLOZIN 25 MG/1
TABLET, FILM COATED ORAL
COMMUNITY
Start: 2024-06-24

## 2024-06-28 RX ORDER — PREDNISOLONE ACETATE 10 MG/ML
1 SUSPENSION/ DROPS OPHTHALMIC 4 TIMES DAILY
COMMUNITY
Start: 2024-05-31 | End: 2024-06-28

## 2024-06-28 RX ORDER — MOXIFLOXACIN 5 MG/ML
1 SOLUTION/ DROPS OPHTHALMIC 4 TIMES DAILY
COMMUNITY
Start: 2024-05-31

## 2024-06-28 RX ORDER — KETOROLAC TROMETHAMINE 5 MG/ML
1 SOLUTION OPHTHALMIC 4 TIMES DAILY
COMMUNITY
Start: 2024-06-03

## 2024-06-28 RX ORDER — MELOXICAM 7.5 MG/1
7.5 TABLET ORAL DAILY
Qty: 90 TABLET | Refills: 0 | Status: SHIPPED | OUTPATIENT
Start: 2024-06-28 | End: 2024-09-26

## 2024-06-28 NOTE — TELEPHONE ENCOUNTER
Son was requesting a refill on the Meloxicam 7.5 - stated it helped mom with her side pain  Qty : 15

## 2024-06-28 NOTE — PROGRESS NOTES
Patient ID: [id number]     Chief Complaint: Referral      HPI:     Tara Parker is a 78 y.o. female here today with her son-and niece on telephone for acute visit :   Patient has recently had cataract surgery-doing well.  Family does want home health in order to help with medicine management.  1) Type 2 DM: Patient was evaluated by endocrinologist-started on Jardiance 25 mg-no noticeable side effects.  Blood sugars are continuing to be monitored by Justyna.   2) Hypertension: Patient has been taking medicine daily. No symptoms associated with increased BP such as headache/ visual changes/ dizziness/ chest pain.   3) Dementia:  No acute changes since last visit.  4) Osteoarthritis: Patient is continuing to take anti-inflammatory for osteoarthritis has needed-improvement in joint pain and movement.  No noticeable side effects.  Patient is aware of symptoms to screen for.      Past Medical History:   Diagnosis Date    Anxiety and depression 04/08/2023    Cataract 05/2024    Essential hypertension 04/08/2023    Mixed hyperlipidemia 04/08/2023    Osteopenia of multiple sites 04/08/2023    PVD (peripheral vascular disease)     Type 2 diabetes mellitus with diabetic peripheral angiopathy without gangrene, with long-term current use of insulin 04/08/2023        Past Surgical History:   Procedure Laterality Date    GI obstruction N/A     HYSTERECTOMY      PHACOEMULSIFICATION, CATARACT, WITH IOL INSERTION Left 6/4/2024    Procedure: PHACOEMULSIFICATION, CATARACT, WITH IOL INSERTION;  Surgeon: Jose Daniel Villarreal MD;  Location: [facility];  Service: Ophthalmology;  Laterality: Left;  3rd 0700        Social History     Tobacco Use    Smoking status: Never    Smokeless tobacco: Never   Substance Use Topics    Alcohol use: Never    Drug use: Never        Social History     Socioeconomic History    Marital status:     Number of children: 3        Current Outpatient Medications   Medication Instructions    amLODIPine (NORVASC) 5  "mg, Oral, Daily    aspirin (ECOTRIN) 81 mg, Oral, Daily    blood-glucose sensor (FREESTYLE NISHA 3 SENSOR) Suzanne 1 Device, Misc.(Non-Drug; Combo Route), Every 14 days    cloNIDine (CATAPRES) 0.1 mg, Oral, As needed (PRN)    JARDIANCE 25 mg tablet 1 tablet Orally Once a day for 30 days    ketorolac 0.5% (ACULAR) 0.5 % Drop 1 drop, Left Eye, 4 times daily    LANTUS SOLOSTAR U-100 INSULIN 40 Units, Subcutaneous, Daily    meloxicam (MOBIC) 7.5 mg, Oral, Daily    moxifloxacin (VIGAMOX) 0.5 % ophthalmic solution 1 drop, Left Eye, 4 times daily    olmesartan (BENICAR) 40 mg, Oral, Daily    prednisoLONE acetate (PRED FORTE) 1 % DrpS 1 drop, 4 times daily    rosuvastatin (CRESTOR) 10 mg, Oral, Daily    sertraline (ZOLOFT) 50 mg, Oral, Daily       Review of patient's allergies indicates:  No Known Allergies     Patient Care Team:  Lilliana De La Torre MD as PCP - General (Family Medicine)  Juan David Zapata MD as Consulting Physician (Neurology)  Roscoe Boyce MD as Consulting Physician (Cardiology)  Wendy Hussein RD as Diabetes Educator (Diabetes)       Subjective:     Review of Systems    12 point review of systems conducted, negative except as stated in the history of present illness. See HPI for details.      Objective:     Visit Vitals  /80 (BP Location: Left arm, Patient Position: Sitting)   Pulse 72   Resp 18   Ht 5' 4" (1.626 m)   Wt 69.9 kg (154 lb)   SpO2 98%   BMI 26.43 kg/m²       Physical Exam    General: Alert and oriented, No acute distress.  Head: Normocephalic, Atraumatic.  Eye:  Patient is wearing dark glasses after cataract surgery in order to avoid light exposure.  EOM, sclerae is clear  Ears/Nose/Throat: Normal, Mucosa moist,Clear.  Neck/Thyroid: Supple, Non-tender  Respiratory: Clear to auscultation bilaterally  Cardiovascular: S1/S2   Gastrointestinal: Soft, Non-tender, Non-distended, Normal bowel sounds, No palpable organomegaly.  Musculoskeletal: Normal range of motion.  Integumentary: Warm, " Dry  Extremities: No clubbing, cyanosis or edema  Neurologic: No focal deficits, Cranial Nerves II-XII are grossly intact  Psychiatric: Normal interaction, Coherent speech       Assessment:       ICD-10-CM ICD-9-CM   1. Type 2 diabetes mellitus with diabetic peripheral angiopathy without gangrene, with long-term current use of insulin  E11.51 250.70    Z79.4 443.81     V58.67   2. Moderate late onset Alzheimer's dementia without behavioral disturbance, psychotic disturbance, mood disturbance, or anxiety  G30.1 331.0    F02.B0 294.10   3. Essential hypertension  I10 401.9   4. Primary osteoarthritis involving multiple joints  M15.9 715.98        Plan:     1. Type 2 diabetes mellitus with diabetic peripheral angiopathy without gangrene, with long-term current use of insulin  Patient is comanage with  endocrinologist-no acute modifications or recommendations at this time-continue with current treatment plan.      2. Moderate late onset Alzheimer's dementia without behavioral disturbance, psychotic disturbance, mood disturbance, or anxiety  Patient is currently stable.  No acute modifications recommended.  Continue with treatment plan per neurologist.    - Ambulatory referral/consult to Home Health; Future    3. Essential hypertension  Patient has been taking medication. Blood pressure goal of less than 130/80-blood pressure is stable.    4. Primary osteoarthritis involving multiple joints  Patient is taking medication for osteoarthritis.    Pathophysiology/treatment/dangers discussed at length.    Patient feels that the benefits of the medication outweighed the risk.    No noticeable side effects of medicine at this time.    - meloxicam (MOBIC) 7.5 MG tablet; Take 1 tablet (7.5 mg total) by mouth once daily.  Dispense: 90 tablet; Refill: 0    Follow up if symptoms worsen or fail to improve. In addition to their scheduled follow up, the patient has also been instructed to follow up on as needed basis.     Future  Appointments   Date Time Provider Department Center   7/29/2024  1:00 PM PRE-ADMIT, [facility] [facility]   8/29/2024 10:30 AM Lilliana De La Torre MD [facility] [place]   10/14/2024 10:30 AM Eddy Garcia, [facility] [address] Aleks De La Torre MD

## 2024-07-13 NOTE — PROGRESS NOTES
Patient ID: [id number]     Chief Complaint: Abdominal Pain      HPI:     Tara Parker is a 78 y.o. female here today with her son/niece on the phone for acute visit:   1) Patient has been having problems with abdominal pain for the last 1 month-was evaluated in the emergency room-discharged home on meloxicam-meloxicam seems to help the pain but then the pain reoccurs-intermittent in intensity and frequency.  Has not been associated with diet, nausea, vomiting, chest pain, shortness of breath-or changes in appetite.  No associated systemic symptoms such as fever or chills.  2) Niece is noticing increased problems with dementia-would like to discuss treatment options for dementia.    Past Medical History:   Diagnosis Date    Anxiety and depression 04/08/2023    Essential hypertension 04/08/2023    Mixed hyperlipidemia 04/08/2023    Osteopenia of multiple sites 04/08/2023    Type 2 diabetes mellitus with diabetic peripheral angiopathy without gangrene, with long-term current use of insulin 04/08/2023        Past Surgical History:   Procedure Laterality Date    GI obstruction N/A     HYSTERECTOMY      PHACOEMULSIFICATION, CATARACT, WITH IOL INSERTION Left 6/4/2024    Procedure: PHACOEMULSIFICATION, CATARACT, WITH IOL INSERTION;  Surgeon: Jose Daniel Villarreal MD;  Location: [facility];  Service: Ophthalmology;  Laterality: Left;  3rd 0700        Social History     Tobacco Use    Smoking status: Never    Smokeless tobacco: Never   Substance Use Topics    Alcohol use: Never    Drug use: Never        Social History     Socioeconomic History    Marital status:     Number of children: 3        Current Outpatient Medications   Medication Instructions    amLODIPine (NORVASC) 5 mg, Oral, Daily    aspirin (ECOTRIN) 81 mg, Oral, Daily    blood-glucose sensor (FREESTYLE NISHA 3 SENSOR) Suzanne 1 Device, Misc.(Non-Drug; Combo Route), Every 14 days    cloNIDine (CATAPRES) 0.1 mg, Oral, As needed (PRN)    ibuprofen (ADVIL,MOTRIN) 200  "mg, Oral, Daily PRN    JARDIANCE 25 mg tablet 1 tablet Orally Once a day for 30 days    ketorolac 0.5% (ACULAR) 0.5 % Drop 1 drop, Left Eye, 4 times daily    LANTUS SOLOSTAR U-100 INSULIN 40 Units, Subcutaneous, Daily    meloxicam (MOBIC) 7.5 mg, Oral, Daily    moxifloxacin (VIGAMOX) 0.5 % ophthalmic solution 1 drop, Left Eye, 4 times daily    olmesartan (BENICAR) 40 mg, Oral, Daily    rosuvastatin (CRESTOR) 10 mg, Oral, Daily    sertraline (ZOLOFT) 50 mg, Oral, Daily       Review of patient's allergies indicates:  No Known Allergies     Patient Care Team:  Lilliana De La Torre MD as PCP - General (Family Medicine)  Juan David Zapata MD as Consulting Physician (Neurology)  Roscoe Boyce MD as Consulting Physician (Cardiology)  Wendy Hussein RD as Diabetes Educator (Diabetes)       Subjective:     Review of Systems    12 point review of systems conducted, negative except as stated in the history of present illness. See HPI for details.      Objective:     Visit Vitals  /73 (BP Location: Left arm, Patient Position: Sitting, BP Method: Medium (Automatic))   Pulse 68   Resp 16   Ht 5' 4" (1.626 m)   Wt 69.9 kg (154 lb)   SpO2 100%   BMI 26.43 kg/m²       Physical Exam    General: Alert and oriented, No acute distress.  Head: Normocephalic, Atraumatic.  Eye: Pupils are equal, round and reactive to light, Extraocular movements are intact, Sclera non-icteric.  Ears/Nose/Throat: Normal, Mucosa moist,Clear.  Neck/Thyroid: Supple, Non-tender  Respiratory: Clear to auscultation bilaterally  Cardiovascular: Regular rate and rhythm, S1/S2 normal  Gastrointestinal: Soft, tenderness left lower quadrant-no rebound tenderness-no guarding, Non-distended, Normal bowel sounds, No palpable organomegaly.  Musculoskeletal: Normal range of motion.  Integumentary: Warm, Dry  Extremities: No clubbing, cyanosis or edema  Neurologic: No focal deficits, Cranial Nerves II-XII are grossly intact  Psychiatric: Normal interaction, Coherent speech "       Assessment:       ICD-10-CM ICD-9-CM   1. Left lower quadrant pain  R10.32 789.04   2. Moderate late onset Alzheimer's dementia without behavioral disturbance, psychotic disturbance, mood disturbance, or anxiety  G30.1 331.0    F02.B0 294.10        Plan:      1. Left lower quadrant pain  Pathophysiology/Treatment/ Dangers Discussed.  Check CT of the abdomen and pelvis management based on findings.  - CT Abdomen Pelvis W Wo Contrast; Future  - Creatinine, serum; Future    2. Moderate late onset Alzheimer's dementia without behavioral disturbance, psychotic disturbance, mood disturbance, or anxiety  Pathophysiology/Treatment/ Dangers Discussed.  Patient has been referred to a neurologist per family's request-recommendations for family to discussed with neurologist treatment options for dementia.      Follow up if symptoms worsen or fail to improve. In addition to their scheduled follow up, the patient has also been instructed to follow up on as needed basis.     Future Appointments   Date Time Provider Department Center   7/29/2024  1:00 PM PRE-ADMIT, [facility]   8/29/2024 10:30 AM [facility] [place]   10/14/2024 10:30 AM Eddy Garcia AGACNP[facility] [address] Aleks De La Torre MD

## 2024-07-18 ENCOUNTER — OFFICE VISIT (OUTPATIENT)
Dept: FAMILY MEDICINE | Facility: CLINIC | Age: 79
End: 2024-07-18
Payer: MEDICARE

## 2024-07-18 VITALS
WEIGHT: 154 LBS | DIASTOLIC BLOOD PRESSURE: 73 MMHG | HEART RATE: 68 BPM | RESPIRATION RATE: 16 BRPM | HEIGHT: 64 IN | SYSTOLIC BLOOD PRESSURE: 131 MMHG | OXYGEN SATURATION: 100 % | BODY MASS INDEX: 26.29 KG/M2

## 2024-07-18 DIAGNOSIS — R10.32 LEFT LOWER QUADRANT PAIN: Primary | ICD-10-CM

## 2024-07-18 DIAGNOSIS — G30.1 MODERATE LATE ONSET ALZHEIMER'S DEMENTIA WITHOUT BEHAVIORAL DISTURBANCE, PSYCHOTIC DISTURBANCE, MOOD DISTURBANCE, OR ANXIETY: ICD-10-CM

## 2024-07-18 DIAGNOSIS — F02.B0 MODERATE LATE ONSET ALZHEIMER'S DEMENTIA WITHOUT BEHAVIORAL DISTURBANCE, PSYCHOTIC DISTURBANCE, MOOD DISTURBANCE, OR ANXIETY: ICD-10-CM

## 2024-07-18 PROCEDURE — 1125F AMNT PAIN NOTED PAIN PRSNT: CPT | Mod: CPTII,,, | Performed by: FAMILY MEDICINE

## 2024-07-18 PROCEDURE — 2023F DILAT RTA XM W/O RTNOPTHY: CPT | Mod: CPTII,,, | Performed by: FAMILY MEDICINE

## 2024-07-18 PROCEDURE — 1159F MED LIST DOCD IN RCRD: CPT | Mod: CPTII,,, | Performed by: FAMILY MEDICINE

## 2024-07-18 PROCEDURE — 99214 OFFICE O/P EST MOD 30 MIN: CPT | Mod: ,,, | Performed by: FAMILY MEDICINE

## 2024-07-18 PROCEDURE — 1160F RVW MEDS BY RX/DR IN RCRD: CPT | Mod: CPTII,,, | Performed by: FAMILY MEDICINE

## 2024-07-18 PROCEDURE — 1101F PT FALLS ASSESS-DOCD LE1/YR: CPT | Mod: CPTII,,, | Performed by: FAMILY MEDICINE

## 2024-07-18 PROCEDURE — 3078F DIAST BP <80 MM HG: CPT | Mod: CPTII,,, | Performed by: FAMILY MEDICINE

## 2024-07-18 PROCEDURE — 3288F FALL RISK ASSESSMENT DOCD: CPT | Mod: CPTII,,, | Performed by: FAMILY MEDICINE

## 2024-07-18 PROCEDURE — 3075F SYST BP GE 130 - 139MM HG: CPT | Mod: CPTII,,, | Performed by: FAMILY MEDICINE

## 2024-07-18 RX ORDER — IBUPROFEN 200 MG
200 TABLET ORAL DAILY PRN
COMMUNITY

## 2024-08-20 ENCOUNTER — DOCUMENTATION ONLY (OUTPATIENT)
Dept: FAMILY MEDICINE | Facility: CLINIC | Age: 79
End: 2024-08-20
Payer: MEDICARE

## 2024-08-25 NOTE — PROGRESS NOTES
Patient ID: [id number]     Chief Complaint: Medicare AWV        HPI:     Tara Parker is a 78 y.o. female here today with her son for a Medicare Wellness.   No acute changes in mental status since last visit-patient continues to have good and bad days.  1) Type 2 DM: Patient has been trying to better manage her diabetes with combination of insulin and diet-has had some low blood sugars.  Modifications has been made.  2) Hypertension: Patient has been taking medicine daily. BP is normal at home. No symptoms associated with increased BP such as headache/ visual changes/ dizziness/ chest pain.   3) Hyperlipidemia: Patient is taking medication at Night. No side effects of medication noted.      Cervical Cancer Screening -  Pap guidelines discussed with the patient  Breast Cancer Screening - Mammogram orders given  Colon Cancer Screening - Colonoscopy Up to date    Osteoporosis Screening - DEXA  Up to date   Eye Exam - Recommend annually.  Dental Exam - Recommend biannually  Vaccinations -   Immunization History   Administered Date(s) Administered    COVID-19 MRNA, LN-S PF (MODERNA HALF 0.25 ML DOSE) 04/19/2022    COVID-19, MRNA, LN-S, PF (Pfizer) (Purple Cap) 01/20/2021, 02/10/2021    Influenza - High Dose - PF (65 years and older) 08/20/2024    Influenza - Quadrivalent - High Dose - PF (65 years and older) 10/06/2020, 10/03/2021, 10/31/2023    Influenza A (H1N1) 2009 Monovalent - IM 01/07/2010    Pneumococcal Conjugate - 20 Valent 02/19/2024, 02/20/2024    RSVpreF (Arexvy) 10/31/2023    Zoster Recombinant 09/18/2019, 10/17/2021        Past Medical History:   Diagnosis Date    Anxiety and depression 04/08/2023    Essential hypertension 04/08/2023    Mixed hyperlipidemia 04/08/2023    Osteopenia of multiple sites 04/08/2023    Type 2 diabetes mellitus with diabetic peripheral angiopathy without gangrene, with long-term current use of insulin 04/08/2023        Past Surgical History:   Procedure Laterality Date    GI  obstruction N/A     HYSTERECTOMY      PHACOEMULSIFICATION, CATARACT, WITH IOL INSERTION Left 6/4/2024    Procedure: PHACOEMULSIFICATION, CATARACT, WITH IOL INSERTION;  Surgeon: Jose Daniel Villarreal MD;  Location: [facility];  Service: Ophthalmology;  Laterality: Left;  3rd 0700        Social History     Tobacco Use    Smoking status: Never    Smokeless tobacco: Never   Substance Use Topics    Alcohol use: Never    Drug use: Never        Social History     Socioeconomic History    Marital status:     Number of children: 3        Current Outpatient Medications   Medication Instructions    amLODIPine (NORVASC) 5 mg, Oral, Daily    aspirin (ECOTRIN) 81 mg, Oral, Daily    blood sugar diagnostic Strp To check BG 3 times daily, to use with insurance preferred meter    blood-glucose meter kit To check BG 2 times daily, to use with insurance preferred meter    blood-glucose sensor (FREESTYLE NISHA 3 SENSOR) Suzanne 1 Device, Misc.(Non-Drug; Combo Route), Every 14 days    cloNIDine (CATAPRES) 0.1 mg, Oral, As needed (PRN)    ibuprofen (ADVIL,MOTRIN) 200 mg, Oral, Daily PRN    JARDIANCE 25 mg tablet 1 tablet Orally Once a day for 30 days    ketorolac 0.5% (ACULAR) 0.5 % Drop 1 drop, Left Eye, 4 times daily    lancets Misc To check BG 2 times daily, to use with insurance preferred meter    LANTUS SOLOSTAR U-100 INSULIN 40 Units, Subcutaneous, Daily    meloxicam (MOBIC) 7.5 mg, Oral, Daily    moxifloxacin (VIGAMOX) 0.5 % ophthalmic solution 1 drop, Left Eye, 4 times daily    olmesartan (BENICAR) 40 mg, Oral, Daily    prednisoLONE acetate (PRED FORTE) 1 % DrpS 1 drop, Left Eye, 2 times daily    rosuvastatin (CRESTOR) 10 mg, Oral, Daily    sertraline (ZOLOFT) 50 mg, Oral, Daily       Review of patient's allergies indicates:  No Known Allergies     Patient Care Team:  Lilliana De La Torre MD as PCP - General (Family Medicine)  Juan David Zapata MD as Consulting Physician (Neurology)  Roscoe Boyce MD as Consulting Physician  "(Cardiology)  Wendy Hussein RD as Diabetes Educator (Diabetes)     Subjective:     Review of Systems    12 point review of systems conducted, negative except as stated in the history of present illness. See HPI for details.    Patient Reported Health Risk Assessments:       Objective:     Visit Vitals  BP (!) 140/80 (BP Location: Right arm, Patient Position: Sitting)   Pulse 71   Ht 5' 4" (1.626 m)   Wt 74.3 kg (163 lb 12.8 oz)   SpO2 98%   BMI 28.12 kg/m²       Physical Exam    General: Alert and oriented, No acute distress.  Head: Normocephalic, Atraumatic.  Eye: Pupils are equal, round and reactive to light, Extraocular movements are intact, Sclera non-icteric.  Ears/Nose/Throat: Normal, Mucosa moist,Clear.  Neck/Thyroid: Supple, Non-tender, No carotid bruit, No palpable thyromegaly or thyroid nodule, No lymphadenopathy, No JVD, Full range of motion.  Respiratory: Clear to auscultation bilaterally; No wheezes, rales or rhonchi, Non-labored respirations, Symmetrical chest wall expansion.  Cardiovascular: S1/S2 normal  Gastrointestinal: Soft, Non-tender, Non-distended, Normal bowel sounds, No palpable organomegaly.  Musculoskeletal: Normal range of motion.  Integumentary: Warm, Dry, Intact, No suspicious lesions or rashes.  Extremities: No clubbing, cyanosis or edema  Protective Sensation (w/ 10 gram monofilament):  Right: Intact  Left: Intact    Visual Inspection:  Normal -  Bilateral    Pedal Pulses:   Right: Present  Left: Present    Posterior Tibialis Pulses:   Right:Present  Left: Present     Neurologic: No focal deficits, Cranial Nerves II-XII are grossly intact, Motor strength normal upper and lower extremities, Sensory exam intact.  Psychiatric: Normal interaction, Coherent speech, Euthymic mood, Appropriate affect       Assessment:       ICD-10-CM ICD-9-CM   1. Wellness examination  Z00.00 V70.0   2. Type 2 diabetes mellitus with diabetic peripheral angiopathy without gangrene, with long-term current use " of insulin  E11.51 250.70    Z79.4 443.81     V58.67   3. Essential hypertension  I10 401.9   4. Mixed hyperlipidemia  E78.2 272.2   5. Anxiety and depression  F41.9 300.00    F32.A 311   6. Moderate late onset Alzheimer's dementia without behavioral disturbance, psychotic disturbance, mood disturbance, or anxiety  G30.1 331.0    F02.B0 294.10   7. Osteopenia of multiple sites  M85.89 733.90   8. Chronic kidney disease, stage 3a  N18.31 585.3   9. Statin myopathy  G72.0 359.4    T46.6X5A E942.2   10. Encounter for screening mammogram for malignant neoplasm of breast  Z12.31 V76.12        Plan:     1. Wellness examination  Patient presented to office today for their Medicare Annual Wellness Visit.    Education was provided on health nutrition, including a diet nayan in fruits and vegetables, minimizing simple carbohydrates, salt, and saturated fats.  Encouraged regular cardiovascular exercise such as walking at lease 30 minutes daily, 5 times per week.  Emphasized preventive health measures and educated pt on fall prevention and community-based lifestule interventions to help reduce health risks and promote healthy living.     2. Type 2 diabetes mellitus with diabetic peripheral angiopathy without gangrene, with long-term current use of insulin  Lab Results   Component Value Date    HGBA1C 7.6 (H) 08/29/2024    HGBA1C 7.6 (H) 05/13/2024    HGBA1C 10.0 04/04/2023    .00 (H) 08/29/2024    CREATININE 1.15 (H) 08/29/2024    CREATININE 0.99 08/28/2023      Pathophysiology/treatment/dangers discussed.   Patient to continue medication-Jardiance 25 mg-Lantus.  Blood sugar goal of less than 120 fasting and 140-150 about 2 hours after meal.   Current HA1C is 7.6. Hemoglobin A1c needs to be rechecked in 6 months. Goal less than 6.5.  Follow ADA Diet. Avoid soda, simple sweets, and limit rice/pasta/breads/starches (no more than 45-50 grams per meal).  Maintain healthy weight with goal BMI <30.  Exercise 5 times per week  for 30 minutes per day.  Stressed importance of daily foot exams.Stressed importance of annual dilated eye exam.   - blood-glucose meter kit; To check BG 2 times daily, to use with insurance preferred meter  Dispense: 1 each; Refill: 0  - lancets Misc; To check BG 2 times daily, to use with insurance preferred meter  Dispense: 200 each; Refill: 2  - blood sugar diagnostic Strp; To check BG 3 times daily, to use with insurance preferred meter  Dispense: 200 strip; Refill: 3  - Hemoglobin A1C; Future  - Microalbumin/Creatinine Ratio, Urine; Future  - Urinalysis, Reflex to Urine Culture; Future    3. Essential hypertension  Patient has been taking medication-Benicar 40mg. Blood pressure goal of less than 130/80-blood pressure is stable. Continue to encourage diet and activity modifications. Including stress management. Pathophysiology/treatment/dangers discussed.    - CBC Auto Differential; Future  - Comprehensive Metabolic Panel; Future    4. Mixed hyperlipidemia  Lab Results   Component Value Date    CHOL 282 (H) 08/29/2024    CHOL 260 (H) 08/28/2023    .00 (H) 08/29/2024    TRIG 72 08/29/2024    TRIG 129 08/28/2023    HDL 70 (H) 08/29/2024    HDL 54 08/28/2023     Pathophysiology/treatment/dangers discussed. Patient to continue diet modification- Crestor 10mg.   Total cholesterol 282 ( Goal less than 200) HDL 70 ( Goal high as possible)  ( Goal less than 70) Triglycerides 72 ( Goal less than 150)   - Lipid Panel; Future  - TSH; Future    5. Anxiety and depression  Patient is doing well on medication Zoloft  - continue to encourage lifestyle modifications including 20-30 minutes exercise daily.    6. Moderate late onset Alzheimer's dementia without behavioral disturbance, psychotic disturbance, mood disturbance, or anxiety  Patient is currently stable.  No acute modifications recommended.  Continue with current treatment.    7. Osteopenia of multiple sites  Patient is currently stable.  No acute  modifications recommended.  Continue with lifestyle modifications-including adequate calcium vitamin-D supplementation-strength training exercises.    8. Chronic kidney disease, stage 3a  Pathophysiology/Treatment/ Dangers Discussed.  Maximize treatment of risk factors including type 2 diabetes, hypertension, and hydration.    9. Statin myopathy  Treatment options for elevated cholesterol discussed at length with the patient and her son-they will recheck with pharmacy regarding medications she has taken in the past-to see if there is any she would like to try.    10. Encounter for screening mammogram for malignant neoplasm of breast  Per patient's request orders placed for mammogram    Fall Risk + Home Safety + Living Situation + Whisper Test + Depression Screen + CAGE + Cognitive Impairment Screen + ADL Screen + Timed Get Up and Go + Nutrition Screen + PAQ Screen + Health Risk Assessment all reviewed.          No data to display                  8/29/2024    10:30 AM 7/18/2024    10:45 AM 6/28/2024     9:45 AM 5/20/2024    11:45 AM 5/8/2024     1:00 PM 4/9/2024     1:00 PM 4/8/2024    12:00 PM   Fall Risk Assessment - Outpatient   Mobility Status Ambulatory Ambulatory Ambulatory Ambulatory Ambulatory Ambulatory Ambulatory   Number of falls 0 0 0 0 0 0 0   Identified as fall risk False False False False False False False                             CVD Risk Factors - Reviewed  Obesity/Physical Activity - Normal BMI. Encouraged daily 30 minute physical activity x 5 days per week.    Opioid Screening: Patient medication list reviewed, patient is not taking prescription opioids. Patient is not using additional opioids than prescribed. Patient is at low risk of substance abuse based on this opioid use history.     Advance Care Planning     Date: 08/25/2024    Living Will  Advanced Care Planning: I offered to discuss Advanced Care Planning, which consists of how you would like to be cared for as you end the near of your  natural life.  This includes how to pick a person who would make decisions for you if you were unable to make them for yourself, which is called a Medical Power of . These discussions include what kind of decisions you will make regarding life sustaining measures, such as ventilators and feeding tubes, when faced with a life limiting illness recorded on a living will that they will need to know.                 The patient's Health Maintenance was reviewed and the following appears to be due at this time:   Health Maintenance Due   Topic Date Due    TETANUS VACCINE  Never done         Follow up in about 6 months (around 2/28/2025) for Diabetes. In addition to their scheduled follow up, the patient has also been instructed to follow up on as needed basis.     Future Appointments   Date Time Provider Department Center   9/5/2024 12:15 PM [facility] [id number] DX2 [facility] JAM [place]   9/5/2024  1:00 PM [facility] US2 [facility] US [place]   10/14/2024 10:30 AM Eddy Garcia AGACNP[facility] [address] [place]   2/28/2025 10:00 AM [facility]        Provided patient with a 5-10 year written screening schedule and personal prevention plan. Recommendations were developed using the USPSTF age appropriate recommendations. Education, counseling, and referrals were provided as needed. After Visit Summary printed and given to patient which includes a list of additional screenings\tests needed.    Lilliana De La Torre MD

## 2024-08-29 ENCOUNTER — LAB VISIT (OUTPATIENT)
Dept: LAB | Facility: HOSPITAL | Age: 79
End: 2024-08-29
Attending: FAMILY MEDICINE
Payer: MEDICARE

## 2024-08-29 ENCOUNTER — OFFICE VISIT (OUTPATIENT)
Dept: FAMILY MEDICINE | Facility: CLINIC | Age: 79
End: 2024-08-29
Payer: MEDICARE

## 2024-08-29 VITALS
WEIGHT: 163.81 LBS | HEART RATE: 71 BPM | DIASTOLIC BLOOD PRESSURE: 80 MMHG | HEIGHT: 64 IN | SYSTOLIC BLOOD PRESSURE: 140 MMHG | BODY MASS INDEX: 27.96 KG/M2 | OXYGEN SATURATION: 98 %

## 2024-08-29 DIAGNOSIS — E11.51 TYPE 2 DIABETES MELLITUS WITH DIABETIC PERIPHERAL ANGIOPATHY WITHOUT GANGRENE, WITH LONG-TERM CURRENT USE OF INSULIN: ICD-10-CM

## 2024-08-29 DIAGNOSIS — Z12.31 ENCOUNTER FOR SCREENING MAMMOGRAM FOR MALIGNANT NEOPLASM OF BREAST: ICD-10-CM

## 2024-08-29 DIAGNOSIS — E78.2 MIXED HYPERLIPIDEMIA: ICD-10-CM

## 2024-08-29 DIAGNOSIS — F41.9 ANXIETY AND DEPRESSION: ICD-10-CM

## 2024-08-29 DIAGNOSIS — M85.89 OSTEOPENIA OF MULTIPLE SITES: ICD-10-CM

## 2024-08-29 DIAGNOSIS — I10 ESSENTIAL HYPERTENSION: ICD-10-CM

## 2024-08-29 DIAGNOSIS — Z79.4 TYPE 2 DIABETES MELLITUS WITH DIABETIC PERIPHERAL ANGIOPATHY WITHOUT GANGRENE, WITH LONG-TERM CURRENT USE OF INSULIN: ICD-10-CM

## 2024-08-29 DIAGNOSIS — Z00.00 WELLNESS EXAMINATION: Primary | ICD-10-CM

## 2024-08-29 DIAGNOSIS — N18.31 CHRONIC KIDNEY DISEASE, STAGE 3A: ICD-10-CM

## 2024-08-29 DIAGNOSIS — G30.1 MODERATE LATE ONSET ALZHEIMER'S DEMENTIA WITHOUT BEHAVIORAL DISTURBANCE, PSYCHOTIC DISTURBANCE, MOOD DISTURBANCE, OR ANXIETY: ICD-10-CM

## 2024-08-29 DIAGNOSIS — F32.A ANXIETY AND DEPRESSION: ICD-10-CM

## 2024-08-29 DIAGNOSIS — F02.B0 MODERATE LATE ONSET ALZHEIMER'S DEMENTIA WITHOUT BEHAVIORAL DISTURBANCE, PSYCHOTIC DISTURBANCE, MOOD DISTURBANCE, OR ANXIETY: ICD-10-CM

## 2024-08-29 DIAGNOSIS — T46.6X5A STATIN MYOPATHY: ICD-10-CM

## 2024-08-29 DIAGNOSIS — G72.0 STATIN MYOPATHY: ICD-10-CM

## 2024-08-29 PROBLEM — H25.011 CORTICAL AGE-RELATED CATARACT, RIGHT EYE: Status: ACTIVE | Noted: 2024-08-29

## 2024-08-29 PROBLEM — H25.11 AGE-RELATED NUCLEAR CATARACT, RIGHT EYE: Status: ACTIVE | Noted: 2024-08-29

## 2024-08-29 LAB
ALBUMIN SERPL-MCNC: 3.7 G/DL (ref 3.4–4.8)
ALBUMIN/GLOB SERPL: 1.1 RATIO (ref 1.1–2)
ALP SERPL-CCNC: 100 UNIT/L (ref 40–150)
ALT SERPL-CCNC: 13 UNIT/L (ref 0–55)
ANION GAP SERPL CALC-SCNC: 6 MEQ/L
AST SERPL-CCNC: 22 UNIT/L (ref 5–34)
BACTERIA #/AREA URNS AUTO: ABNORMAL /HPF
BASOPHILS # BLD AUTO: 0.03 X10(3)/MCL
BASOPHILS NFR BLD AUTO: 0.6 %
BILIRUB SERPL-MCNC: 0.3 MG/DL
BILIRUB UR QL STRIP.AUTO: NEGATIVE
BUN SERPL-MCNC: 29.3 MG/DL (ref 9.8–20.1)
CALCIUM SERPL-MCNC: 9.7 MG/DL (ref 8.4–10.2)
CHLORIDE SERPL-SCNC: 108 MMOL/L (ref 98–107)
CHOLEST SERPL-MCNC: 282 MG/DL
CHOLEST/HDLC SERPL: 4 {RATIO} (ref 0–5)
CLARITY UR: CLEAR
CO2 SERPL-SCNC: 25 MMOL/L (ref 23–31)
COLOR UR AUTO: ABNORMAL
CREAT SERPL-MCNC: 1.15 MG/DL (ref 0.55–1.02)
CREAT UR-MCNC: 84 MG/DL (ref 45–106)
CREAT/UREA NIT SERPL: 25
EOSINOPHIL # BLD AUTO: 0.07 X10(3)/MCL (ref 0–0.9)
EOSINOPHIL NFR BLD AUTO: 1.3 %
ERYTHROCYTE [DISTWIDTH] IN BLOOD BY AUTOMATED COUNT: 16.4 % (ref 11.5–17)
EST. AVERAGE GLUCOSE BLD GHB EST-MCNC: 171.4 MG/DL
GFR SERPLBLD CREATININE-BSD FMLA CKD-EPI: 49 ML/MIN/1.73/M2
GLOBULIN SER-MCNC: 3.3 GM/DL (ref 2.4–3.5)
GLUCOSE SERPL-MCNC: 141 MG/DL (ref 82–115)
GLUCOSE UR QL STRIP: ABNORMAL
HBA1C MFR BLD: 7.6 %
HCT VFR BLD AUTO: 35.4 % (ref 37–47)
HDLC SERPL-MCNC: 70 MG/DL (ref 35–60)
HGB BLD-MCNC: 11.1 G/DL (ref 12–16)
HGB UR QL STRIP: NEGATIVE
IMM GRANULOCYTES # BLD AUTO: 0.02 X10(3)/MCL (ref 0–0.04)
IMM GRANULOCYTES NFR BLD AUTO: 0.4 %
KETONES UR QL STRIP: NEGATIVE
LDLC SERPL CALC-MCNC: 198 MG/DL (ref 50–140)
LEUKOCYTE ESTERASE UR QL STRIP: 75
LYMPHOCYTES # BLD AUTO: 1.96 X10(3)/MCL (ref 0.6–4.6)
LYMPHOCYTES NFR BLD AUTO: 36.2 %
MCH RBC QN AUTO: 26.8 PG (ref 27–31)
MCHC RBC AUTO-ENTMCNC: 31.4 G/DL (ref 33–36)
MCV RBC AUTO: 85.5 FL (ref 80–94)
MICROALBUMIN UR-MCNC: 497 UG/ML
MICROALBUMIN/CREAT RATIO PNL UR: 591.7 MG/GM CR (ref 0–30)
MONOCYTES # BLD AUTO: 0.61 X10(3)/MCL (ref 0.1–1.3)
MONOCYTES NFR BLD AUTO: 11.3 %
NEUTROPHILS # BLD AUTO: 2.72 X10(3)/MCL (ref 2.1–9.2)
NEUTROPHILS NFR BLD AUTO: 50.2 %
NITRITE UR QL STRIP: NEGATIVE
NRBC BLD AUTO-RTO: 0 %
PH UR STRIP: 5.5 [PH]
PLATELET # BLD AUTO: 233 X10(3)/MCL (ref 130–400)
PMV BLD AUTO: 10.4 FL (ref 7.4–10.4)
POTASSIUM SERPL-SCNC: 4.9 MMOL/L (ref 3.5–5.1)
PROT SERPL-MCNC: 7 GM/DL (ref 5.8–7.6)
PROT UR QL STRIP: ABNORMAL
RBC # BLD AUTO: 4.14 X10(6)/MCL (ref 4.2–5.4)
RBC #/AREA URNS AUTO: ABNORMAL /HPF
SODIUM SERPL-SCNC: 139 MMOL/L (ref 136–145)
SP GR UR STRIP.AUTO: 1.02 (ref 1–1.03)
SQUAMOUS #/AREA URNS LPF: ABNORMAL /HPF
TRIGL SERPL-MCNC: 72 MG/DL (ref 37–140)
TSH SERPL-ACNC: 4.29 UIU/ML (ref 0.35–4.94)
UROBILINOGEN UR STRIP-ACNC: NORMAL
VLDLC SERPL CALC-MCNC: 14 MG/DL
WBC # BLD AUTO: 5.41 X10(3)/MCL (ref 4.5–11.5)
WBC #/AREA URNS AUTO: ABNORMAL /HPF

## 2024-08-29 PROCEDURE — 81001 URINALYSIS AUTO W/SCOPE: CPT

## 2024-08-29 PROCEDURE — 80053 COMPREHEN METABOLIC PANEL: CPT

## 2024-08-29 PROCEDURE — 83036 HEMOGLOBIN GLYCOSYLATED A1C: CPT

## 2024-08-29 PROCEDURE — 82570 ASSAY OF URINE CREATININE: CPT

## 2024-08-29 PROCEDURE — 84443 ASSAY THYROID STIM HORMONE: CPT

## 2024-08-29 PROCEDURE — 36415 COLL VENOUS BLD VENIPUNCTURE: CPT

## 2024-08-29 PROCEDURE — 80061 LIPID PANEL: CPT

## 2024-08-29 PROCEDURE — 85025 COMPLETE CBC W/AUTO DIFF WBC: CPT

## 2024-08-29 RX ORDER — GLIMEPIRIDE 2 MG/1
2 TABLET ORAL
COMMUNITY
Start: 2024-07-25 | End: 2024-08-29

## 2024-08-29 RX ORDER — LANCETS
EACH MISCELLANEOUS
Qty: 200 EACH | Refills: 2 | Status: SHIPPED | OUTPATIENT
Start: 2024-08-29

## 2024-08-29 RX ORDER — INSULIN PUMP SYRINGE, 3 ML
EACH MISCELLANEOUS
Qty: 1 EACH | Refills: 0 | Status: SHIPPED | OUTPATIENT
Start: 2024-08-29 | End: 2025-08-29

## 2024-08-29 RX ORDER — INSULIN PUMP SYRINGE, 3 ML
EACH MISCELLANEOUS
Qty: 1 EACH | Refills: 0 | Status: SHIPPED | OUTPATIENT
Start: 2024-08-29 | End: 2024-08-29

## 2024-08-29 RX ORDER — PREDNISOLONE ACETATE 10 MG/ML
1 SUSPENSION/ DROPS OPHTHALMIC 2 TIMES DAILY
COMMUNITY
Start: 2024-07-29

## 2024-08-29 RX ORDER — LANCETS
EACH MISCELLANEOUS
Qty: 200 EACH | Refills: 2 | Status: SHIPPED | OUTPATIENT
Start: 2024-08-29 | End: 2024-08-29

## 2024-08-30 ENCOUNTER — ANESTHESIA EVENT (OUTPATIENT)
Dept: SURGERY | Facility: HOSPITAL | Age: 79
End: 2024-08-30
Payer: MEDICARE

## 2024-08-30 NOTE — ANESTHESIA PREPROCEDURE EVALUATION
08/30/2024  Tara Parker is a 78 y.o., female.      Pre-op Assessment    I have reviewed the Patient Summary Reports.    I have reviewed the NPO Status.   I have reviewed the Medications.     Review of Systems  Anesthesia Hx:  No problems with previous Anesthesia             Denies Family Hx of Anesthesia complications.    Denies Personal Hx of Anesthesia complications.                    Social:  Non-Smoker       Cardiovascular:     Hypertension, well controlled                                        Pulmonary:  Pulmonary Normal                       Renal/:  Chronic Renal Disease, CKD                Hepatic/GI:  Hepatic/GI Normal                 Neurological:  Neurology Normal                                      Endocrine:  Diabetes, well controlled, type 2           Psych:  Psychiatric History anxiety depression                Physical Exam  General: Well nourished    Airway:  Mallampati: II / II  Mouth Opening: Normal  TM Distance: Normal  Tongue: Normal  Neck ROM: Normal ROM    Dental:  Partial Dentures, Dentures    Chest/Lungs:  Clear to auscultation    Heart:  Rate: Normal  Rhythm: Regular Rhythm  Sounds: Normal        Anesthesia Plan  Type of Anesthesia, risks & benefits discussed:    Anesthesia Type: MAC  Intra-op Monitoring Plan: Standard ASA Monitors  Post Op Pain Control Plan: multimodal analgesia  Induction:  IV  Airway Plan: Direct  Informed Consent: Informed consent signed with the Patient and all parties understand the risks and agree with anesthesia plan.  All questions answered.   ASA Score: 4  Day of Surgery Review of History & Physical: I have interviewed and examined the patient. I have reviewed the patient's H&P dated: There are no significant changes.     Ready For Surgery From Anesthesia Perspective.     .

## 2024-09-03 ENCOUNTER — ANESTHESIA (OUTPATIENT)
Dept: SURGERY | Facility: HOSPITAL | Age: 79
End: 2024-09-03
Payer: MEDICARE

## 2024-09-03 ENCOUNTER — HOSPITAL ENCOUNTER (OUTPATIENT)
Facility: HOSPITAL | Age: 79
Discharge: HOME | End: 2024-09-03
Attending: OPHTHALMOLOGY | Admitting: OPHTHALMOLOGY
Payer: MEDICARE

## 2024-09-03 VITALS
SYSTOLIC BLOOD PRESSURE: 154 MMHG | WEIGHT: 163 LBS | DIASTOLIC BLOOD PRESSURE: 87 MMHG | HEIGHT: 64 IN | BODY MASS INDEX: 27.83 KG/M2 | TEMPERATURE: 99 F | RESPIRATION RATE: 18 BRPM | OXYGEN SATURATION: 100 % | HEART RATE: 70 BPM

## 2024-09-03 DIAGNOSIS — H25.11 AGE-RELATED NUCLEAR CATARACT, RIGHT EYE: ICD-10-CM

## 2024-09-03 PROBLEM — H25.011 CORTICAL AGE-RELATED CATARACT, RIGHT EYE: Status: RESOLVED | Noted: 2024-08-29 | Resolved: 2024-09-03

## 2024-09-03 LAB — POCT GLUCOSE: 145 MG/DL (ref 70–110)

## 2024-09-03 PROCEDURE — 25000003 PHARM REV CODE 250: Performed by: OPHTHALMOLOGY

## 2024-09-03 PROCEDURE — 37000008 HC ANESTHESIA 1ST 15 MINUTES: Performed by: OPHTHALMOLOGY

## 2024-09-03 PROCEDURE — 25000242 PHARM REV CODE 250 ALT 637 W/ HCPCS: Performed by: ANESTHESIOLOGY

## 2024-09-03 PROCEDURE — V2632 POST CHMBR INTRAOCULAR LENS: HCPCS | Performed by: OPHTHALMOLOGY

## 2024-09-03 PROCEDURE — 71000015 HC POSTOP RECOV 1ST HR: Performed by: OPHTHALMOLOGY

## 2024-09-03 PROCEDURE — 63600175 PHARM REV CODE 636 W HCPCS: Performed by: OPHTHALMOLOGY

## 2024-09-03 PROCEDURE — 36000706: Performed by: OPHTHALMOLOGY

## 2024-09-03 PROCEDURE — 37000009 HC ANESTHESIA EA ADD 15 MINS: Performed by: OPHTHALMOLOGY

## 2024-09-03 PROCEDURE — 36000707: Performed by: OPHTHALMOLOGY

## 2024-09-03 RX ORDER — TROPICAMIDE 10 MG/ML
1 SOLUTION/ DROPS OPHTHALMIC
Status: COMPLETED | OUTPATIENT
Start: 2024-09-03 | End: 2024-09-03

## 2024-09-03 RX ORDER — LIDOCAINE HYDROCHLORIDE 10 MG/ML
1 INJECTION, SOLUTION EPIDURAL; INFILTRATION; INTRACAUDAL; PERINEURAL ONCE
Status: DISCONTINUED | OUTPATIENT
Start: 2024-09-03 | End: 2024-09-03 | Stop reason: HOSPADM

## 2024-09-03 RX ORDER — PREDNISOLONE ACETATE 10 MG/ML
SUSPENSION/ DROPS OPHTHALMIC
Status: DISCONTINUED | OUTPATIENT
Start: 2024-09-03 | End: 2024-09-03 | Stop reason: HOSPADM

## 2024-09-03 RX ORDER — PHENYLEPHRINE HYDROCHLORIDE 25 MG/ML
1 SOLUTION/ DROPS OPHTHALMIC
Status: COMPLETED | OUTPATIENT
Start: 2024-09-03 | End: 2024-09-03

## 2024-09-03 RX ORDER — PROPARACAINE HYDROCHLORIDE 5 MG/ML
SOLUTION/ DROPS OPHTHALMIC
Status: DISCONTINUED | OUTPATIENT
Start: 2024-09-03 | End: 2024-09-03 | Stop reason: HOSPADM

## 2024-09-03 RX ORDER — LIDOCAINE HYDROCHLORIDE 10 MG/ML
0.5 INJECTION, SOLUTION EPIDURAL; INFILTRATION; INTRACAUDAL; PERINEURAL ONCE
Status: DISCONTINUED | OUTPATIENT
Start: 2024-09-03 | End: 2024-09-03 | Stop reason: HOSPADM

## 2024-09-03 RX ORDER — BROMFENAC SODIUM 0.7 MG/ML
SOLUTION/ DROPS OPHTHALMIC
Status: DISCONTINUED | OUTPATIENT
Start: 2024-09-03 | End: 2024-09-03 | Stop reason: HOSPADM

## 2024-09-03 RX ORDER — LIDOCAINE HYDROCHLORIDE 10 MG/ML
INJECTION, SOLUTION EPIDURAL; INFILTRATION; INTRACAUDAL; PERINEURAL
Status: DISCONTINUED | OUTPATIENT
Start: 2024-09-03 | End: 2024-09-03 | Stop reason: HOSPADM

## 2024-09-03 RX ORDER — MOXIFLOXACIN 5 MG/ML
1 SOLUTION/ DROPS OPHTHALMIC
Status: COMPLETED | OUTPATIENT
Start: 2024-09-03 | End: 2024-09-03

## 2024-09-03 RX ORDER — MIDAZOLAM HYDROCHLORIDE 2 MG/ML
2 SYRUP ORAL ONCE
Status: COMPLETED | OUTPATIENT
Start: 2024-09-03 | End: 2024-09-03

## 2024-09-03 RX ORDER — MOXIFLOXACIN 5 MG/ML
SOLUTION/ DROPS OPHTHALMIC
Status: DISCONTINUED | OUTPATIENT
Start: 2024-09-03 | End: 2024-09-03 | Stop reason: HOSPADM

## 2024-09-03 RX ORDER — PROPARACAINE HYDROCHLORIDE 5 MG/ML
1 SOLUTION/ DROPS OPHTHALMIC
Status: COMPLETED | OUTPATIENT
Start: 2024-09-03 | End: 2024-09-03

## 2024-09-03 RX ADMIN — PROPARACAINE HYDROCHLORIDE 1 DROP: 5 SOLUTION/ DROPS OPHTHALMIC at 06:09

## 2024-09-03 RX ADMIN — PHENYLEPHRINE HYDROCHLORIDE 1 DROP: 25 SOLUTION/ DROPS OPHTHALMIC at 06:09

## 2024-09-03 RX ADMIN — MOXIFLOXACIN 1 DROP: 5 SOLUTION/ DROPS OPHTHALMIC at 07:09

## 2024-09-03 RX ADMIN — TROPICAMIDE 1 DROP: 10 SOLUTION/ DROPS OPHTHALMIC at 06:09

## 2024-09-03 RX ADMIN — MIDAZOLAM HYDROCHLORIDE 2 MG: 2 SYRUP ORAL at 08:09

## 2024-09-03 NOTE — ANESTHESIA POSTPROCEDURE EVALUATION
Anesthesia Post Evaluation    Patient: Tara Parker    Procedure(s) Performed: Procedure(s) (LRB):  PHACOEMULSIFICATION, CATARACT, WITH IOL INSERTION (Right)    Final Anesthesia Type: MAC      Patient location during evaluation: OPS  Patient participation: Yes- Able to Participate  Level of consciousness: awake  Post-procedure vital signs: reviewed and stable  Pain management: adequate  Airway patency: patent    PONV status at discharge: No PONV  Anesthetic complications: no      Cardiovascular status: blood pressure returned to baseline  Respiratory status: spontaneous ventilation  Hydration status: euvolemic  Follow-up not needed.              Vitals Value Taken Time   /87 09/03/24 0906   Temp 37 °C (98.6 °F) 09/03/24 0906   Pulse 70 09/03/24 0906   Resp 18 09/03/24 0906   SpO2 100 % 09/03/24 0906         No case tracking events are documented in the log.      Pain/Arnel Score: Arnel Score: 9 (9/3/2024  9:07 AM)

## 2024-09-03 NOTE — DISCHARGE SUMMARY
[facility]  Brief Discharge Note    Patient Name:  Tara Parker   MRN:  [id number]    :  1945   Admission Date:  9/3/2024    Discharge Date:  9/3/2024   Attending Physician: Jose Daniel Villarreal MD     Procedure:  PHACOEMULSIFICATION, CATARACT (Right)    OUTCOME: Patient tolerated procedure well without complication and is now ready for discharge.    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                                                       Only remove eye shield to apply eye drops.                                                       Follow the post-op eye instructions given from the clinic.

## 2024-09-03 NOTE — OP NOTE
[facility]  Operative Note    Date:  9/3/2024     Patient:  Tara Parker   MRN:  [id number]   :  1945    Surgeon:  Jose Daniel Villarreal MD    PREOPERATIVE DIAGNOSIS:  Visually significant age-related nuclear and cortical cataract, right eye.    POSTOPERATIVE DIAGNOSIS:  Visually significant age-related nuclear and cortical cataract, right eye.    PROCEDURE:  Phacoemulsification of cataract with posterior chamber intraocular lens implant, right eye.    ANESTHESIA:  Topical with MAC.      COMPLICATIONS:  None.    ESTIMATED BLOOD LOSS:  Minimal.    PROCEDURE IN DETAIL:  The patient presented to our clinic complaining of increasing difficulties with activities of daily living due to a visually significant cataract in the right eye.  After risks, benefits, and alternatives were explained to the patient, the patient agreed to proceed with the above procedure.  The patient was brought to the operating room and received topical anesthetic to the right eye and was then prepped and draped in the usual sterile fashion.  The right eye was first entered at 11:00 o'clock paracentesis site followed by intracameral lidocaine, and Viscoat.  The primary surgical site was then created at 8:30 o'clock followed by continuous capsulotomy, hydrodissection, and phacoemulsification of the cataract.  Residual cortical material was removed using the automated irrigation-aspiration technique.  Provisc was injected into the posterior chamber and an Ty model SY60WF 15.5 diopter lens was placed in the bag without difficulty.  Residual Provisc was removed using the automated irrigation-aspiration technique.  The eye was re-pressurized using BSS solution, and both the paracentesis and primary surgical site were demonstrated to be watertight at the end of the case with Weck-Bettina manipulation.  Vigamox, Pred Forte, and Prolensa were placed in the eye followed by placement of a Gomez shield.  The patient tolerated the procedure well and was  taken to the recovery room in good and stable condition.  The patient was instructed to refrain from any heavy lifting, bending, stooping, or straining activities and to follow-up in the morning for routine post-operative care with Dr. Jose Daniel Villarreal.

## 2024-09-03 NOTE — ADDENDUM NOTE
Addendum  created 09/03/24 0942 by Gregory Elizondo MD    Attestation recorded in Intraprocedure, Intraprocedure Attestations filed

## 2024-09-03 NOTE — TRANSFER OF CARE
Anesthesia Transfer of Care Note    Patient: Tara Parker    Procedure(s) Performed: Procedure(s) (LRB):  PHACOEMULSIFICATION, CATARACT, WITH IOL INSERTION (Right)    Patient location: Other: OR    Anesthesia Type: MAC    Transport from OR: Transported from OR on room air with adequate spontaneous ventilation    Post pain: adequate analgesia    Post assessment: no apparent anesthetic complications    Post vital signs: stable    Level of consciousness: awake    Nausea/Vomiting: no nausea/vomiting    Complications: none    Transfer of care protocol was followed      Last vitals:   100% O2 SAT  37 C TEMP  67 HR  99% O2 SAT  16 RR

## 2024-09-03 NOTE — DISCHARGE INSTRUCTIONS
-RELAX AT HOME FOR THE REST OF THE DAY. YOU MAY EAT ANYTHING YOU LIKE.   -PLAN TO SEE DR ZARAGOZA TOMORROW AT THE OFFICE. BRING ALL EYE DROPS WITH YOU TO THIS APPOINTMENT.    -PUT EYE DROPS IN THE AFFECTED EYE AS PER DR ZARAGOZA'S EYE DROP SCHEDULED. -USE IN ANY ORDER, WAIT 5 MINUTES BETWEEN DROPS.  -REMOVE EYE SHIELD WHILE PUTTING EYE DROPS IN.    -DO NOT RUB YOUR EYE!!  -DO NOT EXERT YOURSELF - NO HEAVY LIFTING, RUNNING OR SWIMMING FOR 1 WEEK.  -YOU MAY SHOWER, BUT DON'T ALLOW WATER INTO YOUR EYE FOR 1 WEEK.  -WEAR PROTECTIVE SUNGLASSES DURING THE DAY AND AN EYE SHIELD AT NIGHT FOR 1 WEEK.    NO DRINKING ALCOHOL OR DRIVING FOR 24 HOURS.    -IF YOU HAVE PAIN, REDNESS OR DECREASED VISION, CALL DR ZARAGOZA'S OFFICE -[phone] OR IF AFTER HOURS CALL [phone].

## 2024-09-05 ENCOUNTER — HOSPITAL ENCOUNTER (OUTPATIENT)
Dept: RADIOLOGY | Facility: HOSPITAL | Age: 79
Discharge: HOME | End: 2024-09-05
Attending: FAMILY MEDICINE
Payer: MEDICARE

## 2024-09-05 DIAGNOSIS — R92.8 ABNORMAL MAMMOGRAM: ICD-10-CM

## 2024-09-05 PROCEDURE — 77061 BREAST TOMOSYNTHESIS UNI: CPT | Mod: TC,LT

## 2024-09-05 PROCEDURE — 76641 ULTRASOUND BREAST COMPLETE: CPT | Mod: TC,LT

## 2024-09-30 ENCOUNTER — HOSPITAL ENCOUNTER (OUTPATIENT)
Dept: RADIOLOGY | Facility: HOSPITAL | Age: 79
Discharge: HOME | End: 2024-09-30
Attending: FAMILY MEDICINE
Payer: MEDICARE

## 2024-09-30 DIAGNOSIS — R92.8 ABNORMAL MAMMOGRAM: ICD-10-CM

## 2024-09-30 PROCEDURE — 88361 TUMOR IMMUNOHISTOCHEM/COMPUT: CPT

## 2024-09-30 PROCEDURE — 77065 DX MAMMO INCL CAD UNI: CPT | Mod: TC,LT

## 2024-09-30 PROCEDURE — 77061 BREAST TOMOSYNTHESIS UNI: CPT | Mod: TC,LT

## 2024-09-30 PROCEDURE — 77061 BREAST TOMOSYNTHESIS UNI: CPT | Mod: 26,LT,, | Performed by: RADIOLOGY

## 2024-09-30 PROCEDURE — 27000550 US BREAST BIOPSY WITH IMAGING 1ST SITE LEFT

## 2024-09-30 PROCEDURE — 88305 TISSUE EXAM BY PATHOLOGIST: CPT | Performed by: FAMILY MEDICINE

## 2024-09-30 PROCEDURE — 77065 DX MAMMO INCL CAD UNI: CPT | Mod: 26,LT,, | Performed by: RADIOLOGY

## 2024-10-02 DIAGNOSIS — C50.912 INVASIVE DUCTAL CARCINOMA OF BREAST, FEMALE, LEFT: Primary | ICD-10-CM

## 2024-10-03 LAB — PSYCHE PATHOLOGY RESULT: NORMAL

## 2024-10-04 NOTE — H&P (VIEW-ONLY)
[facility] Breast Surg  Breast Surgical Oncology  New Patient Office Visit - H&P      Referring Provider:  Dr. Lilliana De La Torre     PCP: Networked reference to record PCT      Care Team: Data Unavailable     Chief Complaint:   Chief Complaint   Patient presents with    Breast Cancer     Patient reports no breast related concerns         Subjective:      History of Present Illness:  Tara Parker is a pleasant 79 y.o.female who presents as a referral from Lilliana De La Torre MD for a new diagnosis of left breast cancer.  This was picked up on screening mammogram.  In the left breast at 5:00, 5 cm from the nipple she has a 0.8 cm mass.  Pathology revealed IDC with mucinous features, grade 1, ER 94%, IL 93%, HER2 2+, fish negative.  She has never had any breast surgeries or biopsies prior to this and she denies noting any changes with her breast.  Her family history is significant for mom with breast cancer at 76, and a maternal aunt with ovarian cancer.  She is 1 of 15 siblings.  She is present at the appointment today with 1 of her sons, Shaka.  She is currently living with him, but she has her own house and attends to all of her daily living activities.  Shaka states that she has early signs of dementia.      Imaging:   Bilateral screening mammogram 08/29/2024:  Density B.  BI-RADS 0.  Mass in the left breast at 6:00 a.m., posterior depth.  Asymmetry in the left breast lateral region middle depth on CC view.  No suspicious findings in the right breast.  Left diagnostic mammogram 09/05/2024:  Density B.  BI-RADS 5.  Central inferior left breast, posterior depth there is a persistent 0.8 cm mass with spiculated margins.  On ultrasound this measures 0.8 x 0.3 x 0.5 cm.  In the 4:00 left breast, 2 cm from the nipple there is a 0.4 cm benign complicated cyst.    Ultrasound of the left axilla demonstrates no significant left axillary adenopathy.  Ultrasound-guided biopsy left breast 09/30/2024:  Mass  at 5:00, 5 cm from the nipple: Successful.  Coil clip placed.  Good position.    I have reviewed the imaging and agree with the radiologists interpretation. I have discussed these results with the patient.    Pathology:   Left breast 5:00, 5 cm from nipple mass core biopsy 09/30/2024: Invasive ductal carcinoma with mucinous features, well differentiated, grade 1, ER 93%, DE 93%, HER2 2+, fish negative.    OB / GYN History   Menarche Onset:?  Menopause: Menopause: postmenopausal  Hormonal birth control (duration): 0years  Pregnancies: 3  Age at first child birth: ?  Child births: 3  Hysterectomy/Oophorectomy: hysterectomy without BSO  HRT: no    Family History of Cancer (& age at diagnosis):  -   Family History   Problem Relation Name Age of Onset    Coronary artery disease Mother Ferdinand Bazan. 85    Breast cancer Mother Ferdinand Bazan.     Cancer Mother Ferdinand Bazan.     Diabetes Mother Ferdinand Bazan.     Arthritis Mother Ferdinand Bazan.     Lung disease Father  77        Chronic Obstructive Lung Disease    Depression Son Shaka gudino jr         Lifestyle:  Height and Weight:   BMI: Body mass index is 29.18 kg/m².     Other:  MG breast density: B  Prior thoracic RT: none  Genetic testing: No  Ashkenazi Pentec[employer]al descent: No    Other History:     Past Medical History:   Diagnosis Date    Anxiety and depression 04/08/2023    Dementia     Essential hypertension 04/08/2023    Mixed hyperlipidemia 04/08/2023    Osteopenia of multiple sites 04/08/2023    Type 2 diabetes mellitus with diabetic peripheral angiopathy without gangrene, with long-term current use of insulin 04/08/2023        Past Surgical History:   Procedure Laterality Date    EYE SURGERY  09-    GI obstruction N/A     HYSTERECTOMY      PHACOEMULSIFICATION, CATARACT, WITH IOL INSERTION Left 06/04/2024    Procedure: PHACOEMULSIFICATION, CATARACT, WITH IOL INSERTION;  Surgeon: Jose Daniel Villarreal MD;  Location: [facility] OR;  Service: Ophthalmology;  Laterality:  Left;  3rd 0700    PHACOEMULSIFICATION, CATARACT, WITH IOL INSERTION Right 09/03/2024    Procedure: PHACOEMULSIFICATION, CATARACT, WITH IOL INSERTION;  Surgeon: Jose Daniel Villarreal MD;  Location: [facility];  Service: Ophthalmology;  Laterality: Right;  2nd 0630    TUBAL LIGATION          Social History     Socioeconomic History    Marital status:     Number of children: 3   Occupational History    Occupation: Retired: [employer]   Tobacco Use    Smoking status: Never    Smokeless tobacco: Never   Substance and Sexual Activity    Alcohol use: Never    Drug use: Never    Sexual activity: Not Currently     Partners: Male     Birth control/protection: None     Social Drivers of Health     Financial Resource Strain: Low Risk  (5/8/2024)    Overall Financial Resource Strain (CARDIA)     Difficulty of Paying Living Expenses: Not hard at all   Food Insecurity: No Food Insecurity (5/8/2024)    Hunger Vital Sign     Worried About Running Out of Food in the Last Year: Never true     Ran Out of Food in the Last Year: Never true   Transportation Needs: No Transportation Needs (5/8/2024)    TRANSPORTATION NEEDS     Transportation : No   Physical Activity: Sufficiently Active (5/8/2024)    Exercise Vital Sign     Days of Exercise per Week: 7 days     Minutes of Exercise per Session: 60 min   Stress: No Stress Concern Present (5/8/2024)    [employer] [employer] - Occupational Stress Questionnaire     Feeling of Stress : Not at all   Housing Stability: Low Risk  (5/8/2024)    Housing Stability Vital Sign     Unable to Pay for Housing in the Last Year: No     Homeless in the Last Year: No        Immunization History   Administered Date(s) Administered    COVID-19 MRNA, LN-S PF (MODERNA HALF 0.25 ML DOSE) 04/19/2022    COVID-19, MRNA, LN-S, PF (Pfizer) (Purple Cap) 01/20/2021, 02/10/2021    Influenza - Quadrivalent - High Dose - PF (65 years and older) 10/06/2020, 10/03/2021, 10/31/2023    Influenza - Trivalent -  Fluzone High Dose - PF (65 years and older) 08/20/2024    Influenza A (H1N1) 2009 Monovalent - IM 01/07/2010    Pneumococcal Conjugate - 20 Valent 02/19/2024, 02/20/2024    RSVpreF (Arexvy) 10/31/2023    Zoster Recombinant 09/18/2019, 10/17/2021        Medications/Allergies:       Current Outpatient Medications   Medication Instructions    amLODIPine (NORVASC) 5 mg, Oral, Daily    aspirin (ECOTRIN) 81 mg, Oral, Daily    blood sugar diagnostic Strp To check BG 3 times daily, to use with insurance preferred meter    blood-glucose meter kit To check BG 3 times daily, to use with insurance preferred meter    blood-glucose sensor (FREESTYLE NISHA 3 SENSOR) Suzanne 1 Device, Misc.(Non-Drug; Combo Route), Every 14 days    cloNIDine (CATAPRES) 0.1 mg, Oral, As needed (PRN)    ibuprofen (ADVIL,MOTRIN) 200 mg, Daily PRN    JARDIANCE 25 mg tablet 1 tablet Orally Once a day for 30 days    ketorolac 0.5% (ACULAR) 0.5 % Drop 1 drop, 4 times daily    lancets Misc To check BG 3 times daily, to use with insurance preferred meter    LANTUS SOLOSTAR U-100 INSULIN 100 unit/mL (3 mL) InPn pen INJECT 10 UNITS SUBCUTANEOUSLY ONCE DAILY    moxifloxacin (VIGAMOX) 0.5 % ophthalmic solution 1 drop, 4 times daily    olmesartan (BENICAR) 40 mg, Oral, Daily    prednisoLONE acetate (PRED FORTE) 1 % DrpS 1 drop, 2 times daily    rosuvastatin (CRESTOR) 10 mg, Oral, Daily    sertraline (ZOLOFT) 50 mg, Oral, Daily       Review of patient's allergies indicates:  No Known Allergies     Review of Systems:      ROS    Constitutional: denies fevers, chills, weight loss  HEENT: denies blurry/double vision, changes in hearing, odynophagia, dysphagia  Respiratory: denies cough, shortness of breath  Cardiovascular: denies palpitations, swelling of the extremities  GI: denies abdominal pain, nausea/vomiting, hematochezia, frequent stools  : denies frequency, dysuria, flank pain, hematuria  Skin: denies new rashes  Neurological: denies muscular/sensory  "deficiencies, loss of coordination, headaches, positive memory changes  Endo: denies hot flashes, heat/cold intolerance, lumps in the neck area  Heme: denies easy bruising and fatigue  Psychological: denies anxious/depressive moods  Musculoskeletal: denies bony pain, muscle cramps, swollen joints       Objective/Physical Exam   Visit Vitals  /77 (BP Location: Right arm, Patient Position: Sitting)   Pulse 72   Temp 98.3 °F (36.8 °C) (Oral)   Resp 18   Ht 5' 4" (1.626 m)   Wt 77.1 kg (170 lb)   SpO2 98%   BMI 29.18 kg/m²        Physical Exam     General: The patient is awake, alert and oriented. The patient is well nourished. No acute distress.  Neck: The neck is supple. The thyroid is not enlarged.  Musculoskeletal: The patient has a normal range of motion of her bilateral upper extremities.  Heart: Regular rate and rhythm, no murmurs.   Lung: No increased work of breathing. Clear breath sounds bilaterally.  Lymph nodes: There is no axillary, supraclavicular or cervical lymphadenopathy.  Breast:  Right:   On inspection there is no skin dimpling, rashes, retraction, erythema or skin abnormalities. The nipple areolar complex is normal with an everted nipple. The breasts move normally with movement of the pectoralis muscle. On palpation there are no dominant masses. There is no tenderness. There is no nipple discharge.  Left:   On inspection there is ecchymosis in the inferior breast at 5-6 o'clock near the inframammary fold.  There is no no skin dimpling, rashes, retraction. The nipple areolar complex is normal with an everted nipple. The breasts move normally with movement of the pectoralis muscle. On palpation there is a palpable hematoma at the site of biopsy (5-6 o'clock 5CFN) that is about 2 cm in size. There is no tenderness. There is no nipple discharge.         Assessment and Plan     1. Invasive ductal carcinoma of breast, female, left  - Ambulatory referral/consult to Breast Surgery    Tara Parker is a " 79 y.o.female who presents with a new diagnosis of left breast cancer. She has a 0.8cm tumor at 5, 5CFN. Pathology revealed IDC with mucinous features, G1, Er 94%, IL 93%, Her2 negative by FISH.  She is clinically and radiographically node negative.  The tumor currently has a decent size hematoma around it. No further imaging needed.      We discussed the need to proceed with local and systemic treatment to remove her cancer. As it currently stands, the patient is a candidate for either breast conserving surgery (partial mastectomy/lumpectomy) and sentinel lymph node biopsy followed by radiation or mastectomy (with or without immediate or delayed reconstruction) and sentinel lymph node biopsy.      We discussed lumpectomy/partial mastectomy and mastectomy. The rationale for lumpectomy and the need to obtain clear margins was specifically addressed.  If the tumor was found only on imaging, it will need localization to allow accurate removal.  The necessity of adding radiation following was explained. The logistics of radiation were discussed and the patient will further discuss details of radiation with her Radiation Oncologist.     The general operative procedure of mastectomy was discussed.  Occasionally radiation is recommended after mastectomy depending on tumor size, skin involvement, lymph node involvement, and final pathology.      We discussed sentinel lymph node mapping and its rationale. We also discussed the [facility] choosing wisely guidelines which state that in women 70+ with small tumors and good tumor biology, SLNB can be safely omitted.  However, it is still considered standard of care to do SLNB.     I informed her of the complications of surgery in general which include surgical site infections, hematoma or seroma requiring operation, pain, swelling, necrosis of nipple-areola and/or mastectomy flaps, positive margins, unplanned re-operations, breast deformity, lymphedema, paresthesias, and delay in  adjuvant treatments and other risks related to anesthesia.      We also briefly discussed the rationale for adjuvant systemic treatment in the form of either chemotherapy and/or hormonal therapy. This would depend on the presence of hormone receptors in the tumor. Further recommendations regarding chemotherapy will be finalized by Medical Oncology.      Plan:  After discussion and shared decision making with the patient we have formulated the following treatment plan:  1. Breast Surgery: partial mastectomy with localization left  2. Lymph node surgery:  We did discuss omission of SLNB based on O choosing wisely guidelines and she is currently undecided  3. Further work up:  Cardiac clearance  4. Further consultation: Medical and Radiation Oncology after surgery   5. Neoadjuvant hormonal/chemotherapy: no    The patient understands that it is possible this plan could change depending on further work up, consultations and review at our multidisciplinary tumor board. Our care team consisting of our breast surgeons, nurse navigators, PAs, our other specialists and other support staff will be following her closely as she moves closer to her surgery to ensure she is comfortable with the plan and we may have her return for further discussion if needed.    Allie Mena MD  Breast Surgical Oncology       I spent a total of 60 minutes on the day of the visit.  This includes face to face time and non-face to face time preparing to see the patient (eg, review of tests), obtaining and/or reviewing separately obtained history, documenting clinical information in the electronic or other health record, independently interpreting results and communicating results to the patient/family/caregiver, or care coordinator.

## 2024-10-04 NOTE — PROGRESS NOTES
[facility] Breast Surg  Breast Surgical Oncology  New Patient Office Visit - H&P      Referring Provider:  Dr. Lilliana De La Torre     PCP: Networked reference to record PCT      Care Team: Data Unavailable     Chief Complaint:   Chief Complaint   Patient presents with    Breast Cancer     Patient reports no breast related concerns         Subjective:      History of Present Illness:  Tara Parker is a pleasant 79 y.o.female who presents as a referral from Lilliana De La Torre MD for a new diagnosis of left breast cancer.  This was picked up on screening mammogram.  In the left breast at 5:00, 5 cm from the nipple she has a 0.8 cm mass.  Pathology revealed IDC with mucinous features, grade 1, ER 94%, KS 93%, HER2 2+, fish negative.  She has never had any breast surgeries or biopsies prior to this and she denies noting any changes with her breast.  Her family history is significant for mom with breast cancer at 76, and a maternal aunt with ovarian cancer.  She is 1 of 15 siblings.  She is present at the appointment today with 1 of her sons, Shaka.  She is currently living with him, but she has her own house and attends to all of her daily living activities.  Shaka states that she has early signs of dementia.      Imaging:   Bilateral screening mammogram 08/29/2024:  Density B.  BI-RADS 0.  Mass in the left breast at 6:00 a.m., posterior depth.  Asymmetry in the left breast lateral region middle depth on CC view.  No suspicious findings in the right breast.  Left diagnostic mammogram 09/05/2024:  Density B.  BI-RADS 5.  Central inferior left breast, posterior depth there is a persistent 0.8 cm mass with spiculated margins.  On ultrasound this measures 0.8 x 0.3 x 0.5 cm.  In the 4:00 left breast, 2 cm from the nipple there is a 0.4 cm benign complicated cyst.    Ultrasound of the left axilla demonstrates no significant left axillary adenopathy.  Ultrasound-guided biopsy left breast 09/30/2024:  Mass  at 5:00, 5 cm from the nipple: Successful.  Coil clip placed.  Good position.    I have reviewed the imaging and agree with the radiologists interpretation. I have discussed these results with the patient.    Pathology:   Left breast 5:00, 5 cm from nipple mass core biopsy 09/30/2024: Invasive ductal carcinoma with mucinous features, well differentiated, grade 1, ER 93%, TX 93%, HER2 2+, fish negative.    OB / GYN History   Menarche Onset:?  Menopause: Menopause: postmenopausal  Hormonal birth control (duration): 0years  Pregnancies: 3  Age at first child birth: ?  Child births: 3  Hysterectomy/Oophorectomy: hysterectomy without BSO  HRT: no    Family History of Cancer (& age at diagnosis):  -   Family History   Problem Relation Name Age of Onset    Coronary artery disease Mother Ferdinand Bazan. 85    Breast cancer Mother Ferdinand Bazan.     Cancer Mother Ferdinand Bazan.     Diabetes Mother Ferdinand Bazan.     Arthritis Mother Ferdinand Bazan.     Lung disease Father  77        Chronic Obstructive Lung Disease    Depression Son Shaka gudino jr         Lifestyle:  Height and Weight:   BMI: Body mass index is 29.18 kg/m².     Other:  MG breast density: B  Prior thoracic RT: none  Genetic testing: No  Ashkenazi Buddhism descent: No    Other History:     Past Medical History:   Diagnosis Date    Anxiety and depression 04/08/2023    Dementia     Essential hypertension 04/08/2023    Mixed hyperlipidemia 04/08/2023    Osteopenia of multiple sites 04/08/2023    Type 2 diabetes mellitus with diabetic peripheral angiopathy without gangrene, with long-term current use of insulin 04/08/2023        Past Surgical History:   Procedure Laterality Date    EYE SURGERY  09-    GI obstruction N/A     HYSTERECTOMY      PHACOEMULSIFICATION, CATARACT, WITH IOL INSERTION Left 06/04/2024    Procedure: PHACOEMULSIFICATION, CATARACT, WITH IOL INSERTION;  Surgeon: Jose Daniel Villarreal MD;  Location: [facility] OR;  Service: Ophthalmology;  Laterality:  Left;  3rd 0700    PHACOEMULSIFICATION, CATARACT, WITH IOL INSERTION Right 09/03/2024    Procedure: PHACOEMULSIFICATION, CATARACT, WITH IOL INSERTION;  Surgeon: Jose Daniel Villarreal MD;  Location: [facility];  Service: Ophthalmology;  Laterality: Right;  2nd 0630    TUBAL LIGATION          Social History     Socioeconomic History    Marital status:     Number of children: 3   Occupational History    Occupation: Retired: [employer]   Tobacco Use    Smoking status: Never    Smokeless tobacco: Never   Substance and Sexual Activity    Alcohol use: Never    Drug use: Never    Sexual activity: Not Currently     Partners: Male     Birth control/protection: None     Social Drivers of Health     Financial Resource Strain: Low Risk  (5/8/2024)    Overall Financial Resource Strain (CARDIA)     Difficulty of Paying Living Expenses: Not hard at all   Food Insecurity: No Food Insecurity (5/8/2024)    Hunger Vital Sign     Worried About Running Out of Food in the Last Year: Never true     Ran Out of Food in the Last Year: Never true   Transportation Needs: No Transportation Needs (5/8/2024)    TRANSPORTATION NEEDS     Transportation : No   Physical Activity: Sufficiently Active (5/8/2024)    Exercise Vital Sign     Days of Exercise per Week: 7 days     Minutes of Exercise per Session: 60 min   Stress: No Stress Concern Present (5/8/2024)    [employer] [employer] [place] [employer] - Occupational Stress Questionnaire     Feeling of Stress : Not at all   Housing Stability: Low Risk  (5/8/2024)    Housing Stability Vital Sign     Unable to Pay for Housing in the Last Year: No     Homeless in the Last Year: No        Immunization History   Administered Date(s) Administered    COVID-19 MRNA, LN-S PF (MODERNA HALF 0.25 ML DOSE) 04/19/2022    COVID-19, MRNA, LN-S, PF (Pfizer) (Purple Cap) 01/20/2021, 02/10/2021    Influenza - Quadrivalent - High Dose - PF (65 years and older) 10/06/2020, 10/03/2021, 10/31/2023    Influenza - Trivalent -  Fluzone High Dose - PF (65 years and older) 08/20/2024    Influenza A (H1N1) 2009 Monovalent - IM 01/07/2010    Pneumococcal Conjugate - 20 Valent 02/19/2024, 02/20/2024    RSVpreF (Arexvy) 10/31/2023    Zoster Recombinant 09/18/2019, 10/17/2021        Medications/Allergies:       Current Outpatient Medications   Medication Instructions    amLODIPine (NORVASC) 5 mg, Oral, Daily    aspirin (ECOTRIN) 81 mg, Oral, Daily    blood sugar diagnostic Strp To check BG 3 times daily, to use with insurance preferred meter    blood-glucose meter kit To check BG 3 times daily, to use with insurance preferred meter    blood-glucose sensor (FREESTYLE NISHA 3 SENSOR) Suzanne 1 Device, Misc.(Non-Drug; Combo Route), Every 14 days    cloNIDine (CATAPRES) 0.1 mg, Oral, As needed (PRN)    ibuprofen (ADVIL,MOTRIN) 200 mg, Daily PRN    JARDIANCE 25 mg tablet 1 tablet Orally Once a day for 30 days    ketorolac 0.5% (ACULAR) 0.5 % Drop 1 drop, 4 times daily    lancets Misc To check BG 3 times daily, to use with insurance preferred meter    LANTUS SOLOSTAR U-100 INSULIN 100 unit/mL (3 mL) InPn pen INJECT 10 UNITS SUBCUTANEOUSLY ONCE DAILY    moxifloxacin (VIGAMOX) 0.5 % ophthalmic solution 1 drop, 4 times daily    olmesartan (BENICAR) 40 mg, Oral, Daily    prednisoLONE acetate (PRED FORTE) 1 % DrpS 1 drop, 2 times daily    rosuvastatin (CRESTOR) 10 mg, Oral, Daily    sertraline (ZOLOFT) 50 mg, Oral, Daily       Review of patient's allergies indicates:  No Known Allergies     Review of Systems:      ROS    Constitutional: denies fevers, chills, weight loss  HEENT: denies blurry/double vision, changes in hearing, odynophagia, dysphagia  Respiratory: denies cough, shortness of breath  Cardiovascular: denies palpitations, swelling of the extremities  GI: denies abdominal pain, nausea/vomiting, hematochezia, frequent stools  : denies frequency, dysuria, flank pain, hematuria  Skin: denies new rashes  Neurological: denies muscular/sensory  "deficiencies, loss of coordination, headaches, positive memory changes  Endo: denies hot flashes, heat/cold intolerance, lumps in the neck area  Heme: denies easy bruising and fatigue  Psychological: denies anxious/depressive moods  Musculoskeletal: denies bony pain, muscle cramps, swollen joints       Objective/Physical Exam   Visit Vitals  /77 (BP Location: Right arm, Patient Position: Sitting)   Pulse 72   Temp 98.3 °F (36.8 °C) (Oral)   Resp 18   Ht 5' 4" (1.626 m)   Wt 77.1 kg (170 lb)   SpO2 98%   BMI 29.18 kg/m²        Physical Exam     General: The patient is awake, alert and oriented. The patient is well nourished. No acute distress.  Neck: The neck is supple. The thyroid is not enlarged.  Musculoskeletal: The patient has a normal range of motion of her bilateral upper extremities.  Heart: Regular rate and rhythm, no murmurs.   Lung: No increased work of breathing. Clear breath sounds bilaterally.  Lymph nodes: There is no axillary, supraclavicular or cervical lymphadenopathy.  Breast:  Right:   On inspection there is no skin dimpling, rashes, retraction, erythema or skin abnormalities. The nipple areolar complex is normal with an everted nipple. The breasts move normally with movement of the pectoralis muscle. On palpation there are no dominant masses. There is no tenderness. There is no nipple discharge.  Left:   On inspection there is ecchymosis in the inferior breast at 5-6 o'clock near the inframammary fold.  There is no no skin dimpling, rashes, retraction. The nipple areolar complex is normal with an everted nipple. The breasts move normally with movement of the pectoralis muscle. On palpation there is a palpable hematoma at the site of biopsy (5-6 o'clock 5CFN) that is about 2 cm in size. There is no tenderness. There is no nipple discharge.         Assessment and Plan     1. Invasive ductal carcinoma of breast, female, left  - Ambulatory referral/consult to Breast Surgery    Tara Parker is a " 79 y.o.female who presents with a new diagnosis of left breast cancer. She has a 0.8cm tumor at 5, 5CFN. Pathology revealed IDC with mucinous features, G1, Er 94%, CA 93%, Her2 negative by FISH.  She is clinically and radiographically node negative.  The tumor currently has a decent size hematoma around it. No further imaging needed.      We discussed the need to proceed with local and systemic treatment to remove her cancer. As it currently stands, the patient is a candidate for either breast conserving surgery (partial mastectomy/lumpectomy) and sentinel lymph node biopsy followed by radiation or mastectomy (with or without immediate or delayed reconstruction) and sentinel lymph node biopsy.      We discussed lumpectomy/partial mastectomy and mastectomy. The rationale for lumpectomy and the need to obtain clear margins was specifically addressed.  If the tumor was found only on imaging, it will need localization to allow accurate removal.  The necessity of adding radiation following was explained. The logistics of radiation were discussed and the patient will further discuss details of radiation with her Radiation Oncologist.     The general operative procedure of mastectomy was discussed.  Occasionally radiation is recommended after mastectomy depending on tumor size, skin involvement, lymph node involvement, and final pathology.      We discussed sentinel lymph node mapping and its rationale. We also discussed the [facility] choosing wisely guidelines which state that in women 70+ with small tumors and good tumor biology, SLNB can be safely omitted.  However, it is still considered standard of care to do SLNB.     I informed her of the complications of surgery in general which include surgical site infections, hematoma or seroma requiring operation, pain, swelling, necrosis of nipple-areola and/or mastectomy flaps, positive margins, unplanned re-operations, breast deformity, lymphedema, paresthesias, and delay in  adjuvant treatments and other risks related to anesthesia.      We also briefly discussed the rationale for adjuvant systemic treatment in the form of either chemotherapy and/or hormonal therapy. This would depend on the presence of hormone receptors in the tumor. Further recommendations regarding chemotherapy will be finalized by Medical Oncology.      Plan:  After discussion and shared decision making with the patient we have formulated the following treatment plan:  1. Breast Surgery: partial mastectomy with localization left  2. Lymph node surgery:  We did discuss omission of SLNB based on O choosing wisely guidelines and she is currently undecided  3. Further work up:  Cardiac clearance  4. Further consultation: Medical and Radiation Oncology after surgery   5. Neoadjuvant hormonal/chemotherapy: no    The patient understands that it is possible this plan could change depending on further work up, consultations and review at our multidisciplinary tumor board. Our care team consisting of our breast surgeons, nurse navigators, PAs, our other specialists and other support staff will be following her closely as she moves closer to her surgery to ensure she is comfortable with the plan and we may have her return for further discussion if needed.    Allie Mena MD  Breast Surgical Oncology       I spent a total of 60 minutes on the day of the visit.  This includes face to face time and non-face to face time preparing to see the patient (eg, review of tests), obtaining and/or reviewing separately obtained history, documenting clinical information in the electronic or other health record, independently interpreting results and communicating results to the patient/family/caregiver, or care coordinator.

## 2024-10-09 ENCOUNTER — OFFICE VISIT (OUTPATIENT)
Dept: SURGERY | Facility: CLINIC | Age: 79
End: 2024-10-09
Payer: MEDICARE

## 2024-10-09 VITALS
SYSTOLIC BLOOD PRESSURE: 139 MMHG | OXYGEN SATURATION: 98 % | TEMPERATURE: 98 F | HEART RATE: 72 BPM | HEIGHT: 64 IN | RESPIRATION RATE: 18 BRPM | BODY MASS INDEX: 29.02 KG/M2 | WEIGHT: 170 LBS | DIASTOLIC BLOOD PRESSURE: 77 MMHG

## 2024-10-09 DIAGNOSIS — C50.912 INVASIVE DUCTAL CARCINOMA OF BREAST, FEMALE, LEFT: ICD-10-CM

## 2024-10-09 DIAGNOSIS — C50.912 INVASIVE DUCTAL CARCINOMA OF BREAST, FEMALE, LEFT: Primary | ICD-10-CM

## 2024-10-09 DIAGNOSIS — Z01.818 PREOP TESTING: Primary | ICD-10-CM

## 2024-10-09 PROCEDURE — 99999 PR PBB SHADOW E&M-EST. PATIENT-LVL V: CPT | Mod: PBBFAC,,, | Performed by: STUDENT IN AN ORGANIZED HEALTH CARE EDUCATION/TRAINING PROGRAM

## 2024-10-09 RX ORDER — INSULIN GLARGINE 100 [IU]/ML
INJECTION, SOLUTION SUBCUTANEOUS
COMMUNITY
Start: 2024-09-13

## 2024-10-10 ENCOUNTER — OFFICE VISIT (OUTPATIENT)
Dept: NEUROLOGY | Facility: CLINIC | Age: 79
End: 2024-10-10
Payer: MEDICARE

## 2024-10-10 VITALS
HEIGHT: 64 IN | WEIGHT: 156 LBS | SYSTOLIC BLOOD PRESSURE: 138 MMHG | BODY MASS INDEX: 26.63 KG/M2 | DIASTOLIC BLOOD PRESSURE: 90 MMHG

## 2024-10-10 DIAGNOSIS — F02.B0 MODERATE LATE ONSET ALZHEIMER'S DEMENTIA WITHOUT BEHAVIORAL DISTURBANCE, PSYCHOTIC DISTURBANCE, MOOD DISTURBANCE, OR ANXIETY: Primary | ICD-10-CM

## 2024-10-10 DIAGNOSIS — G30.1 MODERATE LATE ONSET ALZHEIMER'S DEMENTIA WITHOUT BEHAVIORAL DISTURBANCE, PSYCHOTIC DISTURBANCE, MOOD DISTURBANCE, OR ANXIETY: Primary | ICD-10-CM

## 2024-10-10 PROCEDURE — 99999 PR PBB SHADOW E&M-EST. PATIENT-LVL III: CPT | Mod: PBBFAC,,, | Performed by: SPECIALIST

## 2024-10-10 PROCEDURE — 99215 OFFICE O/P EST HI 40 MIN: CPT | Mod: S$GLB,,, | Performed by: SPECIALIST

## 2024-10-10 PROCEDURE — 1159F MED LIST DOCD IN RCRD: CPT | Mod: CPTII,S$GLB,, | Performed by: SPECIALIST

## 2024-10-10 PROCEDURE — 3075F SYST BP GE 130 - 139MM HG: CPT | Mod: CPTII,S$GLB,, | Performed by: SPECIALIST

## 2024-10-10 PROCEDURE — 3080F DIAST BP >= 90 MM HG: CPT | Mod: CPTII,S$GLB,, | Performed by: SPECIALIST

## 2024-10-10 PROCEDURE — 1101F PT FALLS ASSESS-DOCD LE1/YR: CPT | Mod: CPTII,S$GLB,, | Performed by: SPECIALIST

## 2024-10-10 PROCEDURE — 3288F FALL RISK ASSESSMENT DOCD: CPT | Mod: CPTII,S$GLB,, | Performed by: SPECIALIST

## 2024-10-10 RX ORDER — DONEPEZIL HYDROCHLORIDE 5 MG/1
5 TABLET, FILM COATED ORAL EVERY MORNING
Qty: 30 TABLET | Refills: 0 | Status: SHIPPED | OUTPATIENT
Start: 2024-11-07 | End: 2025-11-07

## 2024-10-10 RX ORDER — MEMANTINE HYDROCHLORIDE 5 MG/1
5 TABLET ORAL DAILY
Qty: 30 TABLET | Refills: 0 | Status: SHIPPED | OUTPATIENT
Start: 2024-10-10 | End: 2024-11-09

## 2024-10-10 RX ORDER — MEMANTINE HYDROCHLORIDE 10 MG/1
10 TABLET ORAL DAILY
Qty: 30 TABLET | Refills: 11 | Status: SHIPPED | OUTPATIENT
Start: 2024-11-07 | End: 2025-11-07

## 2024-10-10 RX ORDER — CEFAZOLIN SODIUM 2 G/50ML
2 SOLUTION INTRAVENOUS
OUTPATIENT
Start: 2024-10-10

## 2024-10-10 RX ORDER — ONDANSETRON 4 MG/1
8 TABLET, ORALLY DISINTEGRATING ORAL EVERY 8 HOURS PRN
OUTPATIENT
Start: 2024-10-10

## 2024-10-10 RX ORDER — SODIUM CHLORIDE, SODIUM LACTATE, POTASSIUM CHLORIDE, CALCIUM CHLORIDE 600; 310; 30; 20 MG/100ML; MG/100ML; MG/100ML; MG/100ML
INJECTION, SOLUTION INTRAVENOUS CONTINUOUS
OUTPATIENT
Start: 2024-10-10

## 2024-10-10 RX ORDER — DONEPEZIL HYDROCHLORIDE 10 MG/1
10 TABLET, FILM COATED ORAL EVERY MORNING
Qty: 30 TABLET | Refills: 11 | Status: SHIPPED | OUTPATIENT
Start: 2024-12-05 | End: 2025-12-05

## 2024-10-10 NOTE — NURSING
Distress score of :2  Met with the patient and son in the consult room. The son had his phone on speaker for additional family members to be included in the consult.  Referred patient to the [facility] and the [employer] with her and her sons permission. They understand that these are resources that provide support throughout the course of her treatment. Breast Cancer Binder given to patient.  All questions answered at this time.

## 2024-10-10 NOTE — PROGRESS NOTES
"Subjective:         Patient ID: Tara Parker is a 79 y.o. female.    Chief Complaint: cognitive impairment or dementia follow up     HPI:           6 month dementia f/u   Pt son julián franks w pt. Son states patient cognitive status has declined. Pt son states that she looses train of thought easily, misplaces things, and  can't retrace steps. Pt lives alone with occasional assistance. Pt doesn't drive. Denies assistance with ADL's. Pt son states pt "gets agitated because she can no longer be independent".     Pt states that at her sons house she sleeps well (tiffanie) , but at her home she barely sleeps (Alphonso). Denies complications with bladdder/bowel.    ...  notes may also be on facesheet for HPI, ROS, and other sections     Review of Systems  Falls:  Hallucinations:   RBD:   Sleep:        Social History     Socioeconomic History    Marital status:     Number of children: 3   Occupational History    Occupation: Retired: [employer]   Tobacco Use    Smoking status: Never    Smokeless tobacco: Never   Substance and Sexual Activity    Alcohol use: Never    Drug use: Never    Sexual activity: Not Currently     Partners: Male     Birth control/protection: None     Social Drivers of Health     Financial Resource Strain: Low Risk  (5/8/2024)    Overall Financial Resource Strain (CARDIA)     Difficulty of Paying Living Expenses: Not hard at all   Food Insecurity: No Food Insecurity (5/8/2024)    Hunger Vital Sign     Worried About Running Out of Food in the Last Year: Never true     Ran Out of Food in the Last Year: Never true   Transportation Needs: No Transportation Needs (5/8/2024)    TRANSPORTATION NEEDS     Transportation : No   Physical Activity: Sufficiently Active (5/8/2024)    Exercise Vital Sign     Days of Exercise per Week: 7 days     Minutes of Exercise per Session: 60 min   Stress: No Stress Concern Present (5/8/2024)    [employer] [employer] - Occupational Stress Questionnaire     " Feeling of Stress : Not at all   Housing Stability: Low Risk  (5/8/2024)    Housing Stability Vital Sign     Unable to Pay for Housing in the Last Year: No     Homeless in the Last Year: No         Current Outpatient Medications:     amLODIPine (NORVASC) 5 MG tablet, Take 1 tablet (5 mg total) by mouth once daily., Disp: 90 tablet, Rfl: 3    aspirin (ECOTRIN) 81 MG EC tablet, Take 1 tablet (81 mg total) by mouth once daily., Disp: 90 tablet, Rfl: 3    blood sugar diagnostic Strp, To check BG 3 times daily, to use with insurance preferred meter, Disp: 200 strip, Rfl: 3    blood-glucose meter kit, To check BG 3 times daily, to use with insurance preferred meter, Disp: 1 each, Rfl: 0    blood-glucose sensor (FREESTYLE NISAH 3 SENSOR) Suzanne, 1 Device by Misc.(Non-Drug; Combo Route) route every 14 (fourteen) days., Disp: 3 each, Rfl: 3    cloNIDine (CATAPRES) 0.1 MG tablet, Take 1 tablet (0.1 mg total) by mouth as needed (for BP over 160/110)., Disp: 60 tablet, Rfl: 1    ibuprofen (ADVIL,MOTRIN) 200 MG tablet, Take 200 mg by mouth daily as needed for Pain., Disp: , Rfl:     JARDIANCE 25 mg tablet, 1 tablet Orally Once a day for 30 days, Disp: , Rfl:     ketorolac 0.5% (ACULAR) 0.5 % Drop, Place 1 drop into the left eye 4 (four) times daily., Disp: , Rfl:     lancets Misc, To check BG 3 times daily, to use with insurance preferred meter, Disp: 200 each, Rfl: 2    LANTUS SOLOSTAR U-100 INSULIN 100 unit/mL (3 mL) InPn pen, INJECT 10 UNITS SUBCUTANEOUSLY ONCE DAILY, Disp: , Rfl:     olmesartan (BENICAR) 40 MG tablet, Take 1 tablet (40 mg total) by mouth once daily., Disp: 90 tablet, Rfl: 1    prednisoLONE acetate (PRED FORTE) 1 % DrpS, Place 1 drop into the left eye 2 (two) times daily., Disp: , Rfl:     rosuvastatin (CRESTOR) 10 MG tablet, Take 1 tablet (10 mg total) by mouth once daily., Disp: 90 tablet, Rfl: 3    sertraline (ZOLOFT) 50 MG tablet, Take 1 tablet (50 mg total) by mouth once daily., Disp: 90 tablet, Rfl: 1     "moxifloxacin (VIGAMOX) 0.5 % ophthalmic solution, Place 1 drop into the left eye 4 (four) times daily. (Patient not taking: Reported on 10/10/2024), Disp: , Rfl:      Objective:      Exam  BP (!) 138/90   Ht 5' 4" (1.626 m)   Wt 70.8 kg (156 lb)   BMI 26.78 kg/m²     General:    Accompanied, by__ son and he called his cousin   heart:   pharynx:    Neurological []nl  []Abnml:     Speech: Ok    vis fields: [] []  EOMs:  [] []  funduscopic: [] []  Motor:   [] []  coord:   [] []  Gait:  Unassisted     Neuroimaging: Images and imaging reports reviewed.  My comments:  gen atrophy on mri 5.2023    Labs:    Multiple Issues/ diagnoses or problems  [if not enumerated in note then discussed but not documented]    Complexity of Data:      High    Images and reports reviewed:  History obtained from family or accompaniment:       Risks:   High     (poss or definite) neurodegenerative condition and inherent progression  Fall risk   Driving discussed     MDM:       High          Assessment/Plan:       Problem List Items Addressed This Visit          Neuro    Moderate late onset Alzheimer's dementia without behavioral disturbance, psychotic disturbance, mood disturbance, or anxiety - Primary           Other comments/ follow up:        Memantine 5mg each am one month then increase to ten mg each am   Once on 10mg each am can start donepezil 5mg each am one month then 10mg     So ultimately on 10mg of each each AM     Medications Ordered This Encounter   Medications    memantine (NAMENDA) 5 MG Tab    Sig: Take 1 tablet (5 mg total) by mouth once daily.    Dispense:  30 tablet    Refill:  0       memantine (NAMENDA) 10 MG Tab    Sig: Take 1 tablet (10 mg total) by mouth once daily.    Dispense:  30 tablet    Refill:  11    10mg each am to start in nov     donepeziL (ARICEPT) 5 MG tablet    Sig: Take 1 tablet (5 mg total) by mouth every morning.    Dispense:  30 tablet    Refill:  0    To start in November then in December should " increase to 10mg each am   donepeziL (ARICEPT) 10 MG tablet     Sig: Take 1 tablet (10 mg total) by mouth every morning.     Dispense:  30 tablet     Refill:  11     To start in dec     Aim revisit 6 mos       Juan David Zapata MD [facility]

## 2024-10-18 DIAGNOSIS — F32.A ANXIETY AND DEPRESSION: ICD-10-CM

## 2024-10-18 DIAGNOSIS — F41.9 ANXIETY AND DEPRESSION: ICD-10-CM

## 2024-10-21 RX ORDER — SERTRALINE HYDROCHLORIDE 50 MG/1
50 TABLET, FILM COATED ORAL DAILY
Qty: 90 TABLET | Refills: 1 | Status: SHIPPED | OUTPATIENT
Start: 2024-10-21 | End: 2025-04-19

## 2024-10-28 LAB
LEFT EYE DM RETINOPATHY: NEGATIVE
RIGHT EYE DM RETINOPATHY: NEGATIVE

## 2024-11-04 ENCOUNTER — TELEPHONE (OUTPATIENT)
Dept: FAMILY MEDICINE | Facility: CLINIC | Age: 79
End: 2024-11-04
Payer: MEDICARE

## 2024-11-04 DIAGNOSIS — Z79.4 TYPE 2 DIABETES MELLITUS WITH DIABETIC PERIPHERAL ANGIOPATHY WITHOUT GANGRENE, WITH LONG-TERM CURRENT USE OF INSULIN: Primary | ICD-10-CM

## 2024-11-04 DIAGNOSIS — E11.51 TYPE 2 DIABETES MELLITUS WITH DIABETIC PERIPHERAL ANGIOPATHY WITHOUT GANGRENE, WITH LONG-TERM CURRENT USE OF INSULIN: Primary | ICD-10-CM

## 2024-11-04 RX ORDER — BLOOD-GLUCOSE SENSOR
1 EACH MISCELLANEOUS
Qty: 3 EACH | Refills: 3 | Status: SHIPPED | OUTPATIENT
Start: 2024-11-04 | End: 2025-11-04

## 2024-11-04 RX ORDER — BLOOD-GLUCOSE SENSOR
1 EACH MISCELLANEOUS
Qty: 3 EACH | Refills: 3 | Status: SHIPPED | OUTPATIENT
Start: 2024-11-04 | End: 2024-11-04

## 2024-11-06 ENCOUNTER — HOSPITAL ENCOUNTER (OUTPATIENT)
Dept: RADIOLOGY | Facility: HOSPITAL | Age: 79
Discharge: HOME | End: 2024-11-06
Attending: STUDENT IN AN ORGANIZED HEALTH CARE EDUCATION/TRAINING PROGRAM
Payer: MEDICARE

## 2024-11-06 DIAGNOSIS — R92.8 ABNORMAL MAMMOGRAM: ICD-10-CM

## 2024-11-06 PROCEDURE — A4648 IMPLANTABLE TISSUE MARKER: HCPCS

## 2024-11-06 PROCEDURE — 77061 BREAST TOMOSYNTHESIS UNI: CPT | Mod: 26,LT,, | Performed by: RADIOLOGY

## 2024-11-06 PROCEDURE — 77065 DX MAMMO INCL CAD UNI: CPT | Mod: TC,LT

## 2024-11-06 PROCEDURE — 77065 DX MAMMO INCL CAD UNI: CPT | Mod: 26,LT,, | Performed by: RADIOLOGY

## 2024-11-06 PROCEDURE — 19285 PERQ DEV BREAST 1ST US IMAG: CPT | Mod: LT,,, | Performed by: RADIOLOGY

## 2024-11-08 ENCOUNTER — HOSPITAL ENCOUNTER (OUTPATIENT)
Dept: RADIOLOGY | Facility: HOSPITAL | Age: 79
Discharge: HOME | End: 2024-11-08
Attending: STUDENT IN AN ORGANIZED HEALTH CARE EDUCATION/TRAINING PROGRAM | Admitting: STUDENT IN AN ORGANIZED HEALTH CARE EDUCATION/TRAINING PROGRAM
Payer: MEDICARE

## 2024-11-08 ENCOUNTER — HOSPITAL ENCOUNTER (OUTPATIENT)
Facility: HOSPITAL | Age: 79
Discharge: HOME | End: 2024-11-08
Attending: STUDENT IN AN ORGANIZED HEALTH CARE EDUCATION/TRAINING PROGRAM | Admitting: STUDENT IN AN ORGANIZED HEALTH CARE EDUCATION/TRAINING PROGRAM
Payer: MEDICARE

## 2024-11-08 ENCOUNTER — ANESTHESIA (OUTPATIENT)
Dept: SURGERY | Facility: HOSPITAL | Age: 79
End: 2024-11-08
Payer: MEDICARE

## 2024-11-08 ENCOUNTER — ANESTHESIA EVENT (OUTPATIENT)
Dept: SURGERY | Facility: HOSPITAL | Age: 79
End: 2024-11-08
Payer: MEDICARE

## 2024-11-08 DIAGNOSIS — C50.912 INVASIVE DUCTAL CARCINOMA OF BREAST, FEMALE, LEFT: ICD-10-CM

## 2024-11-08 DIAGNOSIS — C50.919 BREAST CANCER: ICD-10-CM

## 2024-11-08 DIAGNOSIS — R92.8 ABNORMAL MAMMOGRAM: ICD-10-CM

## 2024-11-08 LAB
ALBUMIN SERPL-MCNC: 3.9 G/DL (ref 3.4–4.8)
ALBUMIN/GLOB SERPL: 1.1 RATIO (ref 1.1–2)
ALP SERPL-CCNC: 109 UNIT/L (ref 40–150)
ALT SERPL-CCNC: 13 UNIT/L (ref 0–55)
ANION GAP SERPL CALC-SCNC: 9 MEQ/L
AST SERPL-CCNC: 26 UNIT/L (ref 5–34)
BASOPHILS # BLD AUTO: 0.04 X10(3)/MCL
BASOPHILS NFR BLD AUTO: 0.6 %
BILIRUB SERPL-MCNC: 0.4 MG/DL
BUN SERPL-MCNC: 26.1 MG/DL (ref 9.8–20.1)
CALCIUM SERPL-MCNC: 9.7 MG/DL (ref 8.4–10.2)
CHLORIDE SERPL-SCNC: 108 MMOL/L (ref 98–107)
CO2 SERPL-SCNC: 24 MMOL/L (ref 23–31)
CREAT SERPL-MCNC: 1.46 MG/DL (ref 0.55–1.02)
CREAT/UREA NIT SERPL: 18
EOSINOPHIL # BLD AUTO: 0.08 X10(3)/MCL (ref 0–0.9)
EOSINOPHIL NFR BLD AUTO: 1.2 %
ERYTHROCYTE [DISTWIDTH] IN BLOOD BY AUTOMATED COUNT: 14.2 % (ref 11.5–17)
GFR SERPLBLD CREATININE-BSD FMLA CKD-EPI: 36 ML/MIN/1.73/M2
GLOBULIN SER-MCNC: 3.5 GM/DL (ref 2.4–3.5)
GLUCOSE SERPL-MCNC: 130 MG/DL (ref 82–115)
HCT VFR BLD AUTO: 39.3 % (ref 37–47)
HGB BLD-MCNC: 12.2 G/DL (ref 12–16)
IMM GRANULOCYTES # BLD AUTO: 0.01 X10(3)/MCL (ref 0–0.04)
IMM GRANULOCYTES NFR BLD AUTO: 0.2 %
LYMPHOCYTES # BLD AUTO: 3 X10(3)/MCL (ref 0.6–4.6)
LYMPHOCYTES NFR BLD AUTO: 46.1 %
MCH RBC QN AUTO: 26.3 PG (ref 27–31)
MCHC RBC AUTO-ENTMCNC: 31 G/DL (ref 33–36)
MCV RBC AUTO: 84.7 FL (ref 80–94)
MONOCYTES # BLD AUTO: 0.59 X10(3)/MCL (ref 0.1–1.3)
MONOCYTES NFR BLD AUTO: 9.1 %
NEUTROPHILS # BLD AUTO: 2.79 X10(3)/MCL (ref 2.1–9.2)
NEUTROPHILS NFR BLD AUTO: 42.8 %
NRBC BLD AUTO-RTO: 0 %
PLATELET # BLD AUTO: 183 X10(3)/MCL (ref 130–400)
PMV BLD AUTO: 11.5 FL (ref 7.4–10.4)
POCT GLUCOSE: 104 MG/DL (ref 70–110)
POCT GLUCOSE: 139 MG/DL (ref 70–110)
POTASSIUM SERPL-SCNC: 4.7 MMOL/L (ref 3.5–5.1)
PROT SERPL-MCNC: 7.4 GM/DL (ref 5.8–7.6)
RBC # BLD AUTO: 4.64 X10(6)/MCL (ref 4.2–5.4)
SODIUM SERPL-SCNC: 141 MMOL/L (ref 136–145)
WBC # BLD AUTO: 6.51 X10(3)/MCL (ref 4.5–11.5)

## 2024-11-08 PROCEDURE — A9541 TC99M SULFUR COLLOID: HCPCS | Performed by: STUDENT IN AN ORGANIZED HEALTH CARE EDUCATION/TRAINING PROGRAM

## 2024-11-08 PROCEDURE — 63600175 PHARM REV CODE 636 W HCPCS: Performed by: NURSE ANESTHETIST, CERTIFIED REGISTERED

## 2024-11-08 PROCEDURE — 25000003 PHARM REV CODE 250: Performed by: ANESTHESIOLOGY

## 2024-11-08 PROCEDURE — 82962 GLUCOSE BLOOD TEST: CPT | Performed by: STUDENT IN AN ORGANIZED HEALTH CARE EDUCATION/TRAINING PROGRAM

## 2024-11-08 PROCEDURE — 38525 BIOPSY/REMOVAL LYMPH NODES: CPT | Mod: 51,LT,, | Performed by: STUDENT IN AN ORGANIZED HEALTH CARE EDUCATION/TRAINING PROGRAM

## 2024-11-08 PROCEDURE — 37000009 HC ANESTHESIA EA ADD 15 MINS: Performed by: STUDENT IN AN ORGANIZED HEALTH CARE EDUCATION/TRAINING PROGRAM

## 2024-11-08 PROCEDURE — 25000003 PHARM REV CODE 250: Performed by: NURSE ANESTHETIST, CERTIFIED REGISTERED

## 2024-11-08 PROCEDURE — 63600175 PHARM REV CODE 636 W HCPCS: Performed by: ANESTHESIOLOGY

## 2024-11-08 PROCEDURE — 71000016 HC POSTOP RECOV ADDL HR: Performed by: STUDENT IN AN ORGANIZED HEALTH CARE EDUCATION/TRAINING PROGRAM

## 2024-11-08 PROCEDURE — 36000706: Performed by: STUDENT IN AN ORGANIZED HEALTH CARE EDUCATION/TRAINING PROGRAM

## 2024-11-08 PROCEDURE — 19301 PARTIAL MASTECTOMY: CPT | Mod: LT,,, | Performed by: STUDENT IN AN ORGANIZED HEALTH CARE EDUCATION/TRAINING PROGRAM

## 2024-11-08 PROCEDURE — 38900 IO MAP OF SENT LYMPH NODE: CPT | Mod: LT,,, | Performed by: STUDENT IN AN ORGANIZED HEALTH CARE EDUCATION/TRAINING PROGRAM

## 2024-11-08 PROCEDURE — 63600175 PHARM REV CODE 636 W HCPCS: Mod: JW,JG | Performed by: STUDENT IN AN ORGANIZED HEALTH CARE EDUCATION/TRAINING PROGRAM

## 2024-11-08 PROCEDURE — 19301 PARTIAL MASTECTOMY: CPT | Mod: AS,LT,,

## 2024-11-08 PROCEDURE — 27201423 OPTIME MED/SURG SUP & DEVICES STERILE SUPPLY: Performed by: STUDENT IN AN ORGANIZED HEALTH CARE EDUCATION/TRAINING PROGRAM

## 2024-11-08 PROCEDURE — 71000015 HC POSTOP RECOV 1ST HR: Performed by: STUDENT IN AN ORGANIZED HEALTH CARE EDUCATION/TRAINING PROGRAM

## 2024-11-08 PROCEDURE — 37000008 HC ANESTHESIA 1ST 15 MINUTES: Performed by: STUDENT IN AN ORGANIZED HEALTH CARE EDUCATION/TRAINING PROGRAM

## 2024-11-08 PROCEDURE — 36000707: Performed by: STUDENT IN AN ORGANIZED HEALTH CARE EDUCATION/TRAINING PROGRAM

## 2024-11-08 PROCEDURE — 88307 TISSUE EXAM BY PATHOLOGIST: CPT | Performed by: STUDENT IN AN ORGANIZED HEALTH CARE EDUCATION/TRAINING PROGRAM

## 2024-11-08 PROCEDURE — C1819 TISSUE LOCALIZATION-EXCISION: HCPCS | Performed by: STUDENT IN AN ORGANIZED HEALTH CARE EDUCATION/TRAINING PROGRAM

## 2024-11-08 PROCEDURE — 71000033 HC RECOVERY, INTIAL HOUR: Performed by: STUDENT IN AN ORGANIZED HEALTH CARE EDUCATION/TRAINING PROGRAM

## 2024-11-08 PROCEDURE — 63600175 PHARM REV CODE 636 W HCPCS: Performed by: STUDENT IN AN ORGANIZED HEALTH CARE EDUCATION/TRAINING PROGRAM

## 2024-11-08 PROCEDURE — 76098 X-RAY EXAM SURGICAL SPECIMEN: CPT | Mod: 26,,, | Performed by: RADIOLOGY

## 2024-11-08 PROCEDURE — 76098 X-RAY EXAM SURGICAL SPECIMEN: CPT | Mod: TC

## 2024-11-08 RX ORDER — SODIUM CHLORIDE, SODIUM LACTATE, POTASSIUM CHLORIDE, CALCIUM CHLORIDE 600; 310; 30; 20 MG/100ML; MG/100ML; MG/100ML; MG/100ML
INJECTION, SOLUTION INTRAVENOUS CONTINUOUS
Status: DISCONTINUED | OUTPATIENT
Start: 2024-11-08 | End: 2024-11-08 | Stop reason: HOSPADM

## 2024-11-08 RX ORDER — HYDROCODONE BITARTRATE AND ACETAMINOPHEN 5; 325 MG/1; MG/1
1 TABLET ORAL EVERY 4 HOURS PRN
Status: DISCONTINUED | OUTPATIENT
Start: 2024-11-08 | End: 2024-11-08 | Stop reason: HOSPADM

## 2024-11-08 RX ORDER — CEFAZOLIN 2 G/1
2 INJECTION, POWDER, FOR SOLUTION INTRAMUSCULAR; INTRAVENOUS ONCE
Status: COMPLETED | OUTPATIENT
Start: 2024-11-08 | End: 2024-11-08

## 2024-11-08 RX ORDER — GLUCAGON 1 MG
1 KIT INJECTION
Status: DISCONTINUED | OUTPATIENT
Start: 2024-11-08 | End: 2024-11-08 | Stop reason: HOSPADM

## 2024-11-08 RX ORDER — HYDROMORPHONE HYDROCHLORIDE 2 MG/ML
0.4 INJECTION, SOLUTION INTRAMUSCULAR; INTRAVENOUS; SUBCUTANEOUS EVERY 5 MIN PRN
Status: DISCONTINUED | OUTPATIENT
Start: 2024-11-08 | End: 2024-11-08 | Stop reason: HOSPADM

## 2024-11-08 RX ORDER — ONDANSETRON HYDROCHLORIDE 2 MG/ML
INJECTION, SOLUTION INTRAMUSCULAR; INTRAVENOUS
Status: DISCONTINUED | OUTPATIENT
Start: 2024-11-08 | End: 2024-11-08

## 2024-11-08 RX ORDER — PROPOFOL 10 MG/ML
VIAL (ML) INTRAVENOUS
Status: DISCONTINUED | OUTPATIENT
Start: 2024-11-08 | End: 2024-11-08

## 2024-11-08 RX ORDER — CEFAZOLIN SODIUM 1 G/3ML
INJECTION, POWDER, FOR SOLUTION INTRAMUSCULAR; INTRAVENOUS
Status: DISCONTINUED | OUTPATIENT
Start: 2024-11-08 | End: 2024-11-08 | Stop reason: HOSPADM

## 2024-11-08 RX ORDER — ONDANSETRON 4 MG/1
8 TABLET, ORALLY DISINTEGRATING ORAL EVERY 8 HOURS PRN
Status: DISCONTINUED | OUTPATIENT
Start: 2024-11-08 | End: 2024-11-08 | Stop reason: HOSPADM

## 2024-11-08 RX ORDER — ACETAMINOPHEN 325 MG/1
650 TABLET ORAL EVERY 4 HOURS PRN
Status: DISCONTINUED | OUTPATIENT
Start: 2024-11-08 | End: 2024-11-08 | Stop reason: HOSPADM

## 2024-11-08 RX ORDER — PHENYLEPHRINE HYDROCHLORIDE 10 MG/ML
INJECTION INTRAVENOUS
Status: DISCONTINUED | OUTPATIENT
Start: 2024-11-08 | End: 2024-11-08

## 2024-11-08 RX ORDER — KETOROLAC TROMETHAMINE 30 MG/ML
INJECTION, SOLUTION INTRAMUSCULAR; INTRAVENOUS
Status: DISCONTINUED | OUTPATIENT
Start: 2024-11-08 | End: 2024-11-08

## 2024-11-08 RX ORDER — ISOSULFAN BLUE 50 MG/5ML
INJECTION, SOLUTION SUBCUTANEOUS
Status: DISCONTINUED | OUTPATIENT
Start: 2024-11-08 | End: 2024-11-08 | Stop reason: HOSPADM

## 2024-11-08 RX ORDER — ONDANSETRON 4 MG/1
4 TABLET, ORALLY DISINTEGRATING ORAL ONCE
Status: COMPLETED | OUTPATIENT
Start: 2024-11-08 | End: 2024-11-08

## 2024-11-08 RX ORDER — ISOSULFAN BLUE 50 MG/5ML
INJECTION, SOLUTION SUBCUTANEOUS
Status: DISCONTINUED
Start: 2024-11-08 | End: 2024-11-08 | Stop reason: HOSPADM

## 2024-11-08 RX ORDER — BUPIVACAINE HYDROCHLORIDE 5 MG/ML
INJECTION, SOLUTION EPIDURAL; INTRACAUDAL
Status: DISCONTINUED | OUTPATIENT
Start: 2024-11-08 | End: 2024-11-08 | Stop reason: HOSPADM

## 2024-11-08 RX ORDER — ONDANSETRON HYDROCHLORIDE 2 MG/ML
4 INJECTION, SOLUTION INTRAVENOUS EVERY 12 HOURS PRN
Status: CANCELLED | OUTPATIENT
Start: 2024-11-08

## 2024-11-08 RX ORDER — ROCURONIUM BROMIDE 10 MG/ML
INJECTION, SOLUTION INTRAVENOUS
Status: DISCONTINUED | OUTPATIENT
Start: 2024-11-08 | End: 2024-11-08

## 2024-11-08 RX ORDER — DIPHENHYDRAMINE HYDROCHLORIDE 50 MG/ML
25 INJECTION INTRAMUSCULAR; INTRAVENOUS ONCE
Status: DISCONTINUED | OUTPATIENT
Start: 2024-11-08 | End: 2024-11-08 | Stop reason: HOSPADM

## 2024-11-08 RX ORDER — CEFAZOLIN SODIUM 1 G/3ML
INJECTION, POWDER, FOR SOLUTION INTRAMUSCULAR; INTRAVENOUS
Status: DISCONTINUED
Start: 2024-11-08 | End: 2024-11-08 | Stop reason: HOSPADM

## 2024-11-08 RX ORDER — TECHNETIUM TC 99M SULFUR COLLOID 2 MG
1 KIT MISCELLANEOUS
Status: COMPLETED | OUTPATIENT
Start: 2024-11-08 | End: 2024-11-08

## 2024-11-08 RX ORDER — ONDANSETRON HYDROCHLORIDE 2 MG/ML
4 INJECTION, SOLUTION INTRAVENOUS DAILY PRN
Status: DISCONTINUED | OUTPATIENT
Start: 2024-11-08 | End: 2024-11-08 | Stop reason: HOSPADM

## 2024-11-08 RX ORDER — EPHEDRINE SULFATE 50 MG/ML
INJECTION, SOLUTION INTRAVENOUS
Status: DISCONTINUED | OUTPATIENT
Start: 2024-11-08 | End: 2024-11-08

## 2024-11-08 RX ORDER — MEPERIDINE HYDROCHLORIDE 25 MG/ML
12.5 INJECTION INTRAMUSCULAR; INTRAVENOUS; SUBCUTANEOUS ONCE
Status: DISCONTINUED | OUTPATIENT
Start: 2024-11-08 | End: 2024-11-08 | Stop reason: HOSPADM

## 2024-11-08 RX ORDER — FENTANYL CITRATE 50 UG/ML
INJECTION, SOLUTION INTRAMUSCULAR; INTRAVENOUS
Status: DISCONTINUED | OUTPATIENT
Start: 2024-11-08 | End: 2024-11-08

## 2024-11-08 RX ORDER — TRAMADOL HYDROCHLORIDE 50 MG/1
50 TABLET ORAL EVERY 8 HOURS PRN
Status: DISCONTINUED | OUTPATIENT
Start: 2024-11-08 | End: 2024-11-08 | Stop reason: HOSPADM

## 2024-11-08 RX ORDER — TRAMADOL HYDROCHLORIDE 50 MG/1
50 TABLET ORAL EVERY 8 HOURS PRN
Qty: 10 TABLET | Refills: 0 | Status: SHIPPED | OUTPATIENT
Start: 2024-11-08

## 2024-11-08 RX ORDER — LIDOCAINE HYDROCHLORIDE 10 MG/ML
INJECTION, SOLUTION EPIDURAL; INFILTRATION; INTRACAUDAL; PERINEURAL
Status: DISCONTINUED | OUTPATIENT
Start: 2024-11-08 | End: 2024-11-08

## 2024-11-08 RX ORDER — LIDOCAINE HYDROCHLORIDE 10 MG/ML
1 INJECTION, SOLUTION EPIDURAL; INFILTRATION; INTRACAUDAL; PERINEURAL ONCE
Status: DISCONTINUED | OUTPATIENT
Start: 2024-11-08 | End: 2024-11-08 | Stop reason: HOSPADM

## 2024-11-08 RX ORDER — SODIUM CHLORIDE, SODIUM GLUCONATE, SODIUM ACETATE, POTASSIUM CHLORIDE AND MAGNESIUM CHLORIDE 30; 37; 368; 526; 502 MG/100ML; MG/100ML; MG/100ML; MG/100ML; MG/100ML
INJECTION, SOLUTION INTRAVENOUS CONTINUOUS
Status: DISCONTINUED | OUTPATIENT
Start: 2024-11-08 | End: 2024-11-08 | Stop reason: HOSPADM

## 2024-11-08 RX ORDER — MIDAZOLAM HYDROCHLORIDE 2 MG/2ML
2 INJECTION, SOLUTION INTRAMUSCULAR; INTRAVENOUS ONCE AS NEEDED
Status: DISCONTINUED | OUTPATIENT
Start: 2024-11-08 | End: 2024-11-08 | Stop reason: HOSPADM

## 2024-11-08 RX ORDER — BUPIVACAINE HYDROCHLORIDE 5 MG/ML
INJECTION, SOLUTION EPIDURAL; INTRACAUDAL
Status: DISCONTINUED
Start: 2024-11-08 | End: 2024-11-08 | Stop reason: HOSPADM

## 2024-11-08 RX ADMIN — CEFAZOLIN 2 G: 2 INJECTION, POWDER, FOR SOLUTION INTRAMUSCULAR; INTRAVENOUS at 09:11

## 2024-11-08 RX ADMIN — EPHEDRINE SULFATE 10 MG: 50 INJECTION INTRAVENOUS at 10:11

## 2024-11-08 RX ADMIN — SODIUM CHLORIDE, POTASSIUM CHLORIDE, SODIUM LACTATE AND CALCIUM CHLORIDE: 600; 310; 30; 20 INJECTION, SOLUTION INTRAVENOUS at 10:11

## 2024-11-08 RX ADMIN — SUGAMMADEX 200 MG: 100 INJECTION, SOLUTION INTRAVENOUS at 11:11

## 2024-11-08 RX ADMIN — PROPOFOL 175 MG: 10 INJECTION, EMULSION INTRAVENOUS at 09:11

## 2024-11-08 RX ADMIN — ONDANSETRON 4 MG: 4 TABLET, ORALLY DISINTEGRATING ORAL at 07:11

## 2024-11-08 RX ADMIN — PHENYLEPHRINE HYDROCHLORIDE 200 MCG: 10 INJECTION INTRAVENOUS at 10:11

## 2024-11-08 RX ADMIN — SODIUM CHLORIDE: 9 INJECTION, SOLUTION INTRAVENOUS at 09:11

## 2024-11-08 RX ADMIN — ROCURONIUM BROMIDE 50 MG: 10 INJECTION, SOLUTION INTRAVENOUS at 09:11

## 2024-11-08 RX ADMIN — ONDANSETRON 4 MG: 2 INJECTION INTRAMUSCULAR; INTRAVENOUS at 11:11

## 2024-11-08 RX ADMIN — LIDOCAINE HYDROCHLORIDE 50 MG: 10 INJECTION, SOLUTION EPIDURAL; INFILTRATION; INTRACAUDAL; PERINEURAL at 09:11

## 2024-11-08 RX ADMIN — TECHNETIUM TC 99M SULFUR COLLOID 1 MILLICURIE: KIT at 09:11

## 2024-11-08 RX ADMIN — KETOROLAC TROMETHAMINE 30 MG: 30 INJECTION, SOLUTION INTRAMUSCULAR at 10:11

## 2024-11-08 RX ADMIN — FENTANYL CITRATE 50 MCG: 50 INJECTION, SOLUTION INTRAMUSCULAR; INTRAVENOUS at 09:11

## 2024-11-08 RX ADMIN — PHENYLEPHRINE HYDROCHLORIDE 100 MCG: 10 INJECTION INTRAVENOUS at 09:11

## 2024-11-08 RX ADMIN — ONDANSETRON 4 MG: 2 INJECTION INTRAMUSCULAR; INTRAVENOUS at 10:11

## 2024-11-08 NOTE — DISCHARGE INSTRUCTIONS
Post-operative Instructions     ü Do not drive, operate machinery, or any hazardous activity for at least 24 hours following  anesthesia or sedation and while taking narcotics. You may drive only once you are pain-  free and moving quickly and freely and without hesitation or discomfort.     ü Do not drink alcohol for at least 24 hours and while taking narcotics     ü Do not sign important papers or make important business decisions for 24 hours following  your surgery     ü Do not stay alone for 24 hours following surgery     Care at Home:     ü Advance diet as tolerated     ü Follow exercises on information sheet as tolerated     ü You may shower on postoperative day #1. Remove outer dressings and shower as you  normally would. If you have one or more drainage catheters, see below. Pat dry and apply  clean pads as needed after showering. Try to avoid using tape. If you have steri-strips,  leave them on until they fall off, or remove gently in 7-10 days. Do not soak in a tub or go  swimming for 2 weeks post surgery.     ü No lifting more than 10 lbs or strenuous physical activity prior to post-operative appointment     ü Move arms/ shoulders freely but no overhead lifting until instructed so at post-op or with  physical therapy     ü Wear your bra or ace wrap for 5-7 days - may be removed to wash and change clothes.     Pain Management     o Take acetaminophen (Tylenol) or ibuprofen (Motrin and others) or naproxen (Aleve) as  needed for pain. Follow the instructions on the bottle regarding dosage and daily limits.  You may alternate acetaminophen and either ibuprofen or naproxen if needed. Do not  take more than 3250 mg of acetaminophen (10 regular strength or 6 extra strength  tablets) in any 24 hour period.     o If you were provided a prescription for a narcotic you may take that instead of the  acetaminophen. If the narcotic contains acetaminophen (such as hydrocodone/APAP,  Norco, Vicodin or Percocet) be aware  of the total daily limit on acetaminophen.     o If you were not provided a prescription for a narcotic and feel that your pain is not  adequately controlled, call the Breast Center and a prescription can be sent to your  Pharmacy     o Please do not take more than 8 oxycodone or hydrocodone tablets per day and not take  additional Tylenol (acetaminophen) while on Percocet, Norco, Lortab or other medication  containing Tylenol.     o Narcotic pain medication can cause constipation. Please use over the counter  medication (Senakot S, Milk of Mag, Prune Juice, Mana-Colace, etc.) to prevent and  alleviate constipation post operatively.     o Do not take any aspirin until postoperative day #2..     o If you do get constipated, remember that Colace and other stool softeners are NOT  laxatives. If you have not had a bowel movement in a few days or become bloated you  will need to use something stronger. You may try prune juice or a mild laxative that has  worked for you in the past, but you will very likely require something stronger. A daily  dose of Miralax works very well for most patients. The most effective and quickest  acting solution is usually a suppository or enema. If you are constipated, you should  consider sending someone to the pharmacy for an over the counter suppository  (Dulcolax or similar products) and an over the counter enema (Fleets enema or other  products). Try one and then the other if needed. If nothing provides relief, contact the  Breast Center.     Call the Breast Center [phone] for the following:  ü Temperature greater than 101.4 degrees F.  ü For uncontrolled pain  ü Excessive bleeding, swelling, or pain at surgical site  ü Persistent nausea or vomiting  ü Foul smelling discharge from surgical wounds

## 2024-11-08 NOTE — BRIEF OP NOTE
[facility] - Periop Services  Brief Operative Note    Surgery Date: 11/8/2024     Surgeons and Role:     * Allie Mena MD - Primary    Assistant: Denae Negro PA-C    Pre-op Diagnosis:  Invasive ductal carcinoma of breast, female, left [C50.912]    Post-op Diagnosis:  Post-Op Diagnosis Codes:     * Invasive ductal carcinoma of breast, female, left [C50.912]    Procedure(s) (LRB):  MASTECTOMY, PARTIAL // Left / Need Sentimag need US Need Faxitron Need sterile charms (Left)  BIOPSY, LYMPH NODE, SENTINEL // Left /need nuc med need gabrielle node (Left)    Anesthesia: General    Operative Findings: breast and axillary tissue    Estimated Blood Loss: 10cc         Specimens:   ID Type Source Tests Collected by Time Destination   A : L Breast Partial Mastectomy @ 5 o'clock- Charms and marked margins Tissue Breast, Left SPECIMEN TO PATHOLOGY Allie Mena MD 11/8/2024 1027    B : L Axillary Sentinal Lymph Nodes #1 Tissue Lymph Node SPECIMEN TO PATHOLOGY Allie Mena MD 11/8/2024 1048        Discharge Note    OUTCOME: Patient tolerated treatment/procedure well without complication and is now ready for discharge.    DISPOSITION: Home or Self Care    FINAL DIAGNOSIS:  breast cancer left    FOLLOWUP: In clinic    DISCHARGE INSTRUCTIONS:    Discharge Procedure Orders   Diet general     Ice to affected area     Lifting restrictions     Remove dressing in 24 hours   Order Comments: Leave glue and steri strips until clinic visit     Call MD for:  temperature >100.4     Call MD for:  persistent nausea and vomiting     Call MD for:  severe uncontrolled pain     Call MD for:  difficulty breathing, headache or visual disturbances     Call MD for:  redness, tenderness, or signs of infection (pain, swelling, redness, odor or green/yellow discharge around incision site)     Call MD for:  hives     Call MD for:  persistent dizziness or light-headedness

## 2024-11-08 NOTE — OP NOTE
DATE OF PROCEDURE: 11/8/2024    SURGEON: Allie Mena M.D.    ASSISTANT:  Denae Negro PA-C    PREOPERATIVE DIAGNOSIS: Invasive ductal carcinoma of breast, female, left [C50.912]     POSTOPERATIVE DIAGNOSIS: Post-Op Diagnosis Codes:     * Invasive ductal carcinoma of breast, female, left [C50.912]     ANESTHESIA: General CRNA: Ian Bustos CRNA     PROCEDURES PERFORMED:   1. Left breast MagSeed localization partial mastectomy (lumpectomy) with excision for clear margins   2. Left axillary deep sentinel lymph node biopsy   3. Injection of left breast with isosulfan Lymphazurin blue dye intra-operatively and technetium-labeled radiocolloid for sentinel lymph node identification pre-operatively  4. Identification of left axillary sentinel lymph node       PROCEDURE IN DETAIL:   Tara Parker is a 79 y.o. female brought to the operating room for definitive surgical management of left breast cancer. The patient has elected to undergo breast conservation surgery with partial mastectomy with sentinel lymph node biopsy for kimberly assessment. The patient was informed of the possible risks and complications of the procedure, including but not limited to anesthetic risks, bleeding, infection, and need for additional surgery.  The patient concurred with the proposed plan, and has given informed consent.  The site of surgery was properly noted/marked in the preoperative holding area.    The patient underwent informed consent. The MagSeed was placed in the lower outer quadrant of the left breast. The MagSeed localization films were reviewed.    The left breast was injected in the subareolar region with the technetium-labeled radiocolloid by myself for SLNB identification..     She was then brought to the Operating Room and placed in the supine position. Anesthesia was administered. The left breast was further injected with the isosulfan Lymphazurin blue dye in the central subareolar region. The left breast, anterior chest,  arm and axilla were then prepped and draped in a sterile fashion.     Partial mastectomy was performed.  Attention was turned to the left breast itself. An incision was made in the lower outer quadrant of the left breast over the anticipated tract of the seed. The specimen was dissected circumferentially around the cancer. The dissection was carried out circumferentially to include the seed. The dissection was carried down to the pectoralis fascia, leaving the fascia intact. The specimen was completely dissected free. The seed localized partial mastectomy specimen was marked with margin charms.  It was then submitted for specimen radiograph to document adequate margins. Specimen radiograph confirmed the seed, clip and area of interest were within the specimen.    Within the lumpectomy cavity, hemostasis was achieved with cautery. The wound was irrigated until clear. There was no evidence of bleeding. It was closed in multiple layers with deep dermal and subcutaneous interrupted 3-0 Monocryl sutures and a running 4-0 Monocryl subcuticular skin closure.    Left [place] Lymph Node Biopsy was then performed  The sentinel lymph node biopsy was performed by using the gamma probe to locate the area of activity in the axilla, which was then marked on the skin surface with a skin marker. The area of the planned incision was just below the hair bearing area. Using a #15 blade, a transverse skin incision was made in the axilla. The subcutaneous tissue was divided using Bovie cautery while attention was paid to ensure hemostasis. Dissection was carried out until the axilla was entered by dividing through the clavipectoral fascia. Retractors were inserted for exposure and the gamma probe was inserted to locate the area of activity. Dissecting carefully with a combination of cautery and using DeBakey forceps and a right angle clamp, the sentinel lymph nodes were identified by either the presence of radioactivity, blue dye, or  suspicious palpable features. The afferent and efferent lymphatics were clipped and divided prior to removal of the node each time. The sentinel lymph node procedure was terminated when the gamma probe was re-inserted and increased additional activity could not be localized. There were no further blue lymphatics nor other suspiciously palpated nodes, therefore, a 10 second axillary basin count was obtained which was less than 10% of the hottest node.     Then both cavities were closed with interrupted 3-0 vicryl sutures. The subdermal layers were closed with 3-0 vicryl interrupted sutures and 4-0 running on the dermis. Steristrips were applied followed by sterile dressings.    All instruments and sponge counts were correct at the end of the procedure.     Significant Surgical Tasks Conducted by the Assistant(s), if Applicable: The skilled assistance of the Physician Assistant, Denae Negro PA-C, was necessary for the successful completion of this case. She was essential for proper positioning of the patient, manipulation of instruments, proper exposure, manipulation of tissue, and wound closure.       ESTIMATED BLOOD LOSS: 10cc    Implants: * No implants in log *    Specimens:   ID Type Source Tests Collected by Time Destination   A : L Breast Partial Mastectomy @ 5 o'clock- Charms and marked margins Tissue Breast, Left SPECIMEN TO PATHOLOGY Allie Mena MD 11/8/2024 1027    B : L Axillary Sentinal Lymph Nodes #1 Tissue Lymph Node SPECIMEN TO PATHOLOGY Allie Mena MD 11/8/2024 1048                   Condition: Good    Disposition: PACU - hemodynamically stable.    Attestation: I was present and scrubbed for the entire procedure.    Superior Node Biopsy for Breast Cancer - Left  Operation performed with curative intent Yes   Tracer(s) used to identify sentinel nodes in the upfront surgery (non-neoadjuvant) setting Dye and Radioactive tracer   Tracer(s) used to identify sentinel nodes in the neoadjuvant  setting N/A   All nodes (colored or non-colored) present at the end of a dye-filled lymphatic channel were removed Yes   All significantly radioactive nodes were removed Yes   All palpably suspicious nodes were removed Yes   Biopsy-proven positive nodes marked with clips prior to chemotherapy were identified and removed N/A

## 2024-11-08 NOTE — ANESTHESIA PREPROCEDURE EVALUATION
11/08/2024  Tara Parker is a 79 y.o., female.  Procedure Information    Case: [id number] Date/Time: 11/08/24 0815   Procedures:      MASTECTOMY, PARTIAL // Left / Need Sentimag need US Need Faxitron Need sterile charms (Left: Breast) - Need Sentimag  need US  Need Faxitron  Need sterile charms      BIOPSY, LYMPH NODE, SENTINEL // Left /need nuc med need gabrielle node (Left: Chest) - need nuc med  need gabrielle node   Anesthesia type: General   Diagnosis: Invasive ductal carcinoma of breast, female, left [C50.912]   Pre-op diagnosis: Invasive ductal carcinoma of breast, female, left [C50.912]   Location: [facility] OR [address] [place] [address] OR   Surgeons: Allie Mnea MD       Pre-op Assessment    I have reviewed the Patient Summary Reports.     I have reviewed the Nursing Notes. I have reviewed the NPO Status.   I have reviewed the Medications.     Review of Systems  Anesthesia Hx:  No problems with previous Anesthesia                Hematology/Oncology:  Hematology Normal   Oncology Normal                                   EENT/Dental:  EENT/Dental Normal           Cardiovascular:  Exercise tolerance: good   Hypertension                  Functional Capacity 3 METS                   Hypertension         Pulmonary:  Pulmonary Normal                       Renal/:   Denies Chronic Renal Disease.                Hepatic/GI:  Hepatic/GI Normal                    Musculoskeletal:  Musculoskeletal Normal                Neurological:  Neurology Normal                                      Endocrine:  Diabetes         Denies Morbid Obesity / BMI > 40  Dermatological:  Skin Normal    Psych:   anxiety                 Physical Exam  General: Alert, Oriented, Well nourished and Cooperative    Airway:  Mallampati: II   Mouth Opening: Normal  TM Distance: Normal  Tongue: Normal  Neck ROM: Normal ROM    Dental:  Intact    Chest/Lungs:  Clear to  auscultation, Normal Respiratory Rate    Heart:  Rate: Normal  Rhythm: Regular Rhythm       Latest Reference Range & Units 08/29/24 09:54   WBC 4.50 - 11.50 x10(3)/mcL 5.41   RBC 4.20 - 5.40 x10(6)/mcL 4.14 (L)   Hemoglobin 12.0 - 16.0 g/dL 11.1 (L)   Hematocrit 37.0 - 47.0 % 35.4 (L)   MCV 80.0 - 94.0 fL 85.5   MCH 27.0 - 31.0 pg 26.8 (L)   MCHC 33.0 - 36.0 g/dL 31.4 (L)   RDW 11.5 - 17.0 % 16.4   Platelet Count 130 - 400 x10(3)/mcL 233   MPV 7.4 - 10.4 fL 10.4   Neut % % 50.2   LYMPH % % 36.2   Mono % % 11.3   Eos % % 1.3   Basophil % % 0.6   Immature Granulocytes % 0.4   Neut # 2.1 - 9.2 x10(3)/mcL 2.72   Lymph # 0.6 - 4.6 x10(3)/mcL 1.96   Mono # 0.1 - 1.3 x10(3)/mcL 0.61   Eos # 0 - 0.9 x10(3)/mcL 0.07   Baso # <=0.2 x10(3)/mcL 0.03   Immature Grans (Abs) 0 - 0.04 x10(3)/mcL 0.02   nRBC % 0.0   Sodium 136 - 145 mmol/L 139   Potassium 3.5 - 5.1 mmol/L 4.9   Chloride 98 - 107 mmol/L 108 (H)   CO2 23 - 31 mmol/L 25   Anion Gap mEq/L 6.0   BUN 9.8 - 20.1 mg/dL 29.3 (H)   Creatinine 0.55 - 1.02 mg/dL 1.15 (H)   BUN/CREAT RATIO  25   eGFR mL/min/1.73/m2 49   Glucose 82 - 115 mg/dL 141 (H)   Calcium 8.4 - 10.2 mg/dL 9.7   ALP 40 - 150 unit/L 100   PROTEIN TOTAL 5.8 - 7.6 gm/dL 7.0   Albumin 3.4 - 4.8 g/dL 3.7   Albumin/Globulin Ratio 1.1 - 2.0 ratio 1.1   BILIRUBIN TOTAL <=1.5 mg/dL 0.3   AST 5 - 34 unit/L 22   ALT 0 - 55 unit/L 13   Globulin, Total 2.4 - 3.5 gm/dL 3.3   Cholesterol Total <=200 mg/dL 282 (H)   HDL 35 - 60 mg/dL 70 (H)   Total Cholesterol/HDL Ratio 0 - 5  4   Triglycerides 37 - 140 mg/dL 72   LDL Cholesterol 50.00 - 140.00 mg/dL 198.00 (H)   Very Low Density Lipoprotein  14   Hemoglobin A1C External <=7.0 % 7.6 (H)   Estimated Avg Glucose mg/dL 171.4   TSH 0.350 - 4.940 uIU/mL 4.287   Color, UA Yellow, Light-Yellow, Colorless, Straw, Dark-Yellow  Light-Yellow   Appearance, UA Clear  Clear   Specific Gravity,UA 1.005 - 1.030  1.019   pH, UA 5.0 - 8.5  5.5   Protein, UA Negative  1+ !   Glucose, UA  Negative, Normal  3+ !   Ketones, UA Negative  Negative   Blood, UA Negative  Negative   NITRITE UA Negative  Negative   Bilirubin (UA) Negative  Negative   Urobilinogen, UA 0.2, 1.0, Normal  Normal   Leukocyte Esterase, UA Negative  75 !   RBC, UA None Seen, 0-2, 3-5, 0-5 /HPF 0-5   WBC, UA None Seen, 0-2, 3-5, 0-5 /HPF 0-5   Bacteria, UA None Seen, Trace /HPF Many !   Squamous Epithelial Cells, UA None Seen, Trace, Rare /HPF Trace   Urine Creatinine 45.0 - 106.0 mg/dL 84.0   Urine Microalbumin <=30.0 ug/mL 497.0 (H)   MICROALB/CREAT RATIO 0.0 - 30.0 mg/gm Cr 591.7 (H)   (L): Data is abnormally low  (H): Data is abnormally high  !: Data is abnormal    Anesthesia Plan  Type of Anesthesia, risks & benefits discussed:    Anesthesia Type: Gen ETT  Intra-op Monitoring Plan: Standard ASA Monitors  Post Op Pain Control Plan: multimodal analgesia  Induction:  IV and Inhalation  Airway Plan: Direct  Informed Consent: Informed consent signed with the Patient and all parties understand the risks and agree with anesthesia plan.  All questions answered. Patient consented to blood products? Yes  ASA Score: 3  Day of Surgery Review of History & Physical: H&P Update referred to the surgeon/provider.I have interviewed and examined the patient. I have reviewed the patient's H&P dated: There are no significant changes.     Ready For Surgery From Anesthesia Perspective.     .

## 2024-11-08 NOTE — INTERVAL H&P NOTE
The patient has been examined and the H&P has been reviewed:    I concur with the findings and no changes have occurred since H&P was written.    Surgery risks, benefits and alternative options discussed and understood by patient/family.      There are no hospital problems to display for this patient.    Denae Negro PA-C

## 2024-11-08 NOTE — ANESTHESIA PROCEDURE NOTES
Intubation    Date/Time: 11/8/2024 9:36 AM    Performed by: Ian Bustos CRNA  Authorized by: Edson Mota MD    Intubation:     Induction:  Intravenous    Intubated:  Postinduction    Mask Ventilation:  Easy mask    Attempts:  1    Attempted By:  CRNA    Method of Intubation:  Direct    Blade:  Herrera 2    Laryngeal View Grade: Grade IIA - cords partially seen      Difficult Airway Encountered?: No      Complications:  None    Airway Device:  Oral endotracheal tube    Airway Device Size:  6.5    Style/Cuff Inflation:  Cuffed    Tube secured:  21    Secured at:  The lips    Placement Verified By:  Capnometry    Complicating Factors:  None    Findings Post-Intubation:  BS equal bilateral and atraumatic/condition of teeth unchanged

## 2024-11-08 NOTE — ANESTHESIA POSTPROCEDURE EVALUATION
Anesthesia Post Evaluation    Patient: Tara Parker    Procedure(s) Performed: Procedure(s) (LRB):  MASTECTOMY, PARTIAL // Left / Need Sentimag need US Need Faxitron Need sterile charms (Left)  BIOPSY, LYMPH NODE, SENTINEL // Left /need nuc med need gabrielle node (Left)    Final Anesthesia Type: general      Patient location during evaluation: PACU  Patient participation: No - Unable to Participate, Sedation  Level of consciousness: sedated  Post-procedure vital signs: reviewed and stable  Pain management: adequate  Airway patency: patent  DESIRAE mitigation strategies: Multimodal analgesia  PONV status at discharge: No PONV  Anesthetic complications: no      Cardiovascular status: stable  Respiratory status: nasal cannula  Hydration status: euvolemic  Follow-up not needed.              Vitals Value Taken Time   /81 11/08/24 0709   Temp 36.6 °C (97.9 °F) 11/08/24 0709   Pulse 70 11/08/24 0709   Resp 18 11/08/24 1119   SpO2 99 % 11/08/24 0709         No case tracking events are documented in the log.      Pain/Arnel Score: No data recorded

## 2024-11-10 VITALS
WEIGHT: 154.56 LBS | HEIGHT: 63 IN | OXYGEN SATURATION: 98 % | RESPIRATION RATE: 16 BRPM | SYSTOLIC BLOOD PRESSURE: 157 MMHG | TEMPERATURE: 98 F | BODY MASS INDEX: 27.39 KG/M2 | HEART RATE: 77 BPM | DIASTOLIC BLOOD PRESSURE: 81 MMHG

## 2024-11-12 LAB — PSYCHE PATHOLOGY RESULT: NORMAL

## 2024-11-18 ENCOUNTER — OFFICE VISIT (OUTPATIENT)
Dept: SURGERY | Facility: CLINIC | Age: 79
End: 2024-11-18
Payer: MEDICARE

## 2024-11-18 VITALS
HEIGHT: 63 IN | OXYGEN SATURATION: 97 % | WEIGHT: 158.63 LBS | SYSTOLIC BLOOD PRESSURE: 144 MMHG | BODY MASS INDEX: 28.11 KG/M2 | TEMPERATURE: 97 F | HEART RATE: 75 BPM | RESPIRATION RATE: 18 BRPM | DIASTOLIC BLOOD PRESSURE: 73 MMHG

## 2024-11-18 DIAGNOSIS — Z17.0 MALIGNANT NEOPLASM OF LOWER-OUTER QUADRANT OF LEFT BREAST OF FEMALE, ESTROGEN RECEPTOR POSITIVE: Primary | ICD-10-CM

## 2024-11-18 DIAGNOSIS — C50.512 MALIGNANT NEOPLASM OF LOWER-OUTER QUADRANT OF LEFT BREAST OF FEMALE, ESTROGEN RECEPTOR POSITIVE: Primary | ICD-10-CM

## 2024-11-18 PROCEDURE — 99024 POSTOP FOLLOW-UP VISIT: CPT | Mod: S$GLB,,, | Performed by: STUDENT IN AN ORGANIZED HEALTH CARE EDUCATION/TRAINING PROGRAM

## 2024-11-18 PROCEDURE — 1126F AMNT PAIN NOTED NONE PRSNT: CPT | Mod: CPTII,S$GLB,, | Performed by: STUDENT IN AN ORGANIZED HEALTH CARE EDUCATION/TRAINING PROGRAM

## 2024-11-18 PROCEDURE — 1159F MED LIST DOCD IN RCRD: CPT | Mod: CPTII,S$GLB,, | Performed by: STUDENT IN AN ORGANIZED HEALTH CARE EDUCATION/TRAINING PROGRAM

## 2024-11-18 PROCEDURE — 3077F SYST BP >= 140 MM HG: CPT | Mod: CPTII,S$GLB,, | Performed by: STUDENT IN AN ORGANIZED HEALTH CARE EDUCATION/TRAINING PROGRAM

## 2024-11-18 PROCEDURE — 1160F RVW MEDS BY RX/DR IN RCRD: CPT | Mod: CPTII,S$GLB,, | Performed by: STUDENT IN AN ORGANIZED HEALTH CARE EDUCATION/TRAINING PROGRAM

## 2024-11-18 PROCEDURE — 99999 PR PBB SHADOW E&M-EST. PATIENT-LVL V: CPT | Mod: PBBFAC,,, | Performed by: STUDENT IN AN ORGANIZED HEALTH CARE EDUCATION/TRAINING PROGRAM

## 2024-11-18 PROCEDURE — 3288F FALL RISK ASSESSMENT DOCD: CPT | Mod: CPTII,S$GLB,, | Performed by: STUDENT IN AN ORGANIZED HEALTH CARE EDUCATION/TRAINING PROGRAM

## 2024-11-18 PROCEDURE — 1101F PT FALLS ASSESS-DOCD LE1/YR: CPT | Mod: CPTII,S$GLB,, | Performed by: STUDENT IN AN ORGANIZED HEALTH CARE EDUCATION/TRAINING PROGRAM

## 2024-11-18 PROCEDURE — 3078F DIAST BP <80 MM HG: CPT | Mod: CPTII,S$GLB,, | Performed by: STUDENT IN AN ORGANIZED HEALTH CARE EDUCATION/TRAINING PROGRAM

## 2024-11-18 NOTE — PROGRESS NOTES
[facility] - [facility]  Breast Surgical Oncology  Est Patient Office Visit - post op        PCP: Lilliana De La Torre MD    Care Team: Data Unavailable     Chief Complaint:   Chief Complaint   Patient presents with    Post-op Evaluation     Patient reports no signs of infection, redness, swelling since surgery on 11/8/24        Subjective:    Treatments:  L breast partial mastectomy and SLNB 11/8/2024: IDC, G1 ER/AZ positive, Her2 negative. Stage IA.     Interval History:  11/19/2024 - Tara Parker returns today for post-op. She is s/p L partial mastectomy and SLNB on 11/8/24. She is doing well postoperatively.  Pain controlled.     History of Present Illness:  Tara Parker is a pleasant 79 y.o.female who presents as a referral from Lilliana De La Torre MD for a new diagnosis of left breast cancer.  This was picked up on screening mammogram.  In the left breast at 5:00, 5 cm from the nipple she has a 0.8 cm mass.  Pathology revealed IDC with mucinous features, grade 1, ER 94%, AZ 93%, HER2 2+, fish negative.  She has never had any breast surgeries or biopsies prior to this and she denies noting any changes with her breast.  Her family history is significant for mom with breast cancer at 76, and a maternal aunt with ovarian cancer.  She is 1 of 15 siblings.  She is present at the appointment today with 1 of her sons, Shaka.  She is currently living with him, but she has her own house and attends to all of her daily living activities.  Shaka states that she has early signs of dementia.      Imaging:   Bilateral screening mammogram 08/29/2024:  Density B.  BI-RADS 0.  Mass in the left breast at 6:00, posterior depth.  Asymmetry in the left breast lateral region middle depth on CC view.  No suspicious findings in the right breast.  Left diagnostic mammogram 09/05/2024:  Density B.  BI-RADS 5.  Central inferior left breast, posterior depth there is a persistent 0.8 cm mass with spiculated margins.  On  ultrasound this measures 0.8 x 0.3 x 0.5 cm.  In the 4:00 left breast, 2 cm from the nipple there is a 0.4 cm benign complicated cyst.    Ultrasound of the left axilla demonstrates no significant left axillary adenopathy.  Ultrasound-guided biopsy left breast 09/30/2024:  Mass at 5:00, 5 cm from the nipple: Successful.  Coil clip placed.  Good position.      Pathology:   Left breast 5:00, 5 cm from nipple mass core biopsy 09/30/2024: Invasive ductal carcinoma with mucinous features, well differentiated, grade 1, ER 93%, MI 93%, HER2 2+, fish negative.  L partial mastectomy and SLNB 11/8/24: IDC with partial mucinous features, G1, 10mm.  Margins negative. Closest margin posterior at 6mm. Two lymph nodes removed and both negative for tumor.     OB / GYN History   Menarche Onset:?  Menopause: Menopause: postmenopausal  Hormonal birth control (duration): 0years  Pregnancies: 3  Age at first child birth: ?  Child births: 3  Hysterectomy/Oophorectomy: hysterectomy without BSO  HRT: no    Family History of Cancer (& age at diagnosis):  -   Family History   Problem Relation Name Age of Onset    Coronary artery disease Mother Ferdinand Bazan. 85    Breast cancer Mother Ferdinand Bazan.     Cancer Mother Ferdinand Bazan.     Diabetes Mother Ferdinand Bazan.     Arthritis Mother Ferdinand Bazan.     Lung disease Father  77        Chronic Obstructive Lung Disease    Depression Son Shaka gudino jr         Lifestyle:  Height and Weight:   BMI: Body mass index is 28.09 kg/m².     Other:  MG breast density: B  Prior thoracic RT: none  Genetic testing: No  Ashkenazi Caodaism descent: No    Other History:     Past Medical History:   Diagnosis Date    Anxiety and depression 04/08/2023    Breast cancer     left    Dementia     Essential hypertension 04/08/2023    Mixed hyperlipidemia 04/08/2023    Osteopenia of multiple sites 04/08/2023    Type 2 diabetes mellitus with diabetic peripheral angiopathy without gangrene, with long-term current use of  insulin 04/08/2023        Past Surgical History:   Procedure Laterality Date    BREAST BIOPSY      EYE SURGERY  09-    GI obstruction N/A     HYSTERECTOMY      MASTECTOMY, PARTIAL Left 11/8/2024    Procedure: MASTECTOMY, PARTIAL // Left / Need Sentimag need US Need Faxitron Need sterile charms;  Surgeon: Allie Mena MD;  Location: [facility];  Service: General;  Laterality: Left;  Need Sentimag  need US  Need Faxitron  Need sterile charms    PHACOEMULSIFICATION, CATARACT, WITH IOL INSERTION Left 06/04/2024    Procedure: PHACOEMULSIFICATION, CATARACT, WITH IOL INSERTION;  Surgeon: Jose Daniel Villarreal MD;  Location: [facility] OR;  Service: Ophthalmology;  Laterality: Left;  3rd 0700    PHACOEMULSIFICATION, CATARACT, WITH IOL INSERTION Right 09/03/2024    Procedure: PHACOEMULSIFICATION, CATARACT, WITH IOL INSERTION;  Surgeon: Jose Daniel Villarreal MD;  Location: [facility] OR;  Service: Ophthalmology;  Laterality: Right;  2nd 0630    SENTINEL LYMPH NODE BIOPSY Left 11/8/2024    Procedure: BIOPSY, LYMPH NODE, SENTINEL // Left /need nuc med need gabrielle node;  Surgeon: Allie Mena MD;  Location: [facility];  Service: General;  Laterality: Left;  need nuc med  need gabrielle node    TUBAL LIGATION          Social History     Socioeconomic History    Marital status:     Number of children: 3   Occupational History    Occupation: Retired: [employer]   Tobacco Use    Smoking status: Never    Smokeless tobacco: Never   Substance and Sexual Activity    Alcohol use: Never    Drug use: Never    Sexual activity: Not Currently     Partners: Male     Birth control/protection: None     Social Drivers of Health     Financial Resource Strain: Low Risk  (5/8/2024)    Overall Financial Resource Strain (CARDIA)     Difficulty of Paying Living Expenses: Not hard at all   Food Insecurity: No Food Insecurity (5/8/2024)    Hunger Vital Sign     Worried About Running Out of Food in the Last Year: Never true     Ran Out of Food in the Last Year: Never  true   Transportation Needs: No Transportation Needs (5/8/2024)    TRANSPORTATION NEEDS     Transportation : No   Physical Activity: Sufficiently Active (5/8/2024)    Exercise Vital Sign     Days of Exercise per Week: 7 days     Minutes of Exercise per Session: 60 min   Stress: No Stress Concern Present (5/8/2024)    [place] [employer] - Occupational Stress Questionnaire     Feeling of Stress : Not at all   Housing Stability: Low Risk  (5/8/2024)    Housing Stability Vital Sign     Unable to Pay for Housing in the Last Year: No     Homeless in the Last Year: No        Immunization History   Administered Date(s) Administered    COVID-19 MRNA, LN-S PF (MODERNA HALF 0.25 ML DOSE) 04/19/2022    COVID-19, MRNA, LN-S, PF (Pfizer) (Purple Cap) 01/20/2021, 02/10/2021    Influenza - Quadrivalent - High Dose - PF (65 years and older) 10/06/2020, 10/03/2021, 10/31/2023    Influenza - Trivalent - Fluzone High Dose - PF (65 years and older) 08/20/2024    Influenza A (H1N1) 2009 Monovalent - IM 01/07/2010    Pneumococcal Conjugate - 20 Valent 02/19/2024, 02/20/2024    RSVpreF (Arexvy) 10/31/2023    Zoster Recombinant 09/18/2019, 10/17/2021        Medications/Allergies:       Current Outpatient Medications   Medication Instructions    amLODIPine (NORVASC) 5 mg, Oral, Daily    aspirin (ECOTRIN) 81 mg, Oral, Daily    blood sugar diagnostic Strp To check BG 3 times daily, to use with insurance preferred meter    blood-glucose meter kit To check BG 3 times daily, to use with insurance preferred meter    blood-glucose sensor (FREESTYLE NISHA 3 SENSOR) Suzanne 1 Device, Misc.(Non-Drug; Combo Route), Every 14 days    cloNIDine (CATAPRES) 0.1 mg, Oral, As needed (PRN)    donepeziL (ARICEPT) 5 mg, Oral, Every morning    [START ON 12/5/2024] donepeziL (ARICEPT) 10 mg, Oral, Every morning    ibuprofen (ADVIL,MOTRIN) 200 mg, Daily PRN    JARDIANCE 25 mg tablet 1 tablet Orally Once a day for 30 days    ketorolac 0.5%  "(ACULAR) 0.5 % Drop 1 drop, 4 times daily    lancets Misc To check BG 3 times daily, to use with insurance preferred meter    LANTUS SOLOSTAR U-100 INSULIN 100 unit/mL (3 mL) InPn pen INJECT 10 UNITS SUBCUTANEOUSLY ONCE DAILY    memantine (NAMENDA) 10 mg, Oral, Daily    moxifloxacin (VIGAMOX) 0.5 % ophthalmic solution 1 drop, 4 times daily    olmesartan (BENICAR) 40 mg, Oral, Daily    prednisoLONE acetate (PRED FORTE) 1 % DrpS 1 drop, 2 times daily    rosuvastatin (CRESTOR) 10 mg, Oral, Daily    sertraline (ZOLOFT) 50 mg, Oral, Daily    traMADoL (ULTRAM) 50 mg, Oral, Every 8 hours PRN       Review of patient's allergies indicates:  No Known Allergies     Review of Systems:      ROS  See HPI for pertinent ROS        Objective/Physical Exam   Visit Vitals  BP (!) 144/73 (BP Location: Right arm, Patient Position: Sitting)   Pulse 75   Temp 97 °F (36.1 °C) (Oral)   Resp 18   Ht 5' 3" (1.6 m)   Wt 71.9 kg (158 lb 9.6 oz)   SpO2 97%   BMI 28.09 kg/m²          Physical Exam     breast:  L BREAST Incision is clean, dry, intact. No erythema. Minimal swelling.   L AXILLA Incision is clean, dry, intact. No erythema. Minimal swelling.          Assessment and Plan     1. Malignant neoplasm of lower-outer quadrant of left breast of female, estrogen receptor positive      Tara Parker is a 79 y.o. female who presents to the [facility] postoperatively with left breast cancer who underwent a L partial mastectomy and SLNB. Pathology was reviewed with the patient and all questions were answered.  Will place referral to both medical and radiation oncology today for further treatment discussion.  All questions were answered.     Cancer Staging   Malignant neoplasm of lower-outer quadrant of left breast of female, estrogen receptor positive  Staging form: Breast, AJCC 8th Edition  - Clinical stage from 11/18/2024: Stage IA (cT1b, cN0(sn), cM0, G1, ER+, ID+, HER2-) - Signed by Allie Mena MD on 11/18/2024  - Pathologic stage from " 11/19/2024: Stage IA (pT1b, pN0(sn), cM0, G1, ER+, MI+, HER2-) - Signed by Allie Mena MD on 11/19/2024        Follow-up Care:  The patient will rotate visits with the breast team consisting of medical oncology, radiation oncology and the Breast Center to be seen by one of her breast cancer specialists every 3-6 months for the first 2 years after diagnosis, and then every 6 months for a total of 5 years, then yearly. A repeat left mammogram is due May 2025. She was asked to return for follow up February 2025 with the Breast Center.    Allie Mena MD  Breast Surgical Oncology

## 2024-12-04 ENCOUNTER — TUMOR BOARD CONFERENCE (OUTPATIENT)
Dept: SURGERY | Facility: CLINIC | Age: 79
End: 2024-12-04
Payer: MEDICARE

## 2024-12-04 NOTE — PROGRESS NOTES
A multidisciplinary Tumor Board meeting was held to consensually decide upon treatment recommendations for this breast cancer patient. The members present at the meeting included physicians representing breast radiology, surgical oncology, medical oncology, and radiation oncology.    Date of meetin/3/2024    Patient Name: Tara Parker  : 1945  Age: 79 y.o.  Sex: female      The patient's clinical history, staging, radiology, pathology, labs/biomarkers, and past treatments were discussed in detail. The following recommendations were made regarding further treatment:    Oncology History   Malignant neoplasm of lower-outer quadrant of left breast of female, estrogen receptor positive   2024 Initial Diagnosis    Malignant neoplasm of lower-outer quadrant of left breast of female, estrogen receptor positive     2024 Cancer Staged    Staging form: Breast, AJCC 8th Edition  - Clinical stage from 2024: Stage IA (cT1b, cN0(sn), cM0, G1, ER+, [place]+, HER2-)     2024 Cancer Staged    Staging form: Breast, AJCC 8th Edition  - Pathologic stage from 2024: Stage IA (pT1b, pN0(sn), cM0, G1, ER+, [place]+, HER2-)           Recommendations:  Surgery: No further surgical recommendations.    Radiation Oncology: Consider XRT.    Medical Oncology: Endocrine therapy recommended.     Notes/Comments: Consider genetic testing, as patient is a candidate.       Denae Negro PA-C

## 2024-12-11 ENCOUNTER — TELEPHONE (OUTPATIENT)
Dept: FAMILY MEDICINE | Facility: CLINIC | Age: 79
End: 2024-12-11
Payer: MEDICARE
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
## 2024-12-18 ENCOUNTER — TELEPHONE (OUTPATIENT)
Dept: FAMILY MEDICINE | Facility: CLINIC | Age: 79
End: 2024-12-18
Payer: MEDICARE

## 2024-12-18 NOTE — TELEPHONE ENCOUNTER
Shaka called regarding 's dementia. He states it is getting worse and would like to talk to you about getting her more help.

## 2024-12-26 ENCOUNTER — TELEPHONE (OUTPATIENT)
Dept: FAMILY MEDICINE | Facility: CLINIC | Age: 79
End: 2024-12-26
Payer: MEDICARE

## 2025-01-02 ENCOUNTER — TELEPHONE (OUTPATIENT)
Dept: NEUROLOGY | Facility: CLINIC | Age: 80
End: 2025-01-02
Payer: MEDICARE

## 2025-01-02 NOTE — TELEPHONE ENCOUNTER
Patient son states that mom is more emotional and paranoid. He think its the medication donepezil 10mg and namenda 10mg.  Shaka ( son) states memory is worsening where she's forgetting reality, logic and mixing events. Wants to know if there's anything else she can be given. Also would like a referral to  a psychology.    Shaka (son) [phone]

## 2025-01-08 ENCOUNTER — TELEPHONE (OUTPATIENT)
Dept: FAMILY MEDICINE | Facility: CLINIC | Age: 80
End: 2025-01-08
Payer: MEDICARE

## 2025-01-08 DIAGNOSIS — F03.B4 MODERATE DEMENTIA WITH ANXIETY, UNSPECIFIED DEMENTIA TYPE: ICD-10-CM

## 2025-01-08 DIAGNOSIS — F02.B3 MODERATE LATE ONSET ALZHEIMER'S DEMENTIA WITH MOOD DISTURBANCE: Primary | ICD-10-CM

## 2025-01-08 DIAGNOSIS — G30.1 MODERATE LATE ONSET ALZHEIMER'S DEMENTIA WITH MOOD DISTURBANCE: Primary | ICD-10-CM

## 2025-01-15 ENCOUNTER — OFFICE VISIT (OUTPATIENT)
Dept: FAMILY MEDICINE | Facility: CLINIC | Age: 80
End: 2025-01-15
Payer: MEDICARE

## 2025-01-15 VITALS
HEART RATE: 63 BPM | OXYGEN SATURATION: 100 % | RESPIRATION RATE: 18 BRPM | HEIGHT: 63 IN | BODY MASS INDEX: 27.4 KG/M2 | DIASTOLIC BLOOD PRESSURE: 80 MMHG | WEIGHT: 154.63 LBS | SYSTOLIC BLOOD PRESSURE: 140 MMHG

## 2025-01-15 DIAGNOSIS — E78.2 MIXED HYPERLIPIDEMIA: ICD-10-CM

## 2025-01-15 DIAGNOSIS — C50.512 MALIGNANT NEOPLASM OF LOWER-OUTER QUADRANT OF LEFT BREAST OF FEMALE, ESTROGEN RECEPTOR POSITIVE: ICD-10-CM

## 2025-01-15 DIAGNOSIS — T46.6X5A STATIN MYOPATHY: ICD-10-CM

## 2025-01-15 DIAGNOSIS — F32.A ANXIETY AND DEPRESSION: ICD-10-CM

## 2025-01-15 DIAGNOSIS — Z17.0 MALIGNANT NEOPLASM OF LOWER-OUTER QUADRANT OF LEFT BREAST OF FEMALE, ESTROGEN RECEPTOR POSITIVE: ICD-10-CM

## 2025-01-15 DIAGNOSIS — E11.51 TYPE 2 DIABETES MELLITUS WITH DIABETIC PERIPHERAL ANGIOPATHY WITHOUT GANGRENE, WITH LONG-TERM CURRENT USE OF INSULIN: ICD-10-CM

## 2025-01-15 DIAGNOSIS — G72.0 STATIN MYOPATHY: ICD-10-CM

## 2025-01-15 DIAGNOSIS — F41.9 ANXIETY AND DEPRESSION: ICD-10-CM

## 2025-01-15 DIAGNOSIS — F02.B0 MODERATE LATE ONSET ALZHEIMER'S DEMENTIA WITHOUT BEHAVIORAL DISTURBANCE, PSYCHOTIC DISTURBANCE, MOOD DISTURBANCE, OR ANXIETY: Primary | ICD-10-CM

## 2025-01-15 DIAGNOSIS — G30.1 MODERATE LATE ONSET ALZHEIMER'S DEMENTIA WITHOUT BEHAVIORAL DISTURBANCE, PSYCHOTIC DISTURBANCE, MOOD DISTURBANCE, OR ANXIETY: Primary | ICD-10-CM

## 2025-01-15 DIAGNOSIS — Z79.4 TYPE 2 DIABETES MELLITUS WITH DIABETIC PERIPHERAL ANGIOPATHY WITHOUT GANGRENE, WITH LONG-TERM CURRENT USE OF INSULIN: ICD-10-CM

## 2025-01-15 DIAGNOSIS — N18.32 STAGE 3B CHRONIC KIDNEY DISEASE: ICD-10-CM

## 2025-01-15 PROBLEM — N18.31 CHRONIC KIDNEY DISEASE, STAGE 3A: Status: RESOLVED | Noted: 2024-08-29 | Resolved: 2025-01-15

## 2025-01-15 PROCEDURE — 3077F SYST BP >= 140 MM HG: CPT | Mod: CPTII,,, | Performed by: FAMILY MEDICINE

## 2025-01-15 PROCEDURE — 1160F RVW MEDS BY RX/DR IN RCRD: CPT | Mod: CPTII,,, | Performed by: FAMILY MEDICINE

## 2025-01-15 PROCEDURE — G2211 COMPLEX E/M VISIT ADD ON: HCPCS | Mod: ,,, | Performed by: FAMILY MEDICINE

## 2025-01-15 PROCEDURE — 99214 OFFICE O/P EST MOD 30 MIN: CPT | Mod: ,,, | Performed by: FAMILY MEDICINE

## 2025-01-15 PROCEDURE — 1126F AMNT PAIN NOTED NONE PRSNT: CPT | Mod: CPTII,,, | Performed by: FAMILY MEDICINE

## 2025-01-15 PROCEDURE — 1101F PT FALLS ASSESS-DOCD LE1/YR: CPT | Mod: CPTII,,, | Performed by: FAMILY MEDICINE

## 2025-01-15 PROCEDURE — 1159F MED LIST DOCD IN RCRD: CPT | Mod: CPTII,,, | Performed by: FAMILY MEDICINE

## 2025-01-15 PROCEDURE — 3079F DIAST BP 80-89 MM HG: CPT | Mod: CPTII,,, | Performed by: FAMILY MEDICINE

## 2025-01-15 PROCEDURE — 3288F FALL RISK ASSESSMENT DOCD: CPT | Mod: CPTII,,, | Performed by: FAMILY MEDICINE

## 2025-01-15 RX ORDER — SERTRALINE HYDROCHLORIDE 50 MG/1
50 TABLET, FILM COATED ORAL DAILY
Qty: 90 TABLET | Refills: 1 | Status: SHIPPED | OUTPATIENT
Start: 2025-01-15 | End: 2025-07-14

## 2025-01-15 NOTE — PROGRESS NOTES
Patient ID: [id number]     Chief Complaint: Dementia      HPI:     Tara Parker is a 79 y.o. female here today with her son  for acute visit -is interested in obtaining home health  Patient with dementia -majority of history obtained from son-continuing to notice weakness-declining ability to complete ADLs-concerns about medications.  Feels that home health would be beneficial.  Patient needs orders for baseline labs.  Has not had any low blood sugars.   1) Hypertension: Patient has been taking medicine daily. BP is not consistently monitored No symptoms associated with increased BP such as headache/ visual changes/ dizziness/ chest pain.   2) Depression/Anxiety: Patient does not need prescription for Zoloft to be refilled-noticeable calmness with medication.  Son would like patient to be referred to a counselor-concerns regarding counseling with dementia discussed.      Past Medical History:   Diagnosis Date    Anxiety and depression 04/08/2023    Breast cancer     left    Dementia     Essential hypertension 04/08/2023    Mixed hyperlipidemia 04/08/2023    Osteopenia of multiple sites 04/08/2023    Type 2 diabetes mellitus with diabetic peripheral angiopathy without gangrene, with long-term current use of insulin 04/08/2023        Past Surgical History:   Procedure Laterality Date    BREAST BIOPSY      EYE SURGERY  09-    GI obstruction N/A     HYSTERECTOMY      MASTECTOMY, PARTIAL Left 11/8/2024    Procedure: MASTECTOMY, PARTIAL // Left / Need Sentimag need US Need Faxitron Need sterile charms;  Surgeon: Allie Mena MD;  Location: [facility] OR;  Service: General;  Laterality: Left;  Need Sentimag  need US  Need Faxitron  Need sterile charms    PHACOEMULSIFICATION, CATARACT, WITH IOL INSERTION Left 06/04/2024    Procedure: PHACOEMULSIFICATION, CATARACT, WITH IOL INSERTION;  Surgeon: Jose Daniel Villarreal MD;  Location: [facility] OR;  Service: Ophthalmology;  Laterality: Left;  3rd 0700    PHACOEMULSIFICATION,  CATARACT, WITH IOL INSERTION Right 09/03/2024    Procedure: PHACOEMULSIFICATION, CATARACT, WITH IOL INSERTION;  Surgeon: Jose Daniel Villarreal MD;  Location: [facility];  Service: Ophthalmology;  Laterality: Right;  2nd 0630    SENTINEL LYMPH NODE BIOPSY Left 11/8/2024    Procedure: BIOPSY, LYMPH NODE, SENTINEL // Left /need nuc med need gabrielle node;  Surgeon: Allie Mena MD;  Location: [facility];  Service: General;  Laterality: Left;  need nuc med  need gabrielle node    TUBAL LIGATION          Social History     Tobacco Use    Smoking status: Never    Smokeless tobacco: Never   Substance Use Topics    Alcohol use: Never    Drug use: Never        Social History     Socioeconomic History    Marital status:     Number of children: 3        Current Outpatient Medications   Medication Instructions    amLODIPine (NORVASC) 5 mg, Oral, Daily    aspirin (ECOTRIN) 81 mg, Oral, Daily    blood sugar diagnostic Strp To check BG 3 times daily, to use with insurance preferred meter    blood-glucose meter kit To check BG 3 times daily, to use with insurance preferred meter    blood-glucose sensor (FREESTYLE NISHA 3 SENSOR) Suzanne 1 Device, Misc.(Non-Drug; Combo Route), Every 14 days    cloNIDine (CATAPRES) 0.1 mg, Oral, As needed (PRN)    donepeziL (ARICEPT) 5 mg, Oral, Every morning    donepeziL (ARICEPT) 10 mg, Oral, Every morning    ibuprofen (ADVIL,MOTRIN) 200 mg, Daily PRN    JARDIANCE 25 mg tablet 1 tablet Orally Once a day for 30 days    ketorolac 0.5% (ACULAR) 0.5 % Drop 1 drop, 4 times daily    lancets Misc To check BG 3 times daily, to use with insurance preferred meter    LANTUS SOLOSTAR U-100 INSULIN 100 unit/mL (3 mL) InPn pen INJECT 10 UNITS SUBCUTANEOUSLY ONCE DAILY    memantine (NAMENDA) 10 mg, Oral, Daily    moxifloxacin (VIGAMOX) 0.5 % ophthalmic solution 1 drop, 4 times daily    olmesartan (BENICAR) 40 mg, Oral, Daily    prednisoLONE acetate (PRED FORTE) 1 % DrpS 1 drop, 2 times daily    rosuvastatin (CRESTOR) 10 mg,  "Oral, Daily    sertraline (ZOLOFT) 50 mg, Oral, Daily    traMADoL (ULTRAM) 50 mg, Oral, Every 8 hours PRN       Review of patient's allergies indicates:  No Known Allergies     Patient Care Team:  Lilliana De La Torre MD as PCP - General (Family Medicine)  Juan David Zapata MD as Consulting Physician (Neurology)  Roscoe Boyce MD as Consulting Physician (Cardiology)  Wendy Hussein RD as Diabetes Educator (Diabetes)  Allie Mena MD as Consulting Physician (Breast Surgery)       Subjective:     Review of Systems    12 point review of systems conducted, negative except as stated in the history of present illness. See HPI for details.      Objective:     Visit Vitals  BP (!) 140/80 (BP Location: Right arm, Patient Position: Sitting)   Pulse 63   Resp 18   Ht 5' 3" (1.6 m)   Wt 70.1 kg (154 lb 9.6 oz)   SpO2 100%   BMI 27.39 kg/m²       Physical Exam    General: Alert and oriented, No acute distress.  Head: Normocephalic, Atraumatic.  Eye: Pupils are equal, round and reactive to light, Extraocular movements are intact, Sclera non-icteric.  Ears/Nose/Throat: Normal, Mucosa moist,Clear.  Neck/Thyroid: Supple, Non-tender  Respiratory: Clear to auscultation bilaterally  Cardiovascular: Regular rate and rhythm, S1/S2 normal  Gastrointestinal: Soft, Non-tender, Non-distended, Normal bowel sounds, No palpable organomegaly.  Musculoskeletal: Normal range of motion.  Integumentary: Warm, Dry  Extremities:  Changes consistent with osteoarthritis  Protective Sensation (w/ 10 gram monofilament):  Right: Intact  Left: Intact    Visual Inspection:  Normal -  Bilateral    Pedal Pulses:   Right: Present  Left: Present    Posterior Tibialis Pulses:   Right:Present  Left: Present     Neurologic: No focal deficits, Cranial Nerves II-XII are grossly intact  Psychiatric: Normal interaction, Coherent speech       Assessment:       ICD-10-CM ICD-9-CM   1. Moderate late onset Alzheimer's dementia without behavioral disturbance, psychotic " disturbance, mood disturbance, or anxiety  G30.1 331.0    F02.B0 294.10   2. Anxiety and depression  F41.9 300.00    F32.A 311   3. Type 2 diabetes mellitus with diabetic peripheral angiopathy without gangrene, with long-term current use of insulin  E11.51 250.70    Z79.4 443.81     V58.67   4. Mixed hyperlipidemia  E78.2 272.2   5. Statin myopathy  G72.0 359.4    T46.6X5A E942.2   6. Stage 3b chronic kidney disease  N18.32 585.3   7. Malignant neoplasm of lower-outer quadrant of left breast of female, estrogen receptor positive  C50.512 174.5    Z17.0 V86.0        Plan:     1. Moderate late onset Alzheimer's dementia without behavioral disturbance, psychotic disturbance, mood disturbance, or anxiety (Primary)  Patient is comanage with Neurologist -no acute modifications or recommendations at this time.  Per family request referral for home health    - Ambulatory referral/consult to Home Health; Future    2. Anxiety and depression  Patient to continue Zoloft-per family request referral to counselor.  - sertraline (ZOLOFT) 50 MG tablet; Take 1 tablet (50 mg total) by mouth once daily.  Dispense: 90 tablet; Refill: 1  - Ambulatory referral/consult to Psychology; Future    3. Type 2 diabetes mellitus with diabetic peripheral angiopathy without gangrene, with long-term current use of insulin  Check studies management based upon results.  Pathophysiology/treatment/dangers discussed.   - Hemoglobin A1C; Future  - Microalbumin/Creatinine Ratio, Urine; Future  - Urinalysis, Reflex to Urine Culture; Future    4. Mixed hyperlipidemia  Check studies management based upon results.  Pathophysiology/treatment/dangers discussed.   - CBC Auto Differential; Future  - Comprehensive Metabolic Panel; Future  - Lipid Panel; Future  - TSH; Future    5. Statin myopathy  Patient had complained of musculoskeletal pain-weakness in the past-has been able to tolerate Crestor without any noticeable problems.  Continue to monitor    6. Stage 3b  chronic kidney disease  Pathophysiology/Treatment/ Dangers Discussed.  Maximize management of risk factors including type 2 diabetes, hypertension, and hydration.     7. Malignant neoplasm of lower-outer quadrant of left breast of female, estrogen receptor positive  Patient is comanage with specialists - continue management per recommendation.     Follow up in about 6 months (around 7/15/2025), or if symptoms worsen or fail to improve, for Medicare Wellness. In addition to their scheduled follow up, the patient has also been instructed to follow up on as needed basis.     Future Appointments   Date Time Provider Department Center   1/28/2025  2:20 PM Lejeune, Elizabeth Kennedy, MD [facility]   1/28/2025  3:00 PM PATIENT NAVIGATOR, [facility] HEMATOLOGY ONCOLOGY [facility]   2/20/2025  1:00 PM Allie Mena MD [facility] [address]   4/15/2025  1:00 PM Juan David Zapata MD [facility] [address]   7/16/2025 11:15 AM Lilliana De La Torre MD [facility] [place]        Lilliana De La Torre MD

## 2025-01-15 NOTE — PROGRESS NOTES
Subjective:       Patient ID: Tara Parker is a 79 y.o. female.    Chief Complaint: New Patient    Diagnosis: Stage IA Left Breast Cancer(Hormone Positive)    Current Treatment: None    Treatment History:   11/8/2024 L breast partial mastectomy and SLNB - Dr. 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 She had an abnormal screening MMG in August '24. Follow up imaging revealed an 9mm mass at the 5 o'clock position, 5CMFN. Biopsy of mass revealed invasive ductal carcinoma with mucinous features, grade 1, ER 94%, TX 93%, HER2 2+, FISH negative. She has dementia and her son is POA. She ultimately underwent left breast lumpectomy with Dr. Mena on 11/8/24 where final pathology showed 10mm IDC, G1, negative margins 0/2 lymph nodes involved with metastatic cancer. She has seen radiation and ultimately decided not to proceed with XRT therapy. She presents to medical oncology for further evaluation with her son, POA.     I discussed her diagnosis, stage, prognosis, and referenced NCCN guidelines when discussing her treatment recommendations moving forward.     The mechanism, benefits, and side effect profile of Letrozole was reviewed with patient. The benefits described include reduced risk of contralateral breast cancer and reduced risk of distant metastatic disease. Side effects discussed include joint pain, hot flashes, vaginal dryness, arthralgias and loss of bone mineral density. The need for DEXA scan to monitor bone mineral density as well as supplemental calcium and vitamin D were discussed.    Past Medical History:   Diagnosis Date    Anxiety and depression 04/08/2023    Breast cancer     left    Dementia     Essential hypertension 04/08/2023    Mixed hyperlipidemia 04/08/2023    Osteopenia of multiple sites 04/08/2023    Type 2 diabetes mellitus with diabetic peripheral angiopathy without gangrene, with long-term current use of insulin  04/08/2023      Past Surgical History:   Procedure Laterality Date    BREAST BIOPSY      EYE SURGERY  09-    GI obstruction N/A     HYSTERECTOMY      MASTECTOMY, PARTIAL Left 11/8/2024    Procedure: MASTECTOMY, PARTIAL // Left / Need Sentimag need US Need Faxitron Need sterile charms;  Surgeon: Allie Mena MD;  Location: [facility];  Service: General;  Laterality: Left;  Need Sentimag  need US  Need Faxitron  Need sterile charms    PHACOEMULSIFICATION, CATARACT, WITH IOL INSERTION Left 06/04/2024    Procedure: PHACOEMULSIFICATION, CATARACT, WITH IOL INSERTION;  Surgeon: Jose Daniel Villarreal MD;  Location: [facility] OR;  Service: Ophthalmology;  Laterality: Left;  3rd 0700    PHACOEMULSIFICATION, CATARACT, WITH IOL INSERTION Right 09/03/2024    Procedure: PHACOEMULSIFICATION, CATARACT, WITH IOL INSERTION;  Surgeon: Jose Daniel Villarreal MD;  Location: [facility] OR;  Service: Ophthalmology;  Laterality: Right;  2nd 0630    SENTINEL LYMPH NODE BIOPSY Left 11/8/2024    Procedure: BIOPSY, LYMPH NODE, SENTINEL // Left /need nuc med need gabrielle node;  Surgeon: Allie Mena MD;  Location: [facility];  Service: General;  Laterality: Left;  need nuc med  need gabrielle node    TUBAL LIGATION       Social History     Socioeconomic History    Marital status:     Number of children: 3   Occupational History    Occupation: Retired: [employer]   Tobacco Use    Smoking status: Never    Smokeless tobacco: Never   Substance and Sexual Activity    Alcohol use: Never    Drug use: Never    Sexual activity: Not Currently     Partners: Male     Birth control/protection: None     Social Drivers of Health     Financial Resource Strain: Low Risk  (5/8/2024)    Overall Financial Resource Strain (CARDIA)     Difficulty of Paying Living Expenses: Not hard at all   Food Insecurity: No Food Insecurity (5/8/2024)    Hunger Vital Sign     Worried About Running Out of Food in the Last Year: Never true     Ran Out of Food in the Last Year: Never true    Transportation Needs: No Transportation Needs (5/8/2024)    TRANSPORTATION NEEDS     Transportation : No   Physical Activity: Sufficiently Active (5/8/2024)    Exercise Vital Sign     Days of Exercise per Week: 7 days     Minutes of Exercise per Session: 60 min   Stress: No Stress Concern Present (5/8/2024)    [employer] - Occupational Stress Questionnaire     Feeling of Stress : Not at all   Housing Stability: Low Risk  (5/8/2024)    Housing Stability Vital Sign     Unable to Pay for Housing in the Last Year: No     Homeless in the Last Year: No      Family History   Problem Relation Name Age of Onset    Coronary artery disease Mother Ferdinand Bazan. 85    Breast cancer Mother Ferdinand Bazan.     Cancer Mother Ferdinand Bzaan.     Diabetes Mother Ferdinand Bazan.     Arthritis Mother Ferdinand Bazan.     Lung disease Father  77        Chronic Obstructive Lung Disease    Depression Son Shaka gudino jr       Review of patient's allergies indicates:  No Known Allergies   Review of Systems   Constitutional:  Negative for activity change, fever and unexpected weight change.   HENT:  Negative for sore throat.    Eyes:  Negative for visual disturbance.   Respiratory:  Negative for cough and shortness of breath.    Cardiovascular:  Negative for chest pain.   Gastrointestinal:  Negative for abdominal pain, diarrhea, nausea and vomiting.   Endocrine: Negative for polyuria.   Genitourinary:  Negative for dysuria and hot flashes.   Integumentary:  Negative for rash.   Neurological:  Negative for weakness and headaches.   Hematological:  Negative for adenopathy.   Psychiatric/Behavioral:  Negative for confusion.          Objective:      Vitals:    01/28/25 1358   BP: 121/74   Pulse: 74   Resp: 18   Temp: 97.8 °F (36.6 °C)       Physical Exam  Constitutional:       General: She is not in acute distress.     Appearance: Normal appearance. She is not ill-appearing.   HENT:      Head: Normocephalic and  atraumatic.      Nose: Nose normal.      Mouth/Throat:      Mouth: Mucous membranes are moist.      Pharynx: Oropharynx is clear.   Eyes:      Extraocular Movements: Extraocular movements intact.      Conjunctiva/sclera: Conjunctivae normal.      Pupils: Pupils are equal, round, and reactive to light.   Cardiovascular:      Rate and Rhythm: Normal rate and regular rhythm.      Pulses: Normal pulses.      Heart sounds: Normal heart sounds. No murmur heard.  Pulmonary:      Effort: Pulmonary effort is normal. No respiratory distress.      Breath sounds: Normal breath sounds.   Abdominal:      General: There is no distension.      Palpations: Abdomen is soft.      Tenderness: There is no abdominal tenderness.   Musculoskeletal:         General: Normal range of motion.      Cervical back: Normal range of motion and neck supple.      Right lower leg: No edema.      Left lower leg: No edema.   Lymphadenopathy:      Cervical: No cervical adenopathy.   Skin:     General: Skin is warm and dry.   Neurological:      General: No focal deficit present.      Mental Status: She is alert and oriented to person, place, and time.         LABS AND IMAGING REVIEWED IN EPIC    Imaging:   Bilateral screening mammogram 08/29/2024:  Density B.  BI-RADS 0.  Mass in the left breast at 6:00, posterior depth.  Asymmetry in the left breast lateral region middle depth on CC view.  No suspicious findings in the right breast.  Left diagnostic mammogram 09/05/2024:  Density B.  BI-RADS 5.  Central inferior left breast, posterior depth there is a persistent 0.8 cm mass with spiculated margins.  On ultrasound this measures 0.8 x 0.3 x 0.5 cm.  In the 4:00 left breast, 2 cm from the nipple there is a 0.4 cm benign complicated cyst.    Ultrasound of the left axilla demonstrates no significant left axillary adenopathy.  Ultrasound-guided biopsy left breast 09/30/2024:  Mass at 5:00, 5 cm from the nipple: Successful.  Coil clip placed.  Good position.        8/29/24 Dexa:  Impression:  Osteopenia        Pathology:   Left breast 5:00, 5 cm from nipple mass core biopsy 09/30/2024: Invasive ductal carcinoma with mucinous features, well differentiated, grade 1, ER 93%, LA 93%, HER2 2+, fish negative.  L partial mastectomy and SLNB 11/8/24: IDC with partial mucinous features, G1, 10mm.  Margins negative. Closest margin posterior at 6mm. Two lymph nodes removed and both negative for tumor.     Assessment:   Stage 1 Left Breast Cancer - pT1bN0. G1. S/p lumpectomy 11/24. No role for adjuvant XRT. Plan to proceed with AI therapy for 5 years.     Dexa due: Aug '26  Plan:   - Discussed benefits, risks, and side effects of Letrozole   - Labs today   - Rx Letrozole 2.5mg PO Daily; sent to pharmacy today  - Rx Vitamin D supplementation 2000 - 5000 units daily  - RTC 6 weeks for NP visit, labs same day    I spent a total of 60 minutes on the day of the visit.This includes face to face time and non-face to face time preparing to see the patient (eg, review of tests), obtaining and/or reviewing separately obtained history, documenting clinical information in the electronic or other health record, independently interpreting results and communicating results to the patient/family/caregiver, or care coordinator.    Visit today included increased complexity associated with the care of the episodic problem breast cancer, addressing and managing the longitudinal care of the patient's Stage 1 Left Breast Cancer.     Elizabeth Kennedy LeJeune, MD  Hematology/Oncology   [facility]      Professional Services   I, Zuri Weaver LPN, acted solely as a scribe for and in the presence of Dr. Elizabeth Kennedy LeJeune, who performed these services.

## 2025-01-24 DIAGNOSIS — I10 ESSENTIAL HYPERTENSION: ICD-10-CM

## 2025-01-27 ENCOUNTER — TELEPHONE (OUTPATIENT)
Dept: HEMATOLOGY/ONCOLOGY | Facility: CLINIC | Age: 80
End: 2025-01-27
Payer: MEDICARE

## 2025-01-27 RX ORDER — CLONIDINE HYDROCHLORIDE 0.1 MG/1
0.1 TABLET ORAL
Qty: 60 TABLET | Refills: 1 | Status: SHIPPED | OUTPATIENT
Start: 2025-01-27

## 2025-01-27 NOTE — TELEPHONE ENCOUNTER
Spoke with patient's son regarding new patient appointment 1/28/25 with Dr. Lejeune. Patient denied concerns or questions at this time. Confirmed appointment date, time and location with patient.

## 2025-01-28 ENCOUNTER — OFFICE VISIT (OUTPATIENT)
Dept: HEMATOLOGY/ONCOLOGY | Facility: CLINIC | Age: 80
End: 2025-01-28
Payer: MEDICARE

## 2025-01-28 VITALS
OXYGEN SATURATION: 98 % | WEIGHT: 154.13 LBS | HEART RATE: 74 BPM | DIASTOLIC BLOOD PRESSURE: 74 MMHG | HEIGHT: 63 IN | BODY MASS INDEX: 27.31 KG/M2 | SYSTOLIC BLOOD PRESSURE: 121 MMHG | TEMPERATURE: 98 F | RESPIRATION RATE: 18 BRPM

## 2025-01-28 DIAGNOSIS — C50.512 MALIGNANT NEOPLASM OF LOWER-OUTER QUADRANT OF LEFT BREAST OF FEMALE, ESTROGEN RECEPTOR POSITIVE: ICD-10-CM

## 2025-01-28 DIAGNOSIS — M85.89 OSTEOPENIA OF MULTIPLE SITES: Primary | ICD-10-CM

## 2025-01-28 DIAGNOSIS — C50.912 INVASIVE DUCTAL CARCINOMA OF BREAST, FEMALE, LEFT: ICD-10-CM

## 2025-01-28 DIAGNOSIS — Z17.0 MALIGNANT NEOPLASM OF LOWER-OUTER QUADRANT OF LEFT BREAST OF FEMALE, ESTROGEN RECEPTOR POSITIVE: ICD-10-CM

## 2025-01-28 LAB
ALBUMIN SERPL-MCNC: 3.4 G/DL (ref 3.4–4.8)
ALBUMIN/GLOB SERPL: 1 RATIO (ref 1.1–2)
ALP SERPL-CCNC: 115 UNIT/L (ref 40–150)
ALT SERPL-CCNC: 9 UNIT/L (ref 0–55)
ANION GAP SERPL CALC-SCNC: 6 MEQ/L
AST SERPL-CCNC: 18 UNIT/L (ref 5–34)
BASOPHILS # BLD AUTO: 0.04 X10(3)/MCL
BASOPHILS NFR BLD AUTO: 0.8 %
BILIRUB SERPL-MCNC: 0.3 MG/DL
BUN SERPL-MCNC: 22.1 MG/DL (ref 9.8–20.1)
CALCIUM SERPL-MCNC: 9.2 MG/DL (ref 8.4–10.2)
CHLORIDE SERPL-SCNC: 107 MMOL/L (ref 98–107)
CO2 SERPL-SCNC: 27 MMOL/L (ref 23–31)
CREAT SERPL-MCNC: 1.2 MG/DL (ref 0.55–1.02)
CREAT/UREA NIT SERPL: 18
EOSINOPHIL # BLD AUTO: 0.14 X10(3)/MCL (ref 0–0.9)
EOSINOPHIL NFR BLD AUTO: 2.7 %
ERYTHROCYTE [DISTWIDTH] IN BLOOD BY AUTOMATED COUNT: 14.9 % (ref 11.5–17)
GFR SERPLBLD CREATININE-BSD FMLA CKD-EPI: 46 ML/MIN/1.73/M2
GLOBULIN SER-MCNC: 3.5 GM/DL (ref 2.4–3.5)
GLUCOSE SERPL-MCNC: 215 MG/DL (ref 82–115)
HCT VFR BLD AUTO: 34.4 % (ref 37–47)
HGB BLD-MCNC: 10.9 G/DL (ref 12–16)
IMM GRANULOCYTES # BLD AUTO: 0 X10(3)/MCL (ref 0–0.04)
IMM GRANULOCYTES NFR BLD AUTO: 0 %
LYMPHOCYTES # BLD AUTO: 2.52 X10(3)/MCL (ref 0.6–4.6)
LYMPHOCYTES NFR BLD AUTO: 47.8 %
MCH RBC QN AUTO: 26.5 PG (ref 27–31)
MCHC RBC AUTO-ENTMCNC: 31.7 G/DL (ref 33–36)
MCV RBC AUTO: 83.5 FL (ref 80–94)
MONOCYTES # BLD AUTO: 0.6 X10(3)/MCL (ref 0.1–1.3)
MONOCYTES NFR BLD AUTO: 11.4 %
NEUTROPHILS # BLD AUTO: 1.97 X10(3)/MCL (ref 2.1–9.2)
NEUTROPHILS NFR BLD AUTO: 37.3 %
PLATELET # BLD AUTO: 226 X10(3)/MCL (ref 130–400)
PMV BLD AUTO: 9.5 FL (ref 7.4–10.4)
POTASSIUM SERPL-SCNC: 4.4 MMOL/L (ref 3.5–5.1)
PROT SERPL-MCNC: 6.9 GM/DL (ref 5.8–7.6)
RBC # BLD AUTO: 4.12 X10(6)/MCL (ref 4.2–5.4)
SODIUM SERPL-SCNC: 140 MMOL/L (ref 136–145)
WBC # BLD AUTO: 5.27 X10(3)/MCL (ref 4.5–11.5)

## 2025-01-28 PROCEDURE — 36415 COLL VENOUS BLD VENIPUNCTURE: CPT | Performed by: STUDENT IN AN ORGANIZED HEALTH CARE EDUCATION/TRAINING PROGRAM

## 2025-01-28 PROCEDURE — 1126F AMNT PAIN NOTED NONE PRSNT: CPT | Mod: CPTII,S$GLB,, | Performed by: STUDENT IN AN ORGANIZED HEALTH CARE EDUCATION/TRAINING PROGRAM

## 2025-01-28 PROCEDURE — G2211 COMPLEX E/M VISIT ADD ON: HCPCS | Mod: S$GLB,,, | Performed by: STUDENT IN AN ORGANIZED HEALTH CARE EDUCATION/TRAINING PROGRAM

## 2025-01-28 PROCEDURE — 1159F MED LIST DOCD IN RCRD: CPT | Mod: CPTII,S$GLB,, | Performed by: STUDENT IN AN ORGANIZED HEALTH CARE EDUCATION/TRAINING PROGRAM

## 2025-01-28 PROCEDURE — 99999 PR PBB SHADOW E&M-EST. PATIENT-LVL V: CPT | Mod: PBBFAC,,, | Performed by: STUDENT IN AN ORGANIZED HEALTH CARE EDUCATION/TRAINING PROGRAM

## 2025-01-28 PROCEDURE — 3074F SYST BP LT 130 MM HG: CPT | Mod: CPTII,S$GLB,, | Performed by: STUDENT IN AN ORGANIZED HEALTH CARE EDUCATION/TRAINING PROGRAM

## 2025-01-28 PROCEDURE — 99205 OFFICE O/P NEW HI 60 MIN: CPT | Mod: S$GLB,,, | Performed by: STUDENT IN AN ORGANIZED HEALTH CARE EDUCATION/TRAINING PROGRAM

## 2025-01-28 PROCEDURE — 1160F RVW MEDS BY RX/DR IN RCRD: CPT | Mod: CPTII,S$GLB,, | Performed by: STUDENT IN AN ORGANIZED HEALTH CARE EDUCATION/TRAINING PROGRAM

## 2025-01-28 PROCEDURE — 85025 COMPLETE CBC W/AUTO DIFF WBC: CPT | Performed by: STUDENT IN AN ORGANIZED HEALTH CARE EDUCATION/TRAINING PROGRAM

## 2025-01-28 PROCEDURE — 3078F DIAST BP <80 MM HG: CPT | Mod: CPTII,S$GLB,, | Performed by: STUDENT IN AN ORGANIZED HEALTH CARE EDUCATION/TRAINING PROGRAM

## 2025-01-28 PROCEDURE — 80053 COMPREHEN METABOLIC PANEL: CPT | Performed by: STUDENT IN AN ORGANIZED HEALTH CARE EDUCATION/TRAINING PROGRAM

## 2025-01-28 RX ORDER — CHOLECALCIFEROL (VITAMIN D3) 50 MCG
2000 TABLET ORAL DAILY
Qty: 30 EACH | Refills: 2 | Status: SHIPPED | OUTPATIENT
Start: 2025-01-28

## 2025-01-28 RX ORDER — LETROZOLE 2.5 MG/1
2.5 TABLET, FILM COATED ORAL DAILY
Qty: 30 TABLET | Refills: 2 | Status: SHIPPED | OUTPATIENT
Start: 2025-01-28 | End: 2026-01-28

## 2025-01-30 ENCOUNTER — LAB VISIT (OUTPATIENT)
Dept: LAB | Facility: HOSPITAL | Age: 80
End: 2025-01-30
Attending: FAMILY MEDICINE
Payer: MEDICARE

## 2025-01-30 DIAGNOSIS — Z79.4 TYPE 2 DIABETES MELLITUS WITH DIABETIC PERIPHERAL ANGIOPATHY WITHOUT GANGRENE, WITH LONG-TERM CURRENT USE OF INSULIN: ICD-10-CM

## 2025-01-30 DIAGNOSIS — E78.2 MIXED HYPERLIPIDEMIA: ICD-10-CM

## 2025-01-30 DIAGNOSIS — E11.51 TYPE 2 DIABETES MELLITUS WITH DIABETIC PERIPHERAL ANGIOPATHY WITHOUT GANGRENE, WITH LONG-TERM CURRENT USE OF INSULIN: ICD-10-CM

## 2025-01-30 LAB
ALBUMIN SERPL-MCNC: 3.8 G/DL (ref 3.4–4.8)
ALBUMIN/GLOB SERPL: 1.2 RATIO (ref 1.1–2)
ALP SERPL-CCNC: 118 UNIT/L (ref 40–150)
ALT SERPL-CCNC: 11 UNIT/L (ref 0–55)
ANION GAP SERPL CALC-SCNC: 9 MEQ/L
AST SERPL-CCNC: 21 UNIT/L (ref 5–34)
BACTERIA #/AREA URNS AUTO: ABNORMAL /HPF
BASOPHILS # BLD AUTO: 0.06 X10(3)/MCL
BASOPHILS NFR BLD AUTO: 1.1 %
BILIRUB SERPL-MCNC: 0.5 MG/DL
BILIRUB UR QL STRIP.AUTO: NEGATIVE
BUN SERPL-MCNC: 21.8 MG/DL (ref 9.8–20.1)
CALCIUM SERPL-MCNC: 9.5 MG/DL (ref 8.4–10.2)
CHLORIDE SERPL-SCNC: 106 MMOL/L (ref 98–107)
CHOLEST SERPL-MCNC: 260 MG/DL
CHOLEST/HDLC SERPL: 5 {RATIO} (ref 0–5)
CLARITY UR: CLEAR
CO2 SERPL-SCNC: 26 MMOL/L (ref 23–31)
COLOR UR AUTO: YELLOW
CREAT SERPL-MCNC: 1.09 MG/DL (ref 0.55–1.02)
CREAT UR-MCNC: 182.4 MG/DL (ref 45–106)
CREAT/UREA NIT SERPL: 20
EOSINOPHIL # BLD AUTO: 0.14 X10(3)/MCL (ref 0–0.9)
EOSINOPHIL NFR BLD AUTO: 2.5 %
ERYTHROCYTE [DISTWIDTH] IN BLOOD BY AUTOMATED COUNT: 15.4 % (ref 11.5–17)
EST. AVERAGE GLUCOSE BLD GHB EST-MCNC: 200.1 MG/DL
GFR SERPLBLD CREATININE-BSD FMLA CKD-EPI: 52 ML/MIN/1.73/M2
GLOBULIN SER-MCNC: 3.1 GM/DL (ref 2.4–3.5)
GLUCOSE SERPL-MCNC: 96 MG/DL (ref 82–115)
GLUCOSE UR QL STRIP: ABNORMAL
HBA1C MFR BLD: 8.6 %
HCT VFR BLD AUTO: 35.6 % (ref 37–47)
HDLC SERPL-MCNC: 53 MG/DL (ref 35–60)
HGB BLD-MCNC: 11.2 G/DL (ref 12–16)
HGB UR QL STRIP: NEGATIVE
HYALINE CASTS #/AREA URNS LPF: ABNORMAL /LPF
IMM GRANULOCYTES # BLD AUTO: 0.02 X10(3)/MCL (ref 0–0.04)
IMM GRANULOCYTES NFR BLD AUTO: 0.4 %
KETONES UR QL STRIP: NEGATIVE
LDLC SERPL CALC-MCNC: 188 MG/DL (ref 50–140)
LEUKOCYTE ESTERASE UR QL STRIP: 250
LYMPHOCYTES # BLD AUTO: 2.43 X10(3)/MCL (ref 0.6–4.6)
LYMPHOCYTES NFR BLD AUTO: 44 %
MCH RBC QN AUTO: 26.4 PG (ref 27–31)
MCHC RBC AUTO-ENTMCNC: 31.5 G/DL (ref 33–36)
MCV RBC AUTO: 83.8 FL (ref 80–94)
MICROALBUMIN UR-MCNC: 471.8 UG/ML
MICROALBUMIN/CREAT RATIO PNL UR: 258.7 MG/GM CR (ref 0–30)
MONOCYTES # BLD AUTO: 0.64 X10(3)/MCL (ref 0.1–1.3)
MONOCYTES NFR BLD AUTO: 11.6 %
MUCOUS THREADS URNS QL MICRO: ABNORMAL /LPF
NEUTROPHILS # BLD AUTO: 2.23 X10(3)/MCL (ref 2.1–9.2)
NEUTROPHILS NFR BLD AUTO: 40.4 %
NITRITE UR QL STRIP: NEGATIVE
NRBC BLD AUTO-RTO: 0 %
PH UR STRIP: 5.5 [PH]
PLATELET # BLD AUTO: 224 X10(3)/MCL (ref 130–400)
PMV BLD AUTO: 10.2 FL (ref 7.4–10.4)
POTASSIUM SERPL-SCNC: 4.4 MMOL/L (ref 3.5–5.1)
PROT SERPL-MCNC: 6.9 GM/DL (ref 5.8–7.6)
PROT UR QL STRIP: ABNORMAL
RBC # BLD AUTO: 4.25 X10(6)/MCL (ref 4.2–5.4)
RBC #/AREA URNS AUTO: ABNORMAL /HPF
SODIUM SERPL-SCNC: 141 MMOL/L (ref 136–145)
SP GR UR STRIP.AUTO: 1.02 (ref 1–1.03)
SQUAMOUS #/AREA URNS LPF: ABNORMAL /HPF
TRIGL SERPL-MCNC: 96 MG/DL (ref 37–140)
TSH SERPL-ACNC: 2.08 UIU/ML (ref 0.35–4.94)
UROBILINOGEN UR STRIP-ACNC: NORMAL
VLDLC SERPL CALC-MCNC: 19 MG/DL
WBC # BLD AUTO: 5.52 X10(3)/MCL (ref 4.5–11.5)
WBC #/AREA URNS AUTO: ABNORMAL /HPF

## 2025-01-30 PROCEDURE — 83036 HEMOGLOBIN GLYCOSYLATED A1C: CPT

## 2025-01-30 PROCEDURE — 82570 ASSAY OF URINE CREATININE: CPT

## 2025-01-30 PROCEDURE — 85025 COMPLETE CBC W/AUTO DIFF WBC: CPT

## 2025-01-30 PROCEDURE — 80053 COMPREHEN METABOLIC PANEL: CPT

## 2025-01-30 PROCEDURE — 84443 ASSAY THYROID STIM HORMONE: CPT

## 2025-01-30 PROCEDURE — 36415 COLL VENOUS BLD VENIPUNCTURE: CPT

## 2025-01-30 PROCEDURE — 80061 LIPID PANEL: CPT

## 2025-01-30 PROCEDURE — 81001 URINALYSIS AUTO W/SCOPE: CPT

## 2025-02-03 ENCOUNTER — TELEPHONE (OUTPATIENT)
Dept: FAMILY MEDICINE | Facility: CLINIC | Age: 80
End: 2025-02-03
Payer: MEDICARE

## 2025-02-03 NOTE — TELEPHONE ENCOUNTER
----- Message from Lilliana De La Torre MD sent at 1/30/2025  1:53 PM CST -----  Patient needs appointment to discuss test results

## 2025-02-04 NOTE — PROGRESS NOTES
Patient ID: [id number]     Chief Complaint:  Type 2 diabetes    HPI:     Tara Parker is a 79 y.o. female here today with her son follow up:   Patient did have an appointment with oncologist yesterday.  1) Type 2 DM: Patient's blood sugars has been regularly monitored with freestyle Justyna-son was not giving insulin when blood sugar in the morning was low-usually occurring 1 to 2 times a week-now recognizes that long-acting insulin has to be given daily.   2) Hypertension: Patient has been taking medicine-blood pressure is consistently monitored at home-on occasions elevated-patient does use clonidine as needed.  3) Hyperlipidemia:  Son was not giving patient cholesterol medication-concerns about dementia associated with cholesterol medicine-after reviewing labs he has restarted the medication.        Past Medical History:   Diagnosis Date    Anxiety and depression 04/08/2023    Dementia     Essential hypertension 04/08/2023    Mixed hyperlipidemia 04/08/2023    Osteopenia of multiple sites 04/08/2023    Type 2 diabetes mellitus with diabetic peripheral angiopathy without gangrene, with long-term current use of insulin 04/08/2023        Past Surgical History:   Procedure Laterality Date    BREAST BIOPSY      EYE SURGERY  09-    GI obstruction N/A     HYSTERECTOMY      MASTECTOMY, PARTIAL Left 11/8/2024    Procedure: MASTECTOMY, PARTIAL // Left / Need Sentimag need US Need Faxitron Need sterile charms;  Surgeon: Allie Mena MD;  Location: [facility] OR;  Service: General;  Laterality: Left;  Need Sentimag  need US  Need Faxitron  Need sterile charms    PHACOEMULSIFICATION, CATARACT, WITH IOL INSERTION Left 06/04/2024    Procedure: PHACOEMULSIFICATION, CATARACT, WITH IOL INSERTION;  Surgeon: Jose Daniel Villarreal MD;  Location: [facility] OR;  Service: Ophthalmology;  Laterality: Left;  3rd 0700    PHACOEMULSIFICATION, CATARACT, WITH IOL INSERTION Right 09/03/2024    Procedure: PHACOEMULSIFICATION, CATARACT, WITH IOL  INSERTION;  Surgeon: Jose Daniel Villarreal MD;  Location: [facility] OR;  Service: Ophthalmology;  Laterality: Right;  2nd 0630    SENTINEL LYMPH NODE BIOPSY Left 11/8/2024    Procedure: BIOPSY, LYMPH NODE, SENTINEL // Left /need nuc med need gabrielle node;  Surgeon: Allie Mena MD;  Location: [facility] OR;  Service: General;  Laterality: Left;  need nuc med  need gabrielle node    TUBAL LIGATION          Social History     Tobacco Use    Smoking status: Never    Smokeless tobacco: Never   Substance Use Topics    Alcohol use: Never    Drug use: Never        Social History     Socioeconomic History    Marital status:     Number of children: 3        Current Outpatient Medications   Medication Instructions    amLODIPine (NORVASC) 5 mg, Oral, Daily    aspirin (ECOTRIN) 81 mg, Oral, Daily    blood sugar diagnostic Strp To check BG 3 times daily, to use with insurance preferred meter    blood-glucose meter kit To check BG 3 times daily, to use with insurance preferred meter    blood-glucose sensor (FREESTYLE NISHA 3 SENSOR) Suzanne 1 Device, Misc.(Non-Drug; Combo Route), Every 14 days    cholecalciferol (vitamin D3) (VITAMIN D3) 2,000 Units, Oral, Daily    cloNIDine (CATAPRES) 0.1 mg, Oral, As needed (PRN)    donepeziL (ARICEPT) 5 mg, Oral, Every morning    donepeziL (ARICEPT) 10 mg, Oral, Every morning    ibuprofen (ADVIL,MOTRIN) 200 mg, Daily PRN    JARDIANCE 25 mg tablet 1 tablet Orally Once a day for 30 days    ketorolac 0.5% (ACULAR) 0.5 % Drop 1 drop, 4 times daily    lancets Misc To check BG 3 times daily, to use with insurance preferred meter    LANTUS SOLOSTAR U-100 INSULIN 100 unit/mL (3 mL) InPn pen INJECT 10 UNITS SUBCUTANEOUSLY ONCE DAILY    letrozole (FEMARA) 2.5 mg, Oral, Daily    memantine (NAMENDA) 10 mg, Oral, Daily    moxifloxacin (VIGAMOX) 0.5 % ophthalmic solution 1 drop, 4 times daily    olmesartan (BENICAR) 40 mg, Oral, Daily    prednisoLONE acetate (PRED FORTE) 1 % DrpS 1 drop, 2 times daily    rosuvastatin  "(CRESTOR) 10 mg, Oral, Daily    sertraline (ZOLOFT) 50 mg, Oral, Daily    traMADoL (ULTRAM) 50 mg, Oral, Every 8 hours PRN       Review of patient's allergies indicates:  No Known Allergies     Patient Care Team:  Lilliana De La Torre MD as PCP - General (Family Medicine)  Juan David Zapata MD as Consulting Physician (Neurology)  Roscoe Boyce MD as Consulting Physician (Cardiology)  Wendy Hussein RD as Diabetes Educator (Diabetes)  Allie Mena MD as Consulting Physician (Breast Surgery)       Subjective:     Review of Systems    12 point review of systems conducted, negative except as stated in the history of present illness. See HPI for details.      Objective:     Visit Vitals  BP (!) 140/80 (BP Location: Left arm, Patient Position: Sitting)   Pulse 71   Resp 16   Ht 5' 3" (1.6 m)   Wt 69.1 kg (152 lb 6.4 oz)   SpO2 97%   BMI 27.00 kg/m²       Physical Exam    General: Alert and oriented, No acute distress.  Head: Normocephalic, Atraumatic.  Eye: Pupils are equal, round and reactive to light, Extraocular movements are intact, Sclera non-icteric.  Ears/Nose/Throat: Normal, Mucosa moist,Clear.  Neck/Thyroid: Supple, Non-tender  Respiratory: Clear to auscultation bilaterally  Cardiovascular: S1/S2 normal  Gastrointestinal: Soft, Non-tender, Non-distended, Normal bowel sounds, No palpable organomegaly.  Musculoskeletal: Normal range of motion.  Integumentary: Warm, Dry  Extremities: No clubbing, cyanosis or edema  Neurologic: No focal deficits, Cranial Nerves II-XII are grossly intact  Psychiatric: Normal interaction, Coherent speech       Assessment:       ICD-10-CM ICD-9-CM   1. Type 2 diabetes mellitus with diabetic peripheral angiopathy without gangrene, with long-term current use of insulin  E11.51 250.70    Z79.4 443.81     V58.67   2. Essential hypertension  I10 401.9   3. Mixed hyperlipidemia  E78.2 272.2   4. Anxiety and depression  F41.9 300.00    F32.A 311   5. Stage 3b chronic kidney disease  N18.32 " 585.3        Plan:       1. Type 2 diabetes mellitus with diabetic peripheral angiopathy without gangrene, with long-term current use of insulin (Primary)  Lab Results   Component Value Date    HGBA1C 8.6 (H) 01/30/2025    HGBA1C 7.6 (H) 08/29/2024    HGBA1C 10.0 04/04/2023    .00 (H) 01/30/2025    CREATININE 1.09 (H) 01/30/2025    CREATININE 0.99 08/28/2023      Pathophysiology/treatment/dangers discussed.   Role of long-acting versus quick insulin discussed with the patient's son-continue long-acting insulin-10 units of quick acting insulin before biggest meal  Blood sugar goal of less than 120 fasting and 140-150 about 2 hours after meal.   Current HA1C is 8.6. Hemoglobin A1c needs to be rechecked in 3 months. Goal less than 6.5.  Follow ADA Diet. Avoid soda, simple sweets, and limit rice/pasta/breads/starches (no more than 45-50 grams per meal).  Maintain healthy weight with goal BMI <30.  Exercise 5 times per week for 30 minutes per day.  Stressed importance of daily foot exams.Stressed importance of annual dilated eye exam.   - Hemoglobin A1C; Future  - Comprehensive Metabolic Panel; Future  - Lipid Panel; Future    2. Essential hypertension  Patient has been taking medication-Benicar 40 mg-Norvasc 5 mg-clonidine has not needed. Blood pressure goal of less than 130/80-blood pressure is mildly elevated. Continue to encourage diet and activity modifications. Including stress management. Pathophysiology/treatment/dangers discussed.    3. Mixed hyperlipidemia  Lab Results   Component Value Date    CHOL 260 (H) 01/30/2025    CHOL 260 (H) 08/28/2023    .00 (H) 01/30/2025    TRIG 96 01/30/2025    TRIG 129 08/28/2023    HDL 53 01/30/2025    HDL 54 08/28/2023     Pathophysiology/treatment/dangers discussed. Patient to continue diet modification-restart cholesterol lowering agent-American College of Cardiology guidelines discussed.   Total cholesterol 260 ( Goal less than 200) HDL 53 ( Goal high as  possible)  ( Goal less than 100) Triglycerides 96 ( Goal less than 150)     4. Anxiety and depression  Patient is doing well on medication - continue to encourage lifestyle modifications including community involvement.    5. Stage 3b chronic kidney disease  Pathophysiology/Treatment/ Dangers Discussed.  Maximize management of risk factors including type 2 diabetes, hypertension, hyperlipidemia and hydration.     Follow up in about 3 months (around 5/6/2025) for Diabetes. In addition to their scheduled follow up, the patient has also been instructed to follow up on as needed basis.     Future Appointments   Date Time Provider Department Center   2/20/2025  1:00 PM Allie Mena MD [facility]   3/10/2025  9:40 [facility]   3/10/2025 10:00 AM [facility] Machado [facility]   4/15/2025  1:00 PM Juan David Zapata MD [facility]   5/6/2025 10:00 AM Lilliana De La Torre MD [facility] [place]   9/2/2025 10:00 AM Lilliana De La Torre MD [facility] [place] Indira Gautam, MD

## 2025-02-06 ENCOUNTER — OFFICE VISIT (OUTPATIENT)
Dept: FAMILY MEDICINE | Facility: CLINIC | Age: 80
End: 2025-02-06
Payer: MEDICARE

## 2025-02-06 VITALS
HEIGHT: 63 IN | RESPIRATION RATE: 16 BRPM | BODY MASS INDEX: 27 KG/M2 | DIASTOLIC BLOOD PRESSURE: 80 MMHG | OXYGEN SATURATION: 97 % | WEIGHT: 152.38 LBS | HEART RATE: 71 BPM | SYSTOLIC BLOOD PRESSURE: 140 MMHG

## 2025-02-06 DIAGNOSIS — F32.A ANXIETY AND DEPRESSION: ICD-10-CM

## 2025-02-06 DIAGNOSIS — E11.51 TYPE 2 DIABETES MELLITUS WITH DIABETIC PERIPHERAL ANGIOPATHY WITHOUT GANGRENE, WITH LONG-TERM CURRENT USE OF INSULIN: Primary | ICD-10-CM

## 2025-02-06 DIAGNOSIS — F41.9 ANXIETY AND DEPRESSION: ICD-10-CM

## 2025-02-06 DIAGNOSIS — I10 ESSENTIAL HYPERTENSION: ICD-10-CM

## 2025-02-06 DIAGNOSIS — N18.32 STAGE 3B CHRONIC KIDNEY DISEASE: ICD-10-CM

## 2025-02-06 DIAGNOSIS — E78.2 MIXED HYPERLIPIDEMIA: ICD-10-CM

## 2025-02-06 DIAGNOSIS — Z79.4 TYPE 2 DIABETES MELLITUS WITH DIABETIC PERIPHERAL ANGIOPATHY WITHOUT GANGRENE, WITH LONG-TERM CURRENT USE OF INSULIN: Primary | ICD-10-CM

## 2025-02-06 PROCEDURE — G2211 COMPLEX E/M VISIT ADD ON: HCPCS | Mod: ,,, | Performed by: FAMILY MEDICINE

## 2025-02-06 PROCEDURE — 1159F MED LIST DOCD IN RCRD: CPT | Mod: CPTII,,, | Performed by: FAMILY MEDICINE

## 2025-02-06 PROCEDURE — 3079F DIAST BP 80-89 MM HG: CPT | Mod: CPTII,,, | Performed by: FAMILY MEDICINE

## 2025-02-06 PROCEDURE — 3288F FALL RISK ASSESSMENT DOCD: CPT | Mod: CPTII,,, | Performed by: FAMILY MEDICINE

## 2025-02-06 PROCEDURE — 1160F RVW MEDS BY RX/DR IN RCRD: CPT | Mod: CPTII,,, | Performed by: FAMILY MEDICINE

## 2025-02-06 PROCEDURE — 3077F SYST BP >= 140 MM HG: CPT | Mod: CPTII,,, | Performed by: FAMILY MEDICINE

## 2025-02-06 PROCEDURE — 99214 OFFICE O/P EST MOD 30 MIN: CPT | Mod: ,,, | Performed by: FAMILY MEDICINE

## 2025-02-06 PROCEDURE — 1126F AMNT PAIN NOTED NONE PRSNT: CPT | Mod: CPTII,,, | Performed by: FAMILY MEDICINE

## 2025-02-06 PROCEDURE — 1101F PT FALLS ASSESS-DOCD LE1/YR: CPT | Mod: CPTII,,, | Performed by: FAMILY MEDICINE

## 2025-02-11 DIAGNOSIS — G30.1 MODERATE LATE ONSET ALZHEIMER'S DEMENTIA WITHOUT BEHAVIORAL DISTURBANCE, PSYCHOTIC DISTURBANCE, MOOD DISTURBANCE, OR ANXIETY: Primary | ICD-10-CM

## 2025-02-11 DIAGNOSIS — F02.B0 MODERATE LATE ONSET ALZHEIMER'S DEMENTIA WITHOUT BEHAVIORAL DISTURBANCE, PSYCHOTIC DISTURBANCE, MOOD DISTURBANCE, OR ANXIETY: Primary | ICD-10-CM

## 2025-02-11 RX ORDER — MEMANTINE HYDROCHLORIDE 10 MG/1
10 TABLET ORAL DAILY
Qty: 30 TABLET | Refills: 11 | Status: SHIPPED | OUTPATIENT
Start: 2025-02-11 | End: 2026-02-11

## 2025-02-11 RX ORDER — DONEPEZIL HYDROCHLORIDE 10 MG/1
10 TABLET, FILM COATED ORAL EVERY MORNING
Qty: 30 TABLET | Refills: 11 | Status: SHIPPED | OUTPATIENT
Start: 2025-02-11 | End: 2026-02-11

## 2025-02-13 ENCOUNTER — TELEPHONE (OUTPATIENT)
Dept: FAMILY MEDICINE | Facility: CLINIC | Age: 80
End: 2025-02-13
Payer: MEDICARE

## 2025-02-17 ENCOUNTER — TELEPHONE (OUTPATIENT)
Dept: FAMILY MEDICINE | Facility: CLINIC | Age: 80
End: 2025-02-17
Payer: MEDICARE

## 2025-02-26 ENCOUNTER — TELEPHONE (OUTPATIENT)
Dept: NEUROLOGY | Facility: CLINIC | Age: 80
End: 2025-02-26
Payer: MEDICARE

## 2025-02-26 ENCOUNTER — TELEPHONE (OUTPATIENT)
Dept: FAMILY MEDICINE | Facility: CLINIC | Age: 80
End: 2025-02-26
Payer: MEDICARE

## 2025-02-26 ENCOUNTER — HOSPITAL ENCOUNTER (EMERGENCY)
Facility: HOSPITAL | Age: 80
Discharge: PSYCHIATRIC OR SUBSTANCE USE FACILITY | End: 2025-02-27
Attending: EMERGENCY MEDICINE
Payer: MEDICARE

## 2025-02-26 VITALS
SYSTOLIC BLOOD PRESSURE: 165 MMHG | RESPIRATION RATE: 16 BRPM | TEMPERATURE: 98 F | WEIGHT: 150 LBS | HEART RATE: 83 BPM | OXYGEN SATURATION: 96 % | HEIGHT: 63 IN | DIASTOLIC BLOOD PRESSURE: 75 MMHG | BODY MASS INDEX: 26.58 KG/M2

## 2025-02-26 DIAGNOSIS — F03.C11 SEVERE DEMENTIA WITH AGITATION, UNSPECIFIED DEMENTIA TYPE: Primary | ICD-10-CM

## 2025-02-26 LAB
ACCEPTIBLE SP GR UR QL: 1.02 (ref 1–1.03)
ALBUMIN SERPL-MCNC: 3.5 G/DL (ref 3.4–4.8)
ALBUMIN/GLOB SERPL: 1.1 RATIO (ref 1.1–2)
ALP SERPL-CCNC: 107 UNIT/L (ref 40–150)
ALT SERPL-CCNC: 13 UNIT/L (ref 0–55)
AMPHET UR QL SCN: NEGATIVE
ANION GAP SERPL CALC-SCNC: 7 MEQ/L
AST SERPL-CCNC: 20 UNIT/L (ref 5–34)
BACTERIA #/AREA URNS AUTO: ABNORMAL /HPF
BARBITURATE SCN PRESENT UR: NEGATIVE
BASOPHILS # BLD AUTO: 0.06 X10(3)/MCL
BASOPHILS NFR BLD AUTO: 0.9 %
BENZODIAZ UR QL SCN: NEGATIVE
BILIRUB SERPL-MCNC: 0.3 MG/DL
BILIRUB UR QL STRIP.AUTO: NEGATIVE
BUN SERPL-MCNC: 23.9 MG/DL (ref 9.8–20.1)
CALCIUM SERPL-MCNC: 9.4 MG/DL (ref 8.4–10.2)
CANNABINOIDS UR QL SCN: NEGATIVE
CHLORIDE SERPL-SCNC: 107 MMOL/L (ref 98–107)
CLARITY UR: CLEAR
CO2 SERPL-SCNC: 26 MMOL/L (ref 23–31)
COCAINE UR QL SCN: NEGATIVE
COLOR UR AUTO: ABNORMAL
CREAT SERPL-MCNC: 1.37 MG/DL (ref 0.55–1.02)
CREAT/UREA NIT SERPL: 17
EOSINOPHIL # BLD AUTO: 0.05 X10(3)/MCL (ref 0–0.9)
EOSINOPHIL NFR BLD AUTO: 0.8 %
ERYTHROCYTE [DISTWIDTH] IN BLOOD BY AUTOMATED COUNT: 15.6 % (ref 11.5–17)
ETHANOL SERPL-MCNC: <10 MG/DL
FENTANYL UR QL SCN: POSITIVE
GFR SERPLBLD CREATININE-BSD FMLA CKD-EPI: 39 ML/MIN/1.73/M2
GLOBULIN SER-MCNC: 3.2 GM/DL (ref 2.4–3.5)
GLUCOSE SERPL-MCNC: 239 MG/DL (ref 82–115)
GLUCOSE UR QL STRIP: ABNORMAL
HCT VFR BLD AUTO: 35.1 % (ref 37–47)
HGB BLD-MCNC: 11.2 G/DL (ref 12–16)
HGB UR QL STRIP: NEGATIVE
IMM GRANULOCYTES # BLD AUTO: 0.02 X10(3)/MCL (ref 0–0.04)
IMM GRANULOCYTES NFR BLD AUTO: 0.3 %
KETONES UR QL STRIP: NEGATIVE
LEUKOCYTE ESTERASE UR QL STRIP: 500
LYMPHOCYTES # BLD AUTO: 2.28 X10(3)/MCL (ref 0.6–4.6)
LYMPHOCYTES NFR BLD AUTO: 35.3 %
MCH RBC QN AUTO: 26.9 PG (ref 27–31)
MCHC RBC AUTO-ENTMCNC: 31.9 G/DL (ref 33–36)
MCV RBC AUTO: 84.2 FL (ref 80–94)
MDMA UR QL SCN: NEGATIVE
MONOCYTES # BLD AUTO: 0.71 X10(3)/MCL (ref 0.1–1.3)
MONOCYTES NFR BLD AUTO: 11 %
MUCOUS THREADS URNS QL MICRO: ABNORMAL /LPF
NEUTROPHILS # BLD AUTO: 3.33 X10(3)/MCL (ref 2.1–9.2)
NEUTROPHILS NFR BLD AUTO: 51.7 %
NITRITE UR QL STRIP: NEGATIVE
NRBC BLD AUTO-RTO: 0 %
OPIATES UR QL SCN: NEGATIVE
PCP UR QL: NEGATIVE
PH UR STRIP: 5.5 [PH]
PH UR: 5.5 [PH] (ref 3–11)
PLATELET # BLD AUTO: 220 X10(3)/MCL (ref 130–400)
PMV BLD AUTO: 9.8 FL (ref 7.4–10.4)
POCT GLUCOSE: 278 MG/DL (ref 70–110)
POTASSIUM SERPL-SCNC: 5.2 MMOL/L (ref 3.5–5.1)
PROT SERPL-MCNC: 6.7 GM/DL (ref 5.8–7.6)
PROT UR QL STRIP: ABNORMAL
RBC # BLD AUTO: 4.17 X10(6)/MCL (ref 4.2–5.4)
RBC #/AREA URNS AUTO: ABNORMAL /HPF
SODIUM SERPL-SCNC: 140 MMOL/L (ref 136–145)
SP GR UR STRIP.AUTO: 1.02 (ref 1–1.03)
SQUAMOUS #/AREA URNS LPF: ABNORMAL /HPF
UROBILINOGEN UR STRIP-ACNC: NORMAL
WBC # BLD AUTO: 6.45 X10(3)/MCL (ref 4.5–11.5)
WBC #/AREA URNS AUTO: ABNORMAL /HPF

## 2025-02-26 PROCEDURE — 81001 URINALYSIS AUTO W/SCOPE: CPT | Performed by: EMERGENCY MEDICINE

## 2025-02-26 PROCEDURE — 82962 GLUCOSE BLOOD TEST: CPT

## 2025-02-26 PROCEDURE — 25000003 PHARM REV CODE 250: Performed by: EMERGENCY MEDICINE

## 2025-02-26 PROCEDURE — 80053 COMPREHEN METABOLIC PANEL: CPT | Performed by: EMERGENCY MEDICINE

## 2025-02-26 PROCEDURE — 99285 EMERGENCY DEPT VISIT HI MDM: CPT

## 2025-02-26 PROCEDURE — 80307 DRUG TEST PRSMV CHEM ANLYZR: CPT | Performed by: EMERGENCY MEDICINE

## 2025-02-26 PROCEDURE — 82077 ASSAY SPEC XCP UR&BREATH IA: CPT | Performed by: EMERGENCY MEDICINE

## 2025-02-26 PROCEDURE — 85025 COMPLETE CBC W/AUTO DIFF WBC: CPT | Performed by: EMERGENCY MEDICINE

## 2025-02-26 RX ORDER — AMLODIPINE BESYLATE 5 MG/1
10 TABLET ORAL
Status: COMPLETED | OUTPATIENT
Start: 2025-02-26 | End: 2025-02-26

## 2025-02-26 RX ADMIN — AMLODIPINE BESYLATE 10 MG: 5 TABLET ORAL at 06:02

## 2025-02-26 NOTE — TELEPHONE ENCOUNTER
Pt is having anger outburst and refusing to take her meds. For at least a month. Son is asking for a return call at [phone]. Pt taking Donepezil 10mg & 5 mg qam, Memantine 10mb qd

## 2025-02-26 NOTE — TELEPHONE ENCOUNTER
Relative left a voicemail with answering service about patient being combative - wanted to put her in a facility for 72 hours due to her possibly harming herself or anyone inside the home - Advised her to take patient to ER -- she verbalized understanding

## 2025-02-26 NOTE — ED PROVIDER NOTES
Encounter Date: 2/26/2025    SCRIBE #1 NOTE: I, Talib Herrera, am scribing for, and in the presence of,  Jake Dupree III, MD. I have scribed the following portions of the note - Other sections scribed: HPI, ROS, PE.       History     Chief Complaint   Patient presents with    Dementia     From home, refusing to take all medications today per ems. Pts son called the pcp who told them to come to ed for evaluation. Calm and cooperative in triage.      Patient is a 79 y.o. female with a PMHx of dementia, anxiety, HTN, HLD, and DM II presenting to the ED for a wellness check. Patient reports she does not want to take her medications anymore and she lives with her son who called her PCP for advice. Patient's PCP informed him to bring her in for evaluation so he called EMS. Patient denies any headache, neck pain, runny nose, and congestion. Patient overall states she feels fine.     The history is provided by the patient. No  was used.     Review of patient's allergies indicates:  No Known Allergies  Past Medical History:   Diagnosis Date    Anxiety and depression 04/08/2023    Dementia     Essential hypertension 04/08/2023    Mixed hyperlipidemia 04/08/2023    Osteopenia of multiple sites 04/08/2023    Type 2 diabetes mellitus with diabetic peripheral angiopathy without gangrene, with long-term current use of insulin 04/08/2023     Past Surgical History:   Procedure Laterality Date    BREAST BIOPSY      EYE SURGERY  09-    GI obstruction N/A     HYSTERECTOMY      MASTECTOMY, PARTIAL Left 11/8/2024    Procedure: MASTECTOMY, PARTIAL // Left / Need Sentimag need US Need Faxitron Need sterile charms;  Surgeon: Allie Mena MD;  Location: [facility];  Service: General;  Laterality: Left;  Need Sentimag  need US  Need Faxitron  Need sterile charms    PHACOEMULSIFICATION, CATARACT, WITH IOL INSERTION Left 06/04/2024    Procedure: PHACOEMULSIFICATION, CATARACT, WITH IOL INSERTION;  Surgeon:  Jose Daniel Villarreal MD;  Location: [facility] OR;  Service: Ophthalmology;  Laterality: Left;  3rd 0700    PHACOEMULSIFICATION, CATARACT, WITH IOL INSERTION Right 09/03/2024    Procedure: PHACOEMULSIFICATION, CATARACT, WITH IOL INSERTION;  Surgeon: Jose Daniel Villarreal MD;  Location: [facility] OR;  Service: Ophthalmology;  Laterality: Right;  2nd 0630    SENTINEL LYMPH NODE BIOPSY Left 11/8/2024    Procedure: BIOPSY, LYMPH NODE, SENTINEL // Left /need nuc med need gabrielle node;  Surgeon: Allie Mena MD;  Location: [facility];  Service: General;  Laterality: Left;  need nuc med  need gabrielle node    TUBAL LIGATION       Family History   Problem Relation Name Age of Onset    Coronary artery disease Mother Ferdinand Bazan. 85    Breast cancer Mother Ferdinand Bazan.     Cancer Mother Ferdinand Bazan.     Diabetes Mother Ferdinand Bazan.     Arthritis Mother Ferdinand Bazan.     Lung disease Father  77        Chronic Obstructive Lung Disease    Depression Son Shaka gudino jr      Social History[1]  Review of Systems   Constitutional:  Negative for fatigue, fever and unexpected weight change.   HENT:  Negative for congestion and rhinorrhea.    Eyes:  Negative for pain.   Respiratory:  Negative for chest tightness, shortness of breath and wheezing.    Cardiovascular:  Negative for chest pain.   Gastrointestinal:  Negative for abdominal pain, constipation, diarrhea, nausea and vomiting.   Genitourinary:  Negative for dysuria.   Musculoskeletal:  Negative for back pain and neck pain.   Skin:  Negative for rash.   Allergic/Immunologic: Negative for environmental allergies, food allergies and immunocompromised state.   Neurological:  Negative for dizziness, speech difficulty and headaches.   Hematological:  Does not bruise/bleed easily.   Psychiatric/Behavioral:  Negative for sleep disturbance and suicidal ideas.        Physical Exam     Initial Vitals [02/26/25 1546]   BP Pulse Resp Temp SpO2   (!) 172/84 87 18 98.3 °F (36.8 °C) 100 %      MAP       --          Physical Exam    Nursing note and vitals reviewed.  Constitutional: No distress.   HENT:   Head: Normocephalic and atraumatic.   Neck: Trachea normal.   Cardiovascular:  Normal rate and regular rhythm.           No murmur heard.  Pulmonary/Chest: Breath sounds normal. No respiratory distress.   Abdominal: Abdomen is soft. Bowel sounds are normal. She exhibits no distension. There is no abdominal tenderness.   Musculoskeletal:         General: Normal range of motion.      Lumbar back: Normal range of motion.     Neurological: She is alert. She has normal strength. No cranial nerve deficit.   Oriented x2   Skin: Skin is warm and dry. No rash noted.   Psychiatric:   Animated         ED Course   Procedures  Labs Reviewed   COMPREHENSIVE METABOLIC PANEL - Abnormal       Result Value    Sodium 140      Potassium 5.2 (*)     Chloride 107      CO2 26      Glucose 239 (*)     Blood Urea Nitrogen 23.9 (*)     Creatinine 1.37 (*)     Calcium 9.4      Protein Total 6.7      Albumin 3.5      Globulin 3.2      Albumin/Globulin Ratio 1.1      Bilirubin Total 0.3            ALT 13      AST 20      eGFR 39      Anion Gap 7.0      BUN/Creatinine Ratio 17     URINALYSIS, REFLEX TO URINE CULTURE - Abnormal    Color, UA Light-Yellow      Appearance, UA Clear      Specific Gravity, UA 1.020      pH, UA 5.5      Protein, UA 1+ (*)     Glucose, UA 1+ (*)     Ketones, UA Negative      Blood, UA Negative      Bilirubin, UA Negative      Urobilinogen, UA Normal      Nitrites, UA Negative      Leukocyte Esterase,  (*)     RBC, UA 0-5      WBC, UA 6-10 (*)     Bacteria, UA Trace      Squamous Epithelial Cells, UA Occasional (*)     Mucous, UA Trace (*)    DRUG SCREEN, URINE (BEAKER) - Abnormal    Amphetamines, Urine Negative      Barbiturates, Urine Negative      Benzodiazepine, Urine Negative      Cannabinoids, Urine Negative      Cocaine, Urine Negative      Fentanyl, Urine Positive (*)     MDMA, Urine Negative      Opiates,  Urine Negative      Phencyclidine, Urine Negative      pH, Urine 5.5      Specific Gravity, Urine Auto 1.020      Narrative:     Cut off concentrations:    Amphetamines - 1000 ng/ml  Barbiturates - 200 ng/ml  Benzodiazepine - 200 ng/ml  Cannabinoids (THC) - 50 ng/ml  Cocaine - 300 ng/ml  Fentanyl - 1.0 ng/ml  MDMA - 500 ng/ml  Opiates - 300 ng/ml   Phencyclidine (PCP) - 25 ng/ml    Specimen submitted for drug analysis and tested for pH and specific gravity in order to evaluate sample integrity. Suspect tampering if specific gravity is <1.003 and/or pH is not within the range of 4.5 - 8.0  False negatives may result form substances such as bleach added to urine.  False positives may result for the presence of a substance with similar chemical structure to the drug or its metabolite.    This test provides only a PRELIMINARY analytical test result. A more specific alternate chemical method must be used in order to obtain a confirmed analytical result. Gas chromatography/mass spectrometry (GC/MS) is the preferred confirmatory method. Other chemical confirmation methods are available. Clinical consideration and professional judgement should be applied to any drug of abuse test result, particularly when preliminary positive results are used.    Positive results will be confirmed only at the physicians request. Unconfirmed screening results are to be used only for medical purposes (treatment).        CBC WITH DIFFERENTIAL - Abnormal    WBC 6.45      RBC 4.17 (*)     Hgb 11.2 (*)     Hct 35.1 (*)     MCV 84.2      MCH 26.9 (*)     MCHC 31.9 (*)     RDW 15.6      Platelet 220      MPV 9.8      Neut % 51.7      Lymph % 35.3      Mono % 11.0      Eos % 0.8      Basophil % 0.9      Imm Grans % 0.3      Neut # 3.33      Lymph # 2.28      Mono # 0.71      Eos # 0.05      Baso # 0.06      Imm Gran # 0.02      NRBC% 0.0     ALCOHOL,MEDICAL (ETHANOL) - Normal    Ethanol Level <10.0     CBC W/ AUTO DIFFERENTIAL    Narrative:     The  following orders were created for panel order CBC Auto Differential.  Procedure                               Abnormality         Status                     ---------                               -----------         ------                     CBC with Differential[[id number]]       Abnormal            Final result                 Please view results for these tests on the individual orders.          Imaging Results    None          Medications   amLODIPine tablet 10 mg (10 mg Oral Given 2/26/25 1842)     Medical Decision Making  Differential diagnosis includes but is not limited to dementia.     CBC chemistry without abnormality patient's son shows videos of patient being agitated and confrontational states difficult to handle her like this will place an order psychiatric commitment to a geriatric psychiatric facility    Problems Addressed:  Severe dementia with agitation, unspecified dementia type: complicated acute illness or injury that poses a threat to life or bodily functions    Amount and/or Complexity of Data Reviewed  Labs: ordered.    Risk  Prescription drug management.            Scribe Attestation:   Scribe #1: I performed the above scribed service and the documentation accurately describes the services I performed. I attest to the accuracy of the note.    Attending Attestation:           Physician Attestation for Scribe:  Physician Attestation Statement for Scribe #1: I, Jake Dupree III, MD, reviewed documentation, as scribed by Talib Herrera in my presence, and it is both accurate and complete.                Medically cleared for psychiatry placement: 2/26/2025  6:50 PM                   Clinical Impression:  Final diagnoses:  [F03.C11] Severe dementia with agitation, unspecified dementia type (Primary)          ED Disposition Condition    Transfer to Psych Facility Stable          ED Prescriptions    None       Follow-up Information    None              [1]   Social History  Tobacco Use     Smoking status: Never    Smokeless tobacco: Never   Substance Use Topics    Alcohol use: Never    Drug use: Never        Jake Dupree III, MD  02/26/25 4700

## 2025-02-27 NOTE — ED NOTES
Pt PECd at this time. Pt placed into hospital scrubs, ambulated to [facility]. Report given to MOR Maier.
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
## 2025-03-05 ENCOUNTER — TELEPHONE (OUTPATIENT)
Dept: FAMILY MEDICINE | Facility: CLINIC | Age: 80
End: 2025-03-05
Payer: MEDICARE

## 2025-03-05 DIAGNOSIS — Z79.4 TYPE 2 DIABETES MELLITUS WITH DIABETIC PERIPHERAL ANGIOPATHY WITHOUT GANGRENE, WITH LONG-TERM CURRENT USE OF INSULIN: ICD-10-CM

## 2025-03-05 DIAGNOSIS — E11.51 TYPE 2 DIABETES MELLITUS WITH DIABETIC PERIPHERAL ANGIOPATHY WITHOUT GANGRENE, WITH LONG-TERM CURRENT USE OF INSULIN: ICD-10-CM

## 2025-03-05 RX ORDER — INSULIN GLARGINE 100 [IU]/ML
10 INJECTION, SOLUTION SUBCUTANEOUS DAILY
Qty: 3 ML | Refills: 2 | Status: SHIPPED | OUTPATIENT
Start: 2025-03-05

## 2025-03-05 RX ORDER — BLOOD-GLUCOSE SENSOR
1 EACH MISCELLANEOUS
Qty: 3 EACH | Refills: 3 | Status: SHIPPED | OUTPATIENT
Start: 2025-03-05 | End: 2026-03-05

## 2025-03-05 NOTE — TELEPHONE ENCOUNTER
Spoke to pt son, he requested new rx to be sent to pharmacy so that they can file with the new insurance. Refills sent to [facility] in [place].

## 2025-03-05 NOTE — TELEPHONE ENCOUNTER
Son left voicemail requesting a call back about some insulin - stated they needed you to release the script so patient can receive from new provider?

## 2025-03-16 NOTE — PROGRESS NOTES
Patient ID: [id number]     Chief Complaint: Hospital Follow Up      HPI:     Tara Parker is a 79 y.o. female here today with her son for acute visit :   Patient with dementia-history obtained from both patient and son.  Patient was taken to the hospital on February 26-after she refused to take medications was extremely irritable irritable.  In the emergency room patient was found to be medically stable-she was sent to [facility].  Discharged from Replaced by [facility]-with Zyprexa-since starting medication patient has gained significant weight-is constantly hungry and irritable.  Son obtained guidance from Neurologist regarding continuing management for dementia-concerns discussed.  Treatment options discussed at length.  1) Type 2 DM: Patient needs insulin prescription to be refilled-is no longer living in [place]-prescription needs to be sent to [facility] in [place].      Past Medical History:   Diagnosis Date    Anxiety and depression 04/08/2023    Dementia     Essential hypertension 04/08/2023    Mixed hyperlipidemia 04/08/2023    Osteopenia of multiple sites 04/08/2023    Type 2 diabetes mellitus with diabetic peripheral angiopathy without gangrene, with long-term current use of insulin 04/08/2023        Past Surgical History:   Procedure Laterality Date    BREAST BIOPSY      EYE SURGERY  09-    GI obstruction N/A     HYSTERECTOMY      MASTECTOMY, PARTIAL Left 11/8/2024    Procedure: MASTECTOMY, PARTIAL // Left / Need Sentimag need US Need Faxitron Need sterile charms;  Surgeon: Allie Mena MD;  Location: [facility] OR;  Service: General;  Laterality: Left;  Need Sentimag  need US  Need Faxitron  Need sterile charms    PHACOEMULSIFICATION, CATARACT, WITH IOL INSERTION Left 06/04/2024    Procedure: PHACOEMULSIFICATION, CATARACT, WITH IOL INSERTION;  Surgeon: Jose Daniel Villarreal MD;  Location: [facility] OR;  Service: Ophthalmology;  Laterality: Left;  3rd 0700    PHACOEMULSIFICATION, CATARACT, WITH IOL  INSERTION Right 09/03/2024    Procedure: PHACOEMULSIFICATION, CATARACT, WITH IOL INSERTION;  Surgeon: Jose Daniel Villarreal MD;  Location: [facility] OR;  Service: Ophthalmology;  Laterality: Right;  2nd 0630    SENTINEL LYMPH NODE BIOPSY Left 11/8/2024    Procedure: BIOPSY, LYMPH NODE, SENTINEL // Left /need nuc med need gabrielle node;  Surgeon: Allie Mena MD;  Location: [facility] OR;  Service: General;  Laterality: Left;  need nuc med  need gabrielle node    TUBAL LIGATION          Social History[1]     Social History[2]     Current Outpatient Medications   Medication Instructions    amLODIPine (NORVASC) 5 mg, Oral, Daily    aspirin (ECOTRIN) 81 mg, Oral, Daily    blood sugar diagnostic Strp To check BG 3 times daily, to use with insurance preferred meter    blood-glucose meter kit To check BG 3 times daily, to use with insurance preferred meter    blood-glucose sensor (FREESTYLE NISHA 3 SENSOR) Suzanne 1 Device, Misc.(Non-Drug; Combo Route), Every 14 days    cholecalciferol (vitamin D3) (VITAMIN D3) 2,000 Units, Oral, Daily    clonazePAM (KLONOPIN) 0.5 mg, Oral, 2 times daily PRN    cloNIDine (CATAPRES) 0.1 mg, Oral, As needed (PRN)    ibuprofen (ADVIL,MOTRIN) 200 mg, Daily PRN    JARDIANCE 25 mg tablet 1 tablet Orally Once a day for 30 days    ketorolac 0.5% (ACULAR) 0.5 % Drop 1 drop, 4 times daily    lancets Misc To check BG 3 times daily, to use with insurance preferred meter    LANTUS SOLOSTAR U-100 INSULIN 30 Units, Subcutaneous, Daily    memantine (NAMENDA) 10 mg, Oral, Daily    OLANZapine (ZYPREXA) 10 mg, Nightly    OLANZapine (ZYPREXA) 5 mg, Daily    olmesartan (BENICAR) 40 mg, Oral, Daily    prednisoLONE acetate (PRED FORTE) 1 % DrpS 1 drop, 2 times daily    rosuvastatin (CRESTOR) 10 mg, Oral, Daily    sertraline (ZOLOFT) 50 mg, Oral, Daily    traMADoL (ULTRAM) 50 mg, Oral, Every 8 hours PRN       Review of patient's allergies indicates:  No Known Allergies     Patient Care Team:  Lilliana De La Torre MD as PCP - General (Family  "Medicine)  Juan David Zapata MD as Consulting Physician (Neurology)  Roscoe Boyce MD as Consulting Physician (Cardiology)  Wendy Hussein RD as Diabetes Educator (Diabetes)  Allie Mena MD as Consulting Physician (Breast Surgery)       Subjective:     Review of Systems    12 point review of systems conducted, negative except as stated in the history of present illness. See HPI for details.      Objective:     Visit Vitals  /80   Pulse 72   Resp 18   Ht 5' 3" (1.6 m)   Wt 74.8 kg (164 lb 12.8 oz)   SpO2 99%   BMI 29.19 kg/m²       Physical Exam    General: No acute distress.  Head: Normocephalic, Atraumatic.  Eye: Pupils are equal, round and reactive to light, Extraocular movements are intact, Sclera non-icteric.  Ears/Nose/Throat: Normal, Mucosa moist,Clear.  Neck/Thyroid: Supple, Non-tender  Respiratory: Clear to auscultation bilaterally  Cardiovascular: S1/S2 normal  Gastrointestinal: Soft, Non-tender, Non-distended, Normal bowel sounds, No palpable organomegaly.  Musculoskeletal: Normal range of motion.  Integumentary: Warm, Dry  Extremities: No clubbing, cyanosis or edema  Neurologic:Cranial Nerves II-XII are grossly intact  Psychiatric:  Stable-periods of appropriateness      Assessment:       ICD-10-CM ICD-9-CM   1. Hospital discharge follow-up  Z09 V67.59   2. Moderate late onset Alzheimer's dementia without behavioral disturbance, psychotic disturbance, mood disturbance, or anxiety  G30.1 331.0    F02.B0 294.10   3. Type 2 diabetes mellitus with diabetic peripheral angiopathy without gangrene, with long-term current use of insulin  E11.51 250.70    Z79.4 443.81     V58.67        Plan:     1. Hospital discharge follow-up (Primary)  Patient was admitted to psychiatric facility for aggressive behavior -since discharge patient has not shown any changes-continues to have periods of aggressiveness-with significant changes in appetite.  Concerns regarding use of medication discussed with patient's son " and niece at length.  Benefits versus risks discussed-patient will not be monitored by Psychiatrist -recommendations for patient to be weaned off medication.   Transitional Care Note    Family and/or Caretaker present at visit?  Yes.  Diagnostic tests reviewed/disposition: No diagnosic tests pending after this hospitalization.  Disease/illness education:  End-stage dementia  Home health/community services discussion/referrals: Patient does not have home health established from hospital visit.  They do not need home health.  If needed, we will set up home health for the patient.   Establishment or re-establishment of referral orders for community resources: No other necessary community resources.   Discussion with other health care providers: No discussion with other health care providers necessary.       2. Moderate late onset Alzheimer's dementia without behavioral disturbance, psychotic disturbance, mood disturbance, or anxiety  Pathophysiology/Treatment/ Dangers Discussed.  Risks versus benefits of using benzodiazepines for aggressive behavior discussed-Rx Klonopin sent to the pharmacy-patient to start with half a tablet-during periods of increased anxiety/irritation.  Sent to call office with update  - clonazePAM (KLONOPIN) 0.5 MG tablet; Take 1 tablet (0.5 mg total) by mouth 2 (two) times daily as needed for Anxiety (Agaitation).  Dispense: 60 tablet; Refill: 1    3. Type 2 diabetes mellitus with diabetic peripheral angiopathy without gangrene, with long-term current use of insulin  Continue with insulin management-check labs prior to next visit.  - insulin glargine U-100, Lantus, (LANTUS SOLOSTAR U-100 INSULIN) 100 unit/mL (3 mL) InPn pen; Inject 30 Units into the skin once daily.  Dispense: 9 mL; Refill: 2  - Comprehensive Metabolic Panel; Future  - Lipid Panel; Future  - Hemoglobin A1C; Future  - CBC Auto Differential; Future    Follow up in about 4 weeks (around 4/16/2025). In addition to their scheduled  follow up, the patient has also been instructed to follow up on as needed basis.     Future Appointments   Date Time Provider Department Center   4[facility]2/2025 10:10 AM [facility]   4/2/2025 10:30 AM Nataliia Machado FNP [facility]   4/10/2025 10:45 AM Juan David Zapata MD [facility] [address]   4/16/2025 11:30 AM Lilliana De La Torre MD [facility] [place]   5/6/2025 10:00 AM Lilliana De La Torre MD [facility] [place]   9/2/2025 10:00 AM Lilliana De La Torre MD [facility] [place] Indira Gautam, MD         [1]   Social History  Tobacco Use    Smoking status: Never    Smokeless tobacco: Never   Substance Use Topics    Alcohol use: Never    Drug use: Never   [2]   Social History  Socioeconomic History    Marital status:     Number of children: 3

## 2025-03-17 ENCOUNTER — TELEPHONE (OUTPATIENT)
Dept: NEUROLOGY | Facility: CLINIC | Age: 80
End: 2025-03-17

## 2025-03-19 ENCOUNTER — OFFICE VISIT (OUTPATIENT)
Dept: FAMILY MEDICINE | Facility: CLINIC | Age: 80
End: 2025-03-19
Payer: MEDICARE

## 2025-03-19 VITALS
HEART RATE: 72 BPM | OXYGEN SATURATION: 99 % | BODY MASS INDEX: 29.2 KG/M2 | SYSTOLIC BLOOD PRESSURE: 138 MMHG | HEIGHT: 63 IN | RESPIRATION RATE: 18 BRPM | WEIGHT: 164.81 LBS | DIASTOLIC BLOOD PRESSURE: 80 MMHG

## 2025-03-19 DIAGNOSIS — E11.51 TYPE 2 DIABETES MELLITUS WITH DIABETIC PERIPHERAL ANGIOPATHY WITHOUT GANGRENE, WITH LONG-TERM CURRENT USE OF INSULIN: ICD-10-CM

## 2025-03-19 DIAGNOSIS — Z09 HOSPITAL DISCHARGE FOLLOW-UP: Primary | ICD-10-CM

## 2025-03-19 DIAGNOSIS — G30.1 MODERATE LATE ONSET ALZHEIMER'S DEMENTIA WITHOUT BEHAVIORAL DISTURBANCE, PSYCHOTIC DISTURBANCE, MOOD DISTURBANCE, OR ANXIETY: ICD-10-CM

## 2025-03-19 DIAGNOSIS — Z79.4 TYPE 2 DIABETES MELLITUS WITH DIABETIC PERIPHERAL ANGIOPATHY WITHOUT GANGRENE, WITH LONG-TERM CURRENT USE OF INSULIN: ICD-10-CM

## 2025-03-19 DIAGNOSIS — F02.B0 MODERATE LATE ONSET ALZHEIMER'S DEMENTIA WITHOUT BEHAVIORAL DISTURBANCE, PSYCHOTIC DISTURBANCE, MOOD DISTURBANCE, OR ANXIETY: ICD-10-CM

## 2025-03-19 PROCEDURE — 1159F MED LIST DOCD IN RCRD: CPT | Mod: CPTII,,, | Performed by: FAMILY MEDICINE

## 2025-03-19 PROCEDURE — 99214 OFFICE O/P EST MOD 30 MIN: CPT | Mod: ,,, | Performed by: FAMILY MEDICINE

## 2025-03-19 PROCEDURE — 3075F SYST BP GE 130 - 139MM HG: CPT | Mod: CPTII,,, | Performed by: FAMILY MEDICINE

## 2025-03-19 PROCEDURE — G2211 COMPLEX E/M VISIT ADD ON: HCPCS | Mod: ,,, | Performed by: FAMILY MEDICINE

## 2025-03-19 PROCEDURE — 1126F AMNT PAIN NOTED NONE PRSNT: CPT | Mod: CPTII,,, | Performed by: FAMILY MEDICINE

## 2025-03-19 PROCEDURE — 3288F FALL RISK ASSESSMENT DOCD: CPT | Mod: CPTII,,, | Performed by: FAMILY MEDICINE

## 2025-03-19 PROCEDURE — 1101F PT FALLS ASSESS-DOCD LE1/YR: CPT | Mod: CPTII,,, | Performed by: FAMILY MEDICINE

## 2025-03-19 PROCEDURE — 1160F RVW MEDS BY RX/DR IN RCRD: CPT | Mod: CPTII,,, | Performed by: FAMILY MEDICINE

## 2025-03-19 PROCEDURE — 3079F DIAST BP 80-89 MM HG: CPT | Mod: CPTII,,, | Performed by: FAMILY MEDICINE

## 2025-03-19 RX ORDER — OLANZAPINE 5 MG/1
5 TABLET ORAL DAILY
COMMUNITY
Start: 2025-03-13

## 2025-03-19 RX ORDER — OLANZAPINE 10 MG/1
10 TABLET ORAL NIGHTLY
COMMUNITY
Start: 2025-03-13

## 2025-03-19 RX ORDER — CLONAZEPAM 0.5 MG/1
0.5 TABLET ORAL 2 TIMES DAILY PRN
Qty: 60 TABLET | Refills: 1 | Status: SHIPPED | OUTPATIENT
Start: 2025-03-19 | End: 2026-03-19

## 2025-03-19 RX ORDER — INSULIN GLARGINE 100 [IU]/ML
30 INJECTION, SOLUTION SUBCUTANEOUS DAILY
Qty: 9 ML | Refills: 2 | Status: SHIPPED | OUTPATIENT
Start: 2025-03-19 | End: 2025-06-17

## 2025-03-19 RX ORDER — INSULIN GLARGINE 100 [IU]/ML
30 INJECTION, SOLUTION SUBCUTANEOUS DAILY
Qty: 9 ML | Refills: 2 | Status: SHIPPED | OUTPATIENT
Start: 2025-03-19 | End: 2025-03-19 | Stop reason: CLARIF

## 2025-03-20 ENCOUNTER — TELEPHONE (OUTPATIENT)
Dept: FAMILY MEDICINE | Facility: CLINIC | Age: 80
End: 2025-03-20
Payer: MEDICARE

## 2025-03-20 NOTE — TELEPHONE ENCOUNTER
----- Message from Lilliana De La Torre MD sent at 3/20/2025 12:40 PM CDT -----  Please notify son that patient needs to complete labs prior to next visit

## 2025-03-27 DIAGNOSIS — Z12.11 SCREENING FOR COLORECTAL CANCER: Primary | ICD-10-CM

## 2025-03-27 DIAGNOSIS — Z12.12 SCREENING FOR COLORECTAL CANCER: Primary | ICD-10-CM

## 2025-04-01 NOTE — PROGRESS NOTES
Subjective:       Patient ID: Tara Parker is a 79 y.o. female.    Chief Complaint: Follow up    Diagnosis: Stage IA Left Breast Cancer(Hormone Positive)    Current Treatment: Plan to start Letrozole    Treatment History:   11/8/2024 L breast partial mastectomy and SLNB - Dr. 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 She had an abnormal screening MMG in August '24. Follow up imaging revealed an 9mm mass at the 5 o'clock position, 5CMFN. Biopsy of mass revealed invasive ductal carcinoma with mucinous features, grade 1, ER 94%, IA 93%, HER2 2+, FISH negative. She has dementia and her son is POA. She ultimately underwent left breast lumpectomy with Dr. Mena on 11/8/24 where final pathology showed 10mm IDC, G1, negative margins 0/2 lymph nodes involved with metastatic cancer. She has seen radiation and ultimately decided not to proceed with XRT therapy. She presents to medical oncology for further evaluation with her son, POA.     Her diagnosis, stage, and prognosis were discussed. The NCCN guidelines when discussing her treatment recommendations moving forward were referenced.     The mechanism, benefits, and side effect profile of Letrozole was reviewed with patient. The benefits described include reduced risk of contralateral breast cancer and reduced risk of distant metastatic disease. Side effects discussed include joint pain, hot flashes, vaginal dryness, arthralgias and loss of bone mineral density. The need for supplemental calcium and vitamin D were discussed. Osteopenia resulted on her most recent DEXA. Planned to start Prolia q 6month. However, she and her son would rather wait for now given her age and poly-medicine. Her son Shaka would like to research medication further prior to proceeding. Prolia and Letrozole drug information was provided to patient and son. She and her son are fully aware of increased risks for fracture  with starting Letrozole. Will follow up in upcoming visit.     Interval History:   Patient here today for 6 week NP follow up appointment and labs for toxicity check after starting Letrozole.   Overall she states she is feeling well.   Today, she have no complaints.   She states she have not started Letrozole due to not having the medication.  Discussed Letrozole, Prolia, its role and potential toxicities with patient and her son  She denies abnormal bruising or bleeding.   Otherwise no other issues or concerns      Past Medical History:   Diagnosis Date    Anxiety and depression 04/08/2023    Dementia     Essential hypertension 04/08/2023    Mixed hyperlipidemia 04/08/2023    Osteopenia of multiple sites 04/08/2023    Type 2 diabetes mellitus with diabetic peripheral angiopathy without gangrene, with long-term current use of insulin 04/08/2023      Past Surgical History:   Procedure Laterality Date    BREAST BIOPSY      EYE SURGERY  09-    GI obstruction N/A     HYSTERECTOMY      MASTECTOMY, PARTIAL Left 11/8/2024    Procedure: MASTECTOMY, PARTIAL // Left / Need Sentimag need US Need Faxitron Need sterile charms;  Surgeon: Allie Mena MD;  Location: [facility];  Service: General;  Laterality: Left;  Need Sentimag  need US  Need Faxitron  Need sterile charms    PHACOEMULSIFICATION, CATARACT, WITH IOL INSERTION Left 06/04/2024    Procedure: PHACOEMULSIFICATION, CATARACT, WITH IOL INSERTION;  Surgeon: Jose Daniel Villarreal MD;  Location: [facility];  Service: Ophthalmology;  Laterality: Left;  3rd 0700    PHACOEMULSIFICATION, CATARACT, WITH IOL INSERTION Right 09/03/2024    Procedure: PHACOEMULSIFICATION, CATARACT, WITH IOL INSERTION;  Surgeon: Jose Daniel Villarreal MD;  Location: [facility];  Service: Ophthalmology;  Laterality: Right;  2nd 0630    SENTINEL LYMPH NODE BIOPSY Left 11/8/2024    Procedure: BIOPSY, LYMPH NODE, SENTINEL // Left /need nuc med need gabrielle node;  Surgeon: Allie Mena MD;  Location: [facility];   Service: General;  Laterality: Left;  need nuc med  need gabrielle node    TUBAL LIGATION       Social History     Socioeconomic History    Marital status:     Number of children: 3   Occupational History    Occupation: Retired: [employer]   Tobacco Use    Smoking status: Never    Smokeless tobacco: Never   Substance and Sexual Activity    Alcohol use: Never    Drug use: Never    Sexual activity: Not Currently     Partners: Male     Birth control/protection: None     Social Drivers of Health     Financial Resource Strain: Low Risk  (5/8/2024)    Overall Financial Resource Strain (CARDIA)     Difficulty of Paying Living Expenses: Not hard at all   Food Insecurity: No Food Insecurity (5/8/2024)    Hunger Vital Sign     Worried About Running Out of Food in the Last Year: Never true     Ran Out of Food in the Last Year: Never true   Transportation Needs: No Transportation Needs (5/8/2024)    TRANSPORTATION NEEDS     Transportation : No   Physical Activity: Sufficiently Active (5/8/2024)    Exercise Vital Sign     Days of Exercise per Week: 7 days     Minutes of Exercise per Session: 60 min   Stress: No Stress Concern Present (5/8/2024)    [employer] - Occupational Stress Questionnaire     Feeling of Stress : Not at all   Housing Stability: Unknown (5/8/2024)    Housing Stability Vital Sign     Unable to Pay for Housing in the Last Year: No     Homeless in the Last Year: No      Family History   Problem Relation Name Age of Onset    Coronary artery disease Mother Ferdinand Bazan. 85    Breast cancer Mother Ferdinand Bazan.     Cancer Mother Ferdinand Bazan.     Diabetes Mother Ferdinand Bazan.     Arthritis Mother Ferdinand Bazan.     Lung disease Father  77        Chronic Obstructive Lung Disease    Depression Son Shaka gudino        Review of patient's allergies indicates:  No Known Allergies   Review of Systems   Constitutional:  Negative for activity change, fever and unexpected weight change.    HENT:  Negative for sore throat.    Eyes:  Negative for visual disturbance.   Respiratory:  Negative for cough and shortness of breath.    Cardiovascular:  Negative for chest pain.   Gastrointestinal:  Negative for abdominal pain, diarrhea, nausea and vomiting.   Endocrine: Negative for polyuria.   Genitourinary:  Negative for dysuria and hot flashes.   Integumentary:  Negative for rash.   Neurological:  Negative for weakness and headaches.   Hematological:  Negative for adenopathy.   Psychiatric/Behavioral:  Negative for confusion.          Objective:      Vitals:    04/02/25 1021   BP: (!) 131/55   Pulse: 70   Resp: 18   Temp: 97.7 °F (36.5 °C)       Physical Exam  Constitutional:       General: She is not in acute distress.     Appearance: Normal appearance. She is not ill-appearing.   HENT:      Head: Normocephalic and atraumatic.      Nose: Nose normal.      Mouth/Throat:      Mouth: Mucous membranes are moist.      Pharynx: Oropharynx is clear.   Eyes:      Extraocular Movements: Extraocular movements intact.      Conjunctiva/sclera: Conjunctivae normal.      Pupils: Pupils are equal, round, and reactive to light.   Cardiovascular:      Rate and Rhythm: Normal rate and regular rhythm.      Pulses: Normal pulses.      Heart sounds: Normal heart sounds. No murmur heard.  Pulmonary:      Effort: Pulmonary effort is normal. No respiratory distress.      Breath sounds: Normal breath sounds.   Abdominal:      General: There is no distension.      Palpations: Abdomen is soft.      Tenderness: There is no abdominal tenderness.   Musculoskeletal:         General: Normal range of motion.      Cervical back: Normal range of motion and neck supple.      Right lower leg: No edema.      Left lower leg: No edema.   Lymphadenopathy:      Cervical: No cervical adenopathy.   Skin:     General: Skin is warm and dry.   Neurological:      General: No focal deficit present.      Mental Status: She is alert and oriented to person,  place, and time.         LABS AND IMAGING REVIEWED IN EPIC    Imaging:   Bilateral screening mammogram 08/29/2024:  Density B.  BI-RADS 0.  Mass in the left breast at 6:00, posterior depth.  Asymmetry in the left breast lateral region middle depth on CC view.  No suspicious findings in the right breast.  Left diagnostic mammogram 09/05/2024:  Density B.  BI-RADS 5.  Central inferior left breast, posterior depth there is a persistent 0.8 cm mass with spiculated margins.  On ultrasound this measures 0.8 x 0.3 x 0.5 cm.  In the 4:00 left breast, 2 cm from the nipple there is a 0.4 cm benign complicated cyst.    Ultrasound of the left axilla demonstrates no significant left axillary adenopathy.  Ultrasound-guided biopsy left breast 09/30/2024:  Mass at 5:00, 5 cm from the nipple: Successful.  Coil clip placed.  Good position.       8/29/24 Dexa:  Impression:  Osteopenia        Pathology:   Left breast 5:00, 5 cm from nipple mass core biopsy 09/30/2024: Invasive ductal carcinoma with mucinous features, well differentiated, grade 1, ER 93%, AK 93%, HER2 2+, fish negative.  L partial mastectomy and SLNB 11/8/24: IDC with partial mucinous features, G1, 10mm.  Margins negative. Closest margin posterior at 6mm. Two lymph nodes removed and both negative for tumor.     Assessment:   Stage 1 Left Breast Cancer - pT1bN0. G1. S/p lumpectomy 11/24. No role for adjuvant XRT. Plan to proceed with AI therapy for 5 years.     Osteopenia; Planned to start Prolia q 6month. However, she and her son would rather wait for now given her age and poly-medicine    Dexa due: Aug '26  Plan:   - Discussed benefits, risks, and side effects of Letrozole   - Toxicity reviewed; Letrozole 2.5mg PO Daily; sent to pharmacy today  - Okay to start Calcium and Vitamin D supplementation  -  Planned to start Prolia q 6month. However, she and her son would rather wait for now given her age and poly-medicine  - RTC 6 weeks for NP visit, labs same day    I spent  a total of 40 minutes on the day of the visit.This includes face to face time and non-face to face time preparing to see the patient (eg, review of tests), obtaining and/or reviewing separately obtained history, documenting clinical information in the electronic or other health record, independently interpreting results and communicating results to the patient/family/caregiver, or care coordinator.    Visit today included increased complexity associated with the care of the episodic problem breast cancer, addressing and managing the longitudinal care of the patient's Stage 1 Left Breast Cancer.     DARION Musa  Hematology/Oncology   [facility]

## 2025-04-02 ENCOUNTER — LAB VISIT (OUTPATIENT)
Dept: LAB | Facility: HOSPITAL | Age: 80
End: 2025-04-02
Attending: STUDENT IN AN ORGANIZED HEALTH CARE EDUCATION/TRAINING PROGRAM
Payer: MEDICARE

## 2025-04-02 ENCOUNTER — OFFICE VISIT (OUTPATIENT)
Dept: HEMATOLOGY/ONCOLOGY | Facility: CLINIC | Age: 80
End: 2025-04-02
Payer: MEDICARE

## 2025-04-02 VITALS
TEMPERATURE: 98 F | HEART RATE: 70 BPM | RESPIRATION RATE: 18 BRPM | OXYGEN SATURATION: 99 % | WEIGHT: 160.19 LBS | BODY MASS INDEX: 28.38 KG/M2 | SYSTOLIC BLOOD PRESSURE: 131 MMHG | DIASTOLIC BLOOD PRESSURE: 55 MMHG | HEIGHT: 63 IN

## 2025-04-02 DIAGNOSIS — C50.512 MALIGNANT NEOPLASM OF LOWER-OUTER QUADRANT OF LEFT BREAST OF FEMALE, ESTROGEN RECEPTOR POSITIVE: ICD-10-CM

## 2025-04-02 DIAGNOSIS — Z17.0 MALIGNANT NEOPLASM OF LOWER-OUTER QUADRANT OF LEFT BREAST OF FEMALE, ESTROGEN RECEPTOR POSITIVE: ICD-10-CM

## 2025-04-02 DIAGNOSIS — M85.80 OSTEOPENIA, UNSPECIFIED LOCATION: ICD-10-CM

## 2025-04-02 DIAGNOSIS — C50.912 INVASIVE DUCTAL CARCINOMA OF BREAST, FEMALE, LEFT: Primary | ICD-10-CM

## 2025-04-02 DIAGNOSIS — D64.9 ANEMIA, UNSPECIFIED TYPE: ICD-10-CM

## 2025-04-02 DIAGNOSIS — C50.912 INVASIVE DUCTAL CARCINOMA OF BREAST, FEMALE, LEFT: ICD-10-CM

## 2025-04-02 LAB
ALBUMIN SERPL-MCNC: 3.4 G/DL (ref 3.4–4.8)
ALBUMIN/GLOB SERPL: 0.8 RATIO (ref 1.1–2)
ALP SERPL-CCNC: 122 UNIT/L (ref 40–150)
ALT SERPL-CCNC: 13 UNIT/L (ref 0–55)
ANION GAP SERPL CALC-SCNC: 5 MEQ/L
AST SERPL-CCNC: 21 UNIT/L (ref 11–45)
BASOPHILS # BLD AUTO: 0.05 X10(3)/MCL
BASOPHILS NFR BLD AUTO: 0.8 %
BILIRUB SERPL-MCNC: 0.2 MG/DL
BUN SERPL-MCNC: 39.3 MG/DL (ref 9.8–20.1)
CALCIUM SERPL-MCNC: 9.1 MG/DL (ref 8.4–10.2)
CHLORIDE SERPL-SCNC: 110 MMOL/L (ref 98–107)
CO2 SERPL-SCNC: 25 MMOL/L (ref 23–31)
CREAT SERPL-MCNC: 1.54 MG/DL (ref 0.55–1.02)
CREAT/UREA NIT SERPL: 26
EOSINOPHIL # BLD AUTO: 0.35 X10(3)/MCL (ref 0–0.9)
EOSINOPHIL NFR BLD AUTO: 5.4 %
ERYTHROCYTE [DISTWIDTH] IN BLOOD BY AUTOMATED COUNT: 15.1 % (ref 11.5–17)
FERRITIN SERPL-MCNC: 155.47 NG/ML (ref 4.63–204)
GFR SERPLBLD CREATININE-BSD FMLA CKD-EPI: 34 ML/MIN/1.73/M2
GLOBULIN SER-MCNC: 4.2 GM/DL (ref 2.4–3.5)
GLUCOSE SERPL-MCNC: 170 MG/DL (ref 82–115)
HCT VFR BLD AUTO: 34.5 % (ref 37–47)
HGB BLD-MCNC: 10.8 G/DL (ref 12–16)
IMM GRANULOCYTES # BLD AUTO: 0.02 X10(3)/MCL (ref 0–0.04)
IMM GRANULOCYTES NFR BLD AUTO: 0.3 %
IRON SATN MFR SERPL: 12 % (ref 20–50)
IRON SERPL-MCNC: 33 UG/DL (ref 50–170)
LYMPHOCYTES # BLD AUTO: 2.43 X10(3)/MCL (ref 0.6–4.6)
LYMPHOCYTES NFR BLD AUTO: 37.3 %
MCH RBC QN AUTO: 26.8 PG (ref 27–31)
MCHC RBC AUTO-ENTMCNC: 31.3 G/DL (ref 33–36)
MCV RBC AUTO: 85.6 FL (ref 80–94)
MONOCYTES # BLD AUTO: 0.65 X10(3)/MCL (ref 0.1–1.3)
MONOCYTES NFR BLD AUTO: 10 %
NEUTROPHILS # BLD AUTO: 3.01 X10(3)/MCL (ref 2.1–9.2)
NEUTROPHILS NFR BLD AUTO: 46.2 %
PLATELET # BLD AUTO: 230 X10(3)/MCL (ref 130–400)
PMV BLD AUTO: 9.4 FL (ref 7.4–10.4)
POTASSIUM SERPL-SCNC: 4.6 MMOL/L (ref 3.5–5.1)
PROT SERPL-MCNC: 7.6 GM/DL (ref 5.8–7.6)
RBC # BLD AUTO: 4.03 X10(6)/MCL (ref 4.2–5.4)
SODIUM SERPL-SCNC: 140 MMOL/L (ref 136–145)
TIBC SERPL-MCNC: 241 UG/DL (ref 70–310)
TIBC SERPL-MCNC: 274 UG/DL (ref 250–450)
TRANSFERRIN SERPL-MCNC: 233 MG/DL (ref 173–360)
VIT B12 SERPL-MCNC: 1085 PG/ML (ref 213–816)
WBC # BLD AUTO: 6.51 X10(3)/MCL (ref 4.5–11.5)

## 2025-04-02 PROCEDURE — 99215 OFFICE O/P EST HI 40 MIN: CPT | Mod: S$GLB,,,

## 2025-04-02 PROCEDURE — 36415 COLL VENOUS BLD VENIPUNCTURE: CPT

## 2025-04-02 PROCEDURE — G2211 COMPLEX E/M VISIT ADD ON: HCPCS | Mod: S$GLB,,,

## 2025-04-02 PROCEDURE — 83550 IRON BINDING TEST: CPT

## 2025-04-02 PROCEDURE — 1159F MED LIST DOCD IN RCRD: CPT | Mod: CPTII,S$GLB,,

## 2025-04-02 PROCEDURE — 80053 COMPREHEN METABOLIC PANEL: CPT

## 2025-04-02 PROCEDURE — 82728 ASSAY OF FERRITIN: CPT

## 2025-04-02 PROCEDURE — 3078F DIAST BP <80 MM HG: CPT | Mod: CPTII,S$GLB,,

## 2025-04-02 PROCEDURE — 1126F AMNT PAIN NOTED NONE PRSNT: CPT | Mod: CPTII,S$GLB,,

## 2025-04-02 PROCEDURE — 1160F RVW MEDS BY RX/DR IN RCRD: CPT | Mod: CPTII,S$GLB,,

## 2025-04-02 PROCEDURE — 3075F SYST BP GE 130 - 139MM HG: CPT | Mod: CPTII,S$GLB,,

## 2025-04-02 PROCEDURE — 99999 PR PBB SHADOW E&M-EST. PATIENT-LVL V: CPT | Mod: PBBFAC,,,

## 2025-04-02 PROCEDURE — 82607 VITAMIN B-12: CPT

## 2025-04-02 PROCEDURE — 85025 COMPLETE CBC W/AUTO DIFF WBC: CPT

## 2025-04-02 RX ORDER — LETROZOLE 2.5 MG/1
2.5 TABLET, FILM COATED ORAL DAILY
Qty: 30 TABLET | Refills: 2 | Status: SHIPPED | OUTPATIENT
Start: 2025-04-02 | End: 2026-04-02

## 2025-04-04 ENCOUNTER — RESULTS FOLLOW-UP (OUTPATIENT)
Dept: HEMATOLOGY/ONCOLOGY | Facility: CLINIC | Age: 80
End: 2025-04-04

## 2025-04-08 DIAGNOSIS — I10 ESSENTIAL HYPERTENSION: ICD-10-CM

## 2025-04-08 RX ORDER — AMLODIPINE BESYLATE 5 MG/1
5 TABLET ORAL DAILY
Qty: 90 TABLET | Refills: 3 | Status: SHIPPED | OUTPATIENT
Start: 2025-04-08 | End: 2026-04-08

## 2025-04-08 RX ORDER — OLMESARTAN MEDOXOMIL 40 MG/1
40 TABLET ORAL DAILY
Qty: 90 TABLET | Refills: 1 | Status: SHIPPED | OUTPATIENT
Start: 2025-04-08 | End: 2026-04-08

## 2025-04-09 ENCOUNTER — OFFICE VISIT (OUTPATIENT)
Dept: NEUROLOGY | Facility: CLINIC | Age: 80
End: 2025-04-09
Payer: MEDICARE

## 2025-04-09 VITALS
WEIGHT: 164 LBS | HEIGHT: 63 IN | DIASTOLIC BLOOD PRESSURE: 68 MMHG | BODY MASS INDEX: 29.06 KG/M2 | SYSTOLIC BLOOD PRESSURE: 124 MMHG

## 2025-04-09 DIAGNOSIS — G30.1 SEVERE LATE ONSET ALZHEIMER'S DEMENTIA WITH MOOD DISTURBANCE: Primary | ICD-10-CM

## 2025-04-09 DIAGNOSIS — F02.C3 SEVERE LATE ONSET ALZHEIMER'S DEMENTIA WITH MOOD DISTURBANCE: Primary | ICD-10-CM

## 2025-04-09 PROCEDURE — 99215 OFFICE O/P EST HI 40 MIN: CPT | Mod: S$GLB,,, | Performed by: SPECIALIST

## 2025-04-09 PROCEDURE — 99999 PR PBB SHADOW E&M-EST. PATIENT-LVL IV: CPT | Mod: PBBFAC,,, | Performed by: SPECIALIST

## 2025-04-09 PROCEDURE — 1159F MED LIST DOCD IN RCRD: CPT | Mod: CPTII,S$GLB,, | Performed by: SPECIALIST

## 2025-04-09 PROCEDURE — 3078F DIAST BP <80 MM HG: CPT | Mod: CPTII,S$GLB,, | Performed by: SPECIALIST

## 2025-04-09 PROCEDURE — 3074F SYST BP LT 130 MM HG: CPT | Mod: CPTII,S$GLB,, | Performed by: SPECIALIST

## 2025-04-09 RX ORDER — ALPRAZOLAM 0.5 MG/1
0.5 TABLET ORAL 2 TIMES DAILY PRN
COMMUNITY

## 2025-04-09 NOTE — PROGRESS NOTES
"Subjective:         Patient ID: Tara Parker is a 79 y.o. female.    Chief Complaint/HPI:   Chief Complaint   Patient presents with    F/U AD.      Memory is worse. Forgets names, places and conversations. Does not drive, lives at home w her son.Sleeping well. Has anxiety and agitation. Pts son (Shaka) is here today.        Addn HPI:            ...  notes may also be on facesheet for HPI, ROS, and other sections   ROS:           Lives with ___ son  __Does not drive       Current Outpatient Medications   Medication Instructions    ALPRAZolam (XANAX) 0.5 mg, 2 times daily PRN    amLODIPine (NORVASC) 5 mg, Oral, Daily    aspirin (ECOTRIN) 81 mg, Oral, Daily    blood sugar diagnostic Strp To check BG 3 times daily, to use with insurance preferred meter    blood-glucose meter kit To check BG 3 times daily, to use with insurance preferred meter    blood-glucose sensor (FREESTYLE NISHA 3 SENSOR) Suzanne 1 Device, Misc.(Non-Drug; Combo Route), Every 14 days    cholecalciferol (vitamin D3) (VITAMIN D3) 2,000 Units, Oral, Daily    clonazePAM (KLONOPIN) 0.5 mg, Oral, 2 times daily PRN    cloNIDine (CATAPRES) 0.1 mg, Oral, As needed (PRN)    ibuprofen (ADVIL,MOTRIN) 200 mg, Daily PRN    JARDIANCE 25 mg tablet Daily PRN    ketorolac 0.5% (ACULAR) 0.5 % Drop 1 drop, 4 times daily    lancets Misc To check BG 3 times daily, to use with insurance preferred meter    LANTUS SOLOSTAR U-100 INSULIN 30 Units, Subcutaneous, Daily    letrozole (FEMARA) 2.5 mg, Oral, Daily    memantine (NAMENDA) 10 mg, Oral, Daily    OLANZapine (ZYPREXA) 10 mg, Nightly    OLANZapine (ZYPREXA) 5 mg, Daily    olmesartan (BENICAR) 40 mg, Oral, Daily    prednisoLONE acetate (PRED FORTE) 1 % DrpS 1 drop, 2 times daily    rosuvastatin (CRESTOR) 10 mg, Oral, Daily    sertraline (ZOLOFT) 50 mg, Oral, Daily    traMADoL (ULTRAM) 50 mg, Oral, Every 8 hours PRN      Objective:      Exam  /68 (BP Location: Left arm, Patient Position: Sitting)   Ht 5' 3" (1.6 m)   " Wt 74.4 kg (164 lb)   BMI 29.05 kg/m²   General:   If Accompanied, by__ son  heart:   pharynx:  Neurological   Speech: Ok   vis fields:    EOMs:    funduscopic:   Motor:     coord:     Gait:  Slow   ok for age    not parkinsonian     Neuroimaging:  My prior comments: 5.23 b mri gen atrophy    Labs:    Complexity of Data:     [] High  [x] Moderate   Risks:   [x] High   [] Moderate   MDM:      [x] High   [] Moderate  despite no orders         Assessment/Plan:         ICD-10-CM ICD-9-CM   1. Severe late onset Alzheimer's dementia with mood disturbance  G30.1 331.0    F02.C3 294.11     Other comments/ follow up:      Stressed to her as son video'd that she should allow family to care for her 24/7 and that if family becomes unable to continue she would need to be in a facility     6 mo    f to f      sooner prn   No orders of the defined types were placed in this encounter.            Juan David Zapata MD [facility]

## 2025-04-15 ENCOUNTER — LAB VISIT (OUTPATIENT)
Dept: LAB | Facility: HOSPITAL | Age: 80
End: 2025-04-15
Attending: FAMILY MEDICINE
Payer: MEDICARE

## 2025-04-15 DIAGNOSIS — Z79.4 TYPE 2 DIABETES MELLITUS WITH DIABETIC PERIPHERAL ANGIOPATHY WITHOUT GANGRENE, WITH LONG-TERM CURRENT USE OF INSULIN: ICD-10-CM

## 2025-04-15 DIAGNOSIS — E11.51 TYPE 2 DIABETES MELLITUS WITH DIABETIC PERIPHERAL ANGIOPATHY WITHOUT GANGRENE, WITH LONG-TERM CURRENT USE OF INSULIN: ICD-10-CM

## 2025-04-15 LAB
ALBUMIN SERPL-MCNC: 3.6 G/DL (ref 3.4–4.8)
ALBUMIN/GLOB SERPL: 1 RATIO (ref 1.1–2)
ALP SERPL-CCNC: 125 UNIT/L (ref 40–150)
ALT SERPL-CCNC: 11 UNIT/L (ref 0–55)
ANION GAP SERPL CALC-SCNC: 5 MEQ/L
AST SERPL-CCNC: 24 UNIT/L (ref 11–45)
BASOPHILS # BLD AUTO: 0.05 X10(3)/MCL
BASOPHILS NFR BLD AUTO: 0.8 %
BILIRUB SERPL-MCNC: 0.2 MG/DL
BUN SERPL-MCNC: 40.2 MG/DL (ref 9.8–20.1)
CALCIUM SERPL-MCNC: 9.1 MG/DL (ref 8.4–10.2)
CHLORIDE SERPL-SCNC: 107 MMOL/L (ref 98–107)
CHOLEST SERPL-MCNC: 263 MG/DL
CHOLEST/HDLC SERPL: 4 {RATIO} (ref 0–5)
CO2 SERPL-SCNC: 27 MMOL/L (ref 23–31)
CREAT SERPL-MCNC: 1.35 MG/DL (ref 0.55–1.02)
CREAT/UREA NIT SERPL: 30
EOSINOPHIL # BLD AUTO: 0.2 X10(3)/MCL (ref 0–0.9)
EOSINOPHIL NFR BLD AUTO: 3.2 %
ERYTHROCYTE [DISTWIDTH] IN BLOOD BY AUTOMATED COUNT: 15.4 % (ref 11.5–17)
EST. AVERAGE GLUCOSE BLD GHB EST-MCNC: 188.6 MG/DL
GFR SERPLBLD CREATININE-BSD FMLA CKD-EPI: 40 ML/MIN/1.73/M2
GLOBULIN SER-MCNC: 3.7 GM/DL (ref 2.4–3.5)
GLUCOSE SERPL-MCNC: 134 MG/DL (ref 82–115)
HBA1C MFR BLD: 8.2 %
HCT VFR BLD AUTO: 35.5 % (ref 37–47)
HDLC SERPL-MCNC: 66 MG/DL (ref 35–60)
HGB BLD-MCNC: 11.1 G/DL (ref 12–16)
IMM GRANULOCYTES # BLD AUTO: 0.02 X10(3)/MCL (ref 0–0.04)
IMM GRANULOCYTES NFR BLD AUTO: 0.3 %
LDLC SERPL CALC-MCNC: 182 MG/DL (ref 50–140)
LYMPHOCYTES # BLD AUTO: 2.61 X10(3)/MCL (ref 0.6–4.6)
LYMPHOCYTES NFR BLD AUTO: 41.2 %
MCH RBC QN AUTO: 26.8 PG (ref 27–31)
MCHC RBC AUTO-ENTMCNC: 31.3 G/DL (ref 33–36)
MCV RBC AUTO: 85.7 FL (ref 80–94)
MONOCYTES # BLD AUTO: 0.67 X10(3)/MCL (ref 0.1–1.3)
MONOCYTES NFR BLD AUTO: 10.6 %
NEUTROPHILS # BLD AUTO: 2.79 X10(3)/MCL (ref 2.1–9.2)
NEUTROPHILS NFR BLD AUTO: 43.9 %
NRBC BLD AUTO-RTO: 0 %
PLATELET # BLD AUTO: 195 X10(3)/MCL (ref 130–400)
PMV BLD AUTO: 10.4 FL (ref 7.4–10.4)
POTASSIUM SERPL-SCNC: 4.5 MMOL/L (ref 3.5–5.1)
PROT SERPL-MCNC: 7.3 GM/DL (ref 5.8–7.6)
RBC # BLD AUTO: 4.14 X10(6)/MCL (ref 4.2–5.4)
SODIUM SERPL-SCNC: 139 MMOL/L (ref 136–145)
TRIGL SERPL-MCNC: 73 MG/DL (ref 37–140)
VLDLC SERPL CALC-MCNC: 15 MG/DL
WBC # BLD AUTO: 6.34 X10(3)/MCL (ref 4.5–11.5)

## 2025-04-15 PROCEDURE — 83036 HEMOGLOBIN GLYCOSYLATED A1C: CPT

## 2025-04-15 PROCEDURE — 85025 COMPLETE CBC W/AUTO DIFF WBC: CPT

## 2025-04-15 PROCEDURE — 80061 LIPID PANEL: CPT

## 2025-04-15 PROCEDURE — 80053 COMPREHEN METABOLIC PANEL: CPT

## 2025-04-15 PROCEDURE — 36415 COLL VENOUS BLD VENIPUNCTURE: CPT

## 2025-04-16 ENCOUNTER — RESULTS FOLLOW-UP (OUTPATIENT)
Dept: FAMILY MEDICINE | Facility: CLINIC | Age: 80
End: 2025-04-16

## 2025-04-16 ENCOUNTER — OFFICE VISIT (OUTPATIENT)
Dept: FAMILY MEDICINE | Facility: CLINIC | Age: 80
End: 2025-04-16
Payer: MEDICARE

## 2025-04-16 VITALS
HEART RATE: 66 BPM | WEIGHT: 162.38 LBS | OXYGEN SATURATION: 99 % | RESPIRATION RATE: 16 BRPM | BODY MASS INDEX: 28.77 KG/M2 | DIASTOLIC BLOOD PRESSURE: 88 MMHG | SYSTOLIC BLOOD PRESSURE: 130 MMHG | HEIGHT: 63 IN

## 2025-04-16 DIAGNOSIS — E11.51 TYPE 2 DIABETES MELLITUS WITH DIABETIC PERIPHERAL ANGIOPATHY WITHOUT GANGRENE, WITH LONG-TERM CURRENT USE OF INSULIN: ICD-10-CM

## 2025-04-16 DIAGNOSIS — I10 ESSENTIAL HYPERTENSION: ICD-10-CM

## 2025-04-16 DIAGNOSIS — Z79.4 TYPE 2 DIABETES MELLITUS WITH DIABETIC PERIPHERAL ANGIOPATHY WITHOUT GANGRENE, WITH LONG-TERM CURRENT USE OF INSULIN: ICD-10-CM

## 2025-04-16 DIAGNOSIS — G30.1 SEVERE LATE ONSET ALZHEIMER'S DEMENTIA WITH MOOD DISTURBANCE: Primary | ICD-10-CM

## 2025-04-16 DIAGNOSIS — F02.C3 SEVERE LATE ONSET ALZHEIMER'S DEMENTIA WITH MOOD DISTURBANCE: Primary | ICD-10-CM

## 2025-04-16 DIAGNOSIS — E78.2 MIXED HYPERLIPIDEMIA: ICD-10-CM

## 2025-04-16 PROCEDURE — 3079F DIAST BP 80-89 MM HG: CPT | Mod: CPTII,,, | Performed by: FAMILY MEDICINE

## 2025-04-16 PROCEDURE — 1126F AMNT PAIN NOTED NONE PRSNT: CPT | Mod: CPTII,,, | Performed by: FAMILY MEDICINE

## 2025-04-16 PROCEDURE — 1159F MED LIST DOCD IN RCRD: CPT | Mod: CPTII,,, | Performed by: FAMILY MEDICINE

## 2025-04-16 PROCEDURE — 1160F RVW MEDS BY RX/DR IN RCRD: CPT | Mod: CPTII,,, | Performed by: FAMILY MEDICINE

## 2025-04-16 PROCEDURE — G2211 COMPLEX E/M VISIT ADD ON: HCPCS | Mod: ,,, | Performed by: FAMILY MEDICINE

## 2025-04-16 PROCEDURE — 1101F PT FALLS ASSESS-DOCD LE1/YR: CPT | Mod: CPTII,,, | Performed by: FAMILY MEDICINE

## 2025-04-16 PROCEDURE — 3288F FALL RISK ASSESSMENT DOCD: CPT | Mod: CPTII,,, | Performed by: FAMILY MEDICINE

## 2025-04-16 PROCEDURE — 99214 OFFICE O/P EST MOD 30 MIN: CPT | Mod: ,,, | Performed by: FAMILY MEDICINE

## 2025-04-16 PROCEDURE — 3075F SYST BP GE 130 - 139MM HG: CPT | Mod: CPTII,,, | Performed by: FAMILY MEDICINE

## 2025-04-16 RX ORDER — ROSUVASTATIN CALCIUM 20 MG/1
20 TABLET, COATED ORAL DAILY
Qty: 90 TABLET | Refills: 3 | Status: SHIPPED | OUTPATIENT
Start: 2025-04-16 | End: 2026-04-16

## 2025-04-28 ENCOUNTER — TELEPHONE (OUTPATIENT)
Dept: FAMILY MEDICINE | Facility: CLINIC | Age: 80
End: 2025-04-28
Payer: MEDICARE

## 2025-04-28 DIAGNOSIS — Z12.11 COLON CANCER SCREENING: Primary | ICD-10-CM

## 2025-04-28 NOTE — TELEPHONE ENCOUNTER
Gastro called because of pt's age  recommends pt do a FIT or a stool test for blood. Pt does not quality for cologuard due to age out at 65.  office stated if stool or Fit test comes back positive then they will proceed with colonoscopy.

## 2025-04-28 NOTE — PROGRESS NOTES
1. Colon cancer screening (Primary)  Orders placed for fit test.  - Fecal Immunochemical Test (iFOBT); Future  - Fecal Immunochemical Test (iFOBT)

## 2025-05-01 DIAGNOSIS — F32.A ANXIETY AND DEPRESSION: ICD-10-CM

## 2025-05-01 DIAGNOSIS — F41.9 ANXIETY AND DEPRESSION: ICD-10-CM

## 2025-05-01 RX ORDER — SERTRALINE HYDROCHLORIDE 50 MG/1
50 TABLET, FILM COATED ORAL DAILY
Qty: 90 TABLET | Refills: 1 | Status: SHIPPED | OUTPATIENT
Start: 2025-05-01 | End: 2025-10-28

## 2025-05-13 DIAGNOSIS — C50.912 INVASIVE DUCTAL CARCINOMA OF BREAST, FEMALE, LEFT: Primary | ICD-10-CM

## 2025-05-13 DIAGNOSIS — D64.9 ANEMIA, UNSPECIFIED TYPE: ICD-10-CM

## 2025-05-15 ENCOUNTER — OFFICE VISIT (OUTPATIENT)
Dept: HEMATOLOGY/ONCOLOGY | Facility: CLINIC | Age: 80
End: 2025-05-15
Payer: MEDICARE

## 2025-05-15 ENCOUNTER — LAB VISIT (OUTPATIENT)
Dept: LAB | Facility: HOSPITAL | Age: 80
End: 2025-05-15
Attending: STUDENT IN AN ORGANIZED HEALTH CARE EDUCATION/TRAINING PROGRAM
Payer: MEDICARE

## 2025-05-15 VITALS
DIASTOLIC BLOOD PRESSURE: 72 MMHG | WEIGHT: 159.69 LBS | BODY MASS INDEX: 28.29 KG/M2 | HEART RATE: 71 BPM | HEIGHT: 63 IN | RESPIRATION RATE: 18 BRPM | OXYGEN SATURATION: 97 % | SYSTOLIC BLOOD PRESSURE: 157 MMHG | TEMPERATURE: 98 F

## 2025-05-15 DIAGNOSIS — D64.9 ANEMIA, UNSPECIFIED TYPE: ICD-10-CM

## 2025-05-15 DIAGNOSIS — D50.9 IRON DEFICIENCY ANEMIA, UNSPECIFIED IRON DEFICIENCY ANEMIA TYPE: Primary | ICD-10-CM

## 2025-05-15 DIAGNOSIS — C50.912 INVASIVE DUCTAL CARCINOMA OF BREAST, FEMALE, LEFT: ICD-10-CM

## 2025-05-15 LAB
ALBUMIN SERPL-MCNC: 3.3 G/DL (ref 3.4–4.8)
ALBUMIN/GLOB SERPL: 0.8 RATIO (ref 1.1–2)
ALP SERPL-CCNC: 118 UNIT/L (ref 40–150)
ALT SERPL-CCNC: 12 UNIT/L (ref 0–55)
ANION GAP SERPL CALC-SCNC: 7 MEQ/L
AST SERPL-CCNC: 23 UNIT/L (ref 11–45)
BASOPHILS # BLD AUTO: 0.02 X10(3)/MCL
BASOPHILS NFR BLD AUTO: 0.3 %
BILIRUB SERPL-MCNC: 0.2 MG/DL
BUN SERPL-MCNC: 28 MG/DL (ref 9.8–20.1)
CALCIUM SERPL-MCNC: 9.1 MG/DL (ref 8.4–10.2)
CHLORIDE SERPL-SCNC: 109 MMOL/L (ref 98–107)
CO2 SERPL-SCNC: 22 MMOL/L (ref 23–31)
CREAT SERPL-MCNC: 1.22 MG/DL (ref 0.55–1.02)
CREAT/UREA NIT SERPL: 23
EOSINOPHIL # BLD AUTO: 0.12 X10(3)/MCL (ref 0–0.9)
EOSINOPHIL NFR BLD AUTO: 2.1 %
ERYTHROCYTE [DISTWIDTH] IN BLOOD BY AUTOMATED COUNT: 13.9 % (ref 11.5–17)
GFR SERPLBLD CREATININE-BSD FMLA CKD-EPI: 45 ML/MIN/1.73/M2
GLOBULIN SER-MCNC: 4 GM/DL (ref 2.4–3.5)
GLUCOSE SERPL-MCNC: 164 MG/DL (ref 82–115)
HCT VFR BLD AUTO: 34 % (ref 37–47)
HGB BLD-MCNC: 11.1 G/DL (ref 12–16)
IMM GRANULOCYTES # BLD AUTO: 0 X10(3)/MCL (ref 0–0.04)
IMM GRANULOCYTES NFR BLD AUTO: 0 %
LYMPHOCYTES # BLD AUTO: 2.37 X10(3)/MCL (ref 0.6–4.6)
LYMPHOCYTES NFR BLD AUTO: 41.4 %
MCH RBC QN AUTO: 27.4 PG (ref 27–31)
MCHC RBC AUTO-ENTMCNC: 32.6 G/DL (ref 33–36)
MCV RBC AUTO: 84 FL (ref 80–94)
MONOCYTES # BLD AUTO: 0.47 X10(3)/MCL (ref 0.1–1.3)
MONOCYTES NFR BLD AUTO: 8.2 %
NEUTROPHILS # BLD AUTO: 2.74 X10(3)/MCL (ref 2.1–9.2)
NEUTROPHILS NFR BLD AUTO: 48 %
PLATELET # BLD AUTO: 185 X10(3)/MCL (ref 130–400)
PMV BLD AUTO: 9.7 FL (ref 7.4–10.4)
POTASSIUM SERPL-SCNC: 4.3 MMOL/L (ref 3.5–5.1)
PROT SERPL-MCNC: 7.3 GM/DL (ref 5.8–7.6)
RBC # BLD AUTO: 4.05 X10(6)/MCL (ref 4.2–5.4)
SODIUM SERPL-SCNC: 138 MMOL/L (ref 136–145)
WBC # BLD AUTO: 5.72 X10(3)/MCL (ref 4.5–11.5)

## 2025-05-15 PROCEDURE — 85025 COMPLETE CBC W/AUTO DIFF WBC: CPT

## 2025-05-15 PROCEDURE — 99999 PR PBB SHADOW E&M-EST. PATIENT-LVL IV: CPT | Mod: PBBFAC,,,

## 2025-05-15 PROCEDURE — 80053 COMPREHEN METABOLIC PANEL: CPT

## 2025-05-15 PROCEDURE — 36415 COLL VENOUS BLD VENIPUNCTURE: CPT

## 2025-05-15 RX ORDER — FERROUS GLUCONATE 324(38)MG
324 TABLET ORAL
Qty: 30 TABLET | Refills: 3 | Status: SHIPPED | OUTPATIENT
Start: 2025-05-15

## 2025-05-15 RX ORDER — FERROUS GLUCONATE 324(38)MG
324 TABLET ORAL
Qty: 30 TABLET | Refills: 3 | Status: SHIPPED | OUTPATIENT
Start: 2025-05-15 | End: 2025-05-15

## 2025-05-15 RX ORDER — LETROZOLE 2.5 MG/1
2.5 TABLET, FILM COATED ORAL DAILY
Qty: 90 TABLET | Refills: 0 | Status: SHIPPED | OUTPATIENT
Start: 2025-05-15 | End: 2026-05-15

## 2025-05-15 NOTE — PROGRESS NOTES
Subjective:       Patient ID: Tara Parker is a 79 y.o. female.    Chief Complaint: Follow up    Diagnosis: Stage IA Left Breast Cancer(Hormone Positive)    Current Treatment: Plan to start Letrozole    Treatment History:   11/8/2024 L breast partial mastectomy and SLNB - Dr. 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 She had an abnormal screening MMG in August '24. Follow up imaging revealed an 9mm mass at the 5 o'clock position, 5CMFN. Biopsy of mass revealed invasive ductal carcinoma with mucinous features, grade 1, ER 94%, WV 93%, HER2 2+, FISH negative. She has dementia and her son is POA. She ultimately underwent left breast lumpectomy with Dr. Mena on 11/8/24 where final pathology showed 10mm IDC, G1, negative margins 0/2 lymph nodes involved with metastatic cancer. She has seen radiation and ultimately decided not to proceed with XRT therapy. She presents to medical oncology for further evaluation with her son, POA.     Her diagnosis, stage, and prognosis were discussed. The NCCN guidelines when discussing her treatment recommendations moving forward were referenced.     The mechanism, benefits, and side effect profile of Letrozole was reviewed with patient. The benefits described include reduced risk of contralateral breast cancer and reduced risk of distant metastatic disease. Side effects discussed include joint pain, hot flashes, vaginal dryness, arthralgias and loss of bone mineral density. The need for supplemental calcium and vitamin D were discussed. Osteopenia resulted on her most recent DEXA. Planned to start Prolia q 6month. However, she and her son would rather wait for now given her age and poly-medicine. Her son Shaka would like to research medication further prior to proceeding. Prolia and Letrozole drug information was provided to patient and son. She and her son are fully aware of increased risks for fracture  with starting Letrozole. Will follow up in upcoming visit.     Interval History:   Patient here today for 6 week NP follow up appointment and labs for toxicity check after starting Letrozole.   Overall she states she is feeling well.   Tolerating Letrozole without issue.   Today, she have no complaints.   She denies hot flashes, arthralgias, vaginal dryness or recurrent UTI's  She also denies abnormal bruising or bleeding.   Otherwise no other issues or concerns      Past Medical History:   Diagnosis Date    Anxiety and depression 04/08/2023    Dementia     Essential hypertension 04/08/2023    Mixed hyperlipidemia 04/08/2023    Osteopenia of multiple sites 04/08/2023    Type 2 diabetes mellitus with diabetic peripheral angiopathy without gangrene, with long-term current use of insulin 04/08/2023      Past Surgical History:   Procedure Laterality Date    BREAST BIOPSY      EYE SURGERY  09-    GI obstruction N/A     HYSTERECTOMY      MASTECTOMY, PARTIAL Left 11/8/2024    Procedure: MASTECTOMY, PARTIAL // Left / Need Sentimag need US Need Faxitron Need sterile charms;  Surgeon: Allie Mena MD;  Location: [facility];  Service: General;  Laterality: Left;  Need Sentimag  need US  Need Faxitron  Need sterile charms    PHACOEMULSIFICATION, CATARACT, WITH IOL INSERTION Left 06/04/2024    Procedure: PHACOEMULSIFICATION, CATARACT, WITH IOL INSERTION;  Surgeon: Jose Daniel Villarreal MD;  Location: [facility];  Service: Ophthalmology;  Laterality: Left;  3rd 0700    PHACOEMULSIFICATION, CATARACT, WITH IOL INSERTION Right 09/03/2024    Procedure: PHACOEMULSIFICATION, CATARACT, WITH IOL INSERTION;  Surgeon: Jose Daniel Villarreal MD;  Location: [facility];  Service: Ophthalmology;  Laterality: Right;  2nd 0630    SENTINEL LYMPH NODE BIOPSY Left 11/8/2024    Procedure: BIOPSY, LYMPH NODE, SENTINEL // Left /need nuc med need gabrielle node;  Surgeon: Allie Mena MD;  Location: [facility] OR;  Service: General;  Laterality: Left;  need nuc  med  need gabrielle node    TUBAL LIGATION       Social History     Socioeconomic History    Marital status:     Number of children: 3   Occupational History    Occupation: Retired: [employer]   Tobacco Use    Smoking status: Never    Smokeless tobacco: Never   Substance and Sexual Activity    Alcohol use: Never    Drug use: Never    Sexual activity: Not Currently     Partners: Male     Birth control/protection: None     Social Drivers of Health     Financial Resource Strain: Low Risk  (5/8/2024)    Overall Financial Resource Strain (CARDIA)     Difficulty of Paying Living Expenses: Not hard at all   Food Insecurity: No Food Insecurity (5/8/2024)    Hunger Vital Sign     Worried About Running Out of Food in the Last Year: Never true     Ran Out of Food in the Last Year: Never true   Transportation Needs: No Transportation Needs (5/8/2024)    TRANSPORTATION NEEDS     Transportation : No   Physical Activity: Sufficiently Active (5/8/2024)    Exercise Vital Sign     Days of Exercise per Week: 7 days     Minutes of Exercise per Session: 60 min   Stress: No Stress Concern Present (5/8/2024)    [employer] [employer] - Occupational Stress Questionnaire     Feeling of Stress : Not at all   Housing Stability: Unknown (5/8/2024)    Housing Stability Vital Sign     Unable to Pay for Housing in the Last Year: No     Homeless in the Last Year: No      Family History   Problem Relation Name Age of Onset    Coronary artery disease Mother Ferdinand Bazan. 85    Breast cancer Mother Ferdinand Bazan.     Cancer Mother Ferdinand Bazan.     Diabetes Mother Ferdinand Bazan.     Arthritis Mother Ferdinand Bazan.     Lung disease Father  77        Chronic Obstructive Lung Disease    Depression Son Shaka gudino        Review of patient's allergies indicates:  No Known Allergies   Review of Systems   Constitutional:  Negative for activity change, fever and unexpected weight change.   HENT:  Negative for sore throat.    Eyes:  Negative  for visual disturbance.   Respiratory:  Negative for cough and shortness of breath.    Cardiovascular:  Negative for chest pain.   Gastrointestinal:  Negative for abdominal pain, diarrhea, nausea and vomiting.   Endocrine: Negative for polyuria.   Genitourinary:  Negative for dysuria and hot flashes.   Integumentary:  Negative for rash.   Neurological:  Negative for weakness and headaches.   Hematological:  Negative for adenopathy.   Psychiatric/Behavioral:  Negative for confusion.          Objective:      Vitals:    05/15/25 0942   BP: (!) 157/72   Pulse: 71   Resp: 18   Temp: 98.1 °F (36.7 °C)       Physical Exam  Constitutional:       General: She is not in acute distress.     Appearance: Normal appearance. She is not ill-appearing.   HENT:      Head: Normocephalic and atraumatic.      Nose: Nose normal.      Mouth/Throat:      Mouth: Mucous membranes are moist.      Pharynx: Oropharynx is clear.   Eyes:      Extraocular Movements: Extraocular movements intact.      Conjunctiva/sclera: Conjunctivae normal.      Pupils: Pupils are equal, round, and reactive to light.   Cardiovascular:      Rate and Rhythm: Normal rate and regular rhythm.      Pulses: Normal pulses.      Heart sounds: Normal heart sounds. No murmur heard.  Pulmonary:      Effort: Pulmonary effort is normal. No respiratory distress.      Breath sounds: Normal breath sounds.   Abdominal:      General: There is no distension.      Palpations: Abdomen is soft.      Tenderness: There is no abdominal tenderness.   Musculoskeletal:         General: Normal range of motion.      Cervical back: Normal range of motion and neck supple.      Right lower leg: No edema.      Left lower leg: No edema.   Lymphadenopathy:      Cervical: No cervical adenopathy.   Skin:     General: Skin is warm and dry.   Neurological:      General: No focal deficit present.      Mental Status: She is alert and oriented to person, place, and time.   BREAST;  No abnormalities found.  Incision to left axilla completely healed.        5/15/2025   [facility] PEQ ENDOCRINE THERAPY   I have hot flashes/hot flushes 0   I have vaginal discharge 0   I have vaginal itching/irritation 0   I have vaginal bleeding or spotting 0   I have vaginal dryness 0   I have pain or discomfort with intercourse 0   I have lost interest in sex 3   I have gained weight 0   I have mood swings 1   I am irritable 1   I have pain in my joints 0       LABS AND IMAGING REVIEWED IN EPIC    Imaging:   Bilateral screening mammogram 08/29/2024:  Density B.  BI-RADS 0.  Mass in the left breast at 6:00, posterior depth.  Asymmetry in the left breast lateral region middle depth on CC view.  No suspicious findings in the right breast.  Left diagnostic mammogram 09/05/2024:  Density B.  BI-RADS 5.  Central inferior left breast, posterior depth there is a persistent 0.8 cm mass with spiculated margins.  On ultrasound this measures 0.8 x 0.3 x 0.5 cm.  In the 4:00 left breast, 2 cm from the nipple there is a 0.4 cm benign complicated cyst.    Ultrasound of the left axilla demonstrates no significant left axillary adenopathy.  Ultrasound-guided biopsy left breast 09/30/2024:  Mass at 5:00, 5 cm from the nipple: Successful.  Coil clip placed.  Good position.       8/29/24 Dexa:  Impression:  Osteopenia        Pathology:   Left breast 5:00, 5 cm from nipple mass core biopsy 09/30/2024: Invasive ductal carcinoma with mucinous features, well differentiated, grade 1, ER 93%, MO 93%, HER2 2+, fish negative.  L partial mastectomy and SLNB 11/8/24: IDC with partial mucinous features, G1, 10mm.  Margins negative. Closest margin posterior at 6mm. Two lymph nodes removed and both negative for tumor.     Assessment:   Stage 1 Left Breast Cancer - pT1bN0. G1. S/p lumpectomy 11/24. No role for adjuvant XRT. Plan to proceed with AI therapy for 5 years.     Osteopenia; Planned to start Prolia q 6month. However, she and her son would rather wait for now given her  age and poly-medicine    YUNIER; Patient denies abnormal bruising or bleeding. Her PCP is planning for FIT test and if positive will proceed to GI for colonoscopy. Will start oral iron supplementation given her alzheimer's dementia, her son would rather give oral than IV. Plan for IV if experience GI irritation.     Dexa due: Aug '26  Plan:    - Toxicity reviewed; Letrozole 2.5mg PO Daily; refill sent today  - Continue Calcium and Vitamin D supplementation  -  Planned to start Prolia q 6month. However, she and her son would rather wait for now given her age and poly-medicine  - MMG left breast overdue; ordered at last visit however not scheduled. Will have team reach out to patient for scheduling  - Okay to start oral iron daily; given patient has severe late onset alzheimer's dementia her son would rather give oral than IV. Plan for IV if experience GI irritation  - RTC 3 months for NP visit, labs same day    I spent a total of 40 minutes on the day of the visit.This includes face to face time and non-face to face time preparing to see the patient (eg, review of tests), obtaining and/or reviewing separately obtained history, documenting clinical information in the electronic or other health record, independently interpreting results and communicating results to the patient/family/caregiver, or care coordinator.    Visit today included increased complexity associated with the care of the episodic problem breast cancer, addressing and managing the longitudinal care of the patient's Stage 1 Left Breast Cancer.     RAQUEL Musa-C  Hematology/Oncology   [facility]

## 2025-06-12 DIAGNOSIS — Z79.4 TYPE 2 DIABETES MELLITUS WITH DIABETIC PERIPHERAL ANGIOPATHY WITHOUT GANGRENE, WITH LONG-TERM CURRENT USE OF INSULIN: Primary | ICD-10-CM

## 2025-06-12 DIAGNOSIS — E11.51 TYPE 2 DIABETES MELLITUS WITH DIABETIC PERIPHERAL ANGIOPATHY WITHOUT GANGRENE, WITH LONG-TERM CURRENT USE OF INSULIN: Primary | ICD-10-CM

## 2025-06-12 RX ORDER — INSULIN GLARGINE-YFGN 100 [IU]/ML
INJECTION, SOLUTION SUBCUTANEOUS
Qty: 9 ML | Refills: 0 | Status: SHIPPED | OUTPATIENT
Start: 2025-06-12

## 2025-07-04 DIAGNOSIS — G30.1 MODERATE LATE ONSET ALZHEIMER'S DEMENTIA WITHOUT BEHAVIORAL DISTURBANCE, PSYCHOTIC DISTURBANCE, MOOD DISTURBANCE, OR ANXIETY: ICD-10-CM

## 2025-07-04 DIAGNOSIS — F02.B0 MODERATE LATE ONSET ALZHEIMER'S DEMENTIA WITHOUT BEHAVIORAL DISTURBANCE, PSYCHOTIC DISTURBANCE, MOOD DISTURBANCE, OR ANXIETY: ICD-10-CM

## 2025-07-04 DIAGNOSIS — D50.9 IRON DEFICIENCY ANEMIA, UNSPECIFIED IRON DEFICIENCY ANEMIA TYPE: ICD-10-CM

## 2025-07-05 NOTE — PROGRESS NOTES
Patient ID: [id number]     Chief Complaint: Diabetes      HPI:     Tara Parker is a 79 y.o. female here today with her son for follow up:   Has been visiting her niece in [place] for the last 1 month-did not complete labs prior to visit.  History obtained from patient and son.   1) Type 2 DM: Patient's blood sugars has been regularly monitored with freestyle Justyna-blood sugars have normalized since she has been back from [place].  2) Hypertension: Patient has been taking Norvasc 5 mg/Benicar 40 mg-blood pressure is consistently monitored at home-on occasions elevated-patient does use clonidine as needed.  3) Hyperlipidemia:  Has been taking Crestor 20 mg no side effects related to the medication.  4) Dementia: Is continuing medications-taking Klonopin every 2-3 days-no side effects related to the medication.       Past Medical History:   Diagnosis Date    Anxiety and depression 04/08/2023    Dementia     Essential hypertension 04/08/2023    Mixed hyperlipidemia 04/08/2023    Osteopenia of multiple sites 04/08/2023    Type 2 diabetes mellitus with diabetic peripheral angiopathy without gangrene, with long-term current use of insulin 04/08/2023        Past Surgical History:   Procedure Laterality Date    BREAST BIOPSY      EYE SURGERY  09-    GI obstruction N/A     HYSTERECTOMY      MASTECTOMY, PARTIAL Left 11/8/2024    Procedure: MASTECTOMY, PARTIAL // Left / Need Sentimag need US Need Faxitron Need sterile charms;  Surgeon: Allie Mena MD;  Location: [facility] OR;  Service: General;  Laterality: Left;  Need Sentimag  need US  Need Faxitron  Need sterile charms    PHACOEMULSIFICATION, CATARACT, WITH IOL INSERTION Left 06/04/2024    Procedure: PHACOEMULSIFICATION, CATARACT, WITH IOL INSERTION;  Surgeon: Jose Daniel Villarreal MD;  Location: [facility] OR;  Service: Ophthalmology;  Laterality: Left;  3rd 0700    PHACOEMULSIFICATION, CATARACT, WITH IOL INSERTION Right 09/03/2024    Procedure: PHACOEMULSIFICATION,  CATARACT, WITH IOL INSERTION;  Surgeon: Jose Daniel Villarreal MD;  Location[facility] OR;  Service: Ophthalmology;  Laterality: Right;  2nd 0630    SENTINEL LYMPH NODE BIOPSY Left 11/8/2024    Procedure: BIOPSY, LYMPH NODE, SENTINEL // Left /need nuc med need gabrielle node;  Surgeon: Allie Mena MD;  Location: [facility];  Service: General;  Laterality: Left;  need nuc med  need gabrielle node    TUBAL LIGATION          Social History[1]     Social History[2]     Current Outpatient Medications   Medication Instructions    ALPRAZolam (XANAX) 0.5 mg, 2 times daily PRN    amLODIPine (NORVASC) 5 mg, Oral, Daily    aspirin (ECOTRIN) 81 mg, Oral, Daily    blood sugar diagnostic Strp To check BG 3 times daily, to use with insurance preferred meter    blood-glucose meter kit To check BG 3 times daily, to use with insurance preferred meter    blood-glucose sensor (FREESTYLE NISHA 3 SENSOR) Suzanne 1 Device, Misc.(Non-Drug; Combo Route), Every 14 days    clonazePAM (KLONOPIN) 0.5 MG tablet TAKE 1 TABLET BY MOUTH TWICE DAILY AS NEEDED FOR ANXIETY (AGAITATION)    cloNIDine (CATAPRES) 0.1 mg, Oral, As needed (PRN)    ferrous gluconate (FERGON) 324 mg, Oral, With breakfast    ibuprofen (ADVIL,MOTRIN) 200 mg, Daily PRN    JARDIANCE 25 mg tablet Daily PRN    lancets Misc To check BG 3 times daily, to use with insurance preferred meter    LANTUS SOLOSTAR U-100 INSULIN 30 Units, Subcutaneous, Daily    letrozole (FEMARA) 2.5 mg, Oral, Daily    memantine (NAMENDA) 10 mg, Oral, Daily    olmesartan (BENICAR) 40 mg, Oral, Daily    rosuvastatin (CRESTOR) 20 mg, Oral, Daily    SEMGLEE,INSULIN GLARG-YFGN, unit/mL (3 mL) InPn INJECT 30 UNITS SUBCUTANEOUSLY ONCE DAILY    sertraline (ZOLOFT) 50 mg, Oral, Daily       Review of patient's allergies indicates:  No Known Allergies     Patient Care Team:  Lilliana De La Torre MD as PCP - General (Family Medicine)  Juan David Zapata MD as Consulting Physician (Neurology)  Roscoe Boyce MD as Consulting Physician  "(Cardiology)  Wendy Hussein RD as Diabetes Educator (Diabetes)  Allie Mena MD as Consulting Physician (Breast Surgery)       Subjective:     Review of Systems    12 point review of systems conducted, negative except as stated in the history of present illness. See HPI for details.      Objective:     Visit Vitals  /69 (BP Location: Right arm, Patient Position: Sitting)   Pulse 76   Resp 16   Ht 5' 3" (1.6 m)   Wt 72 kg (158 lb 12.8 oz)   SpO2 96%   BMI 28.13 kg/m²       Physical Exam    General: Alert and oriented, No acute distress.  Head: Normocephalic, Atraumatic.  Eye: Pupils are equal, round and reactive to light, Extraocular movements are intact, Sclera non-icteric.  Ears/Nose/Throat: Normal, Mucosa moist,Clear.  Neck/Thyroid: Supple, Non-tender  Respiratory: Clear to auscultation bilaterally  Cardiovascular: S1/S2 normal  Gastrointestinal: Soft, Non-tender, Non-distended, Normal bowel sounds, No palpable organomegaly.  Musculoskeletal: Normal range of motion.  Integumentary: Warm, Dry  Extremities: No clubbing, cyanosis or edema  Neurologic: No focal deficits, Cranial Nerves II-XII are grossly intact  Psychiatric: Normal interaction, Coherent speech       Assessment:       ICD-10-CM ICD-9-CM   1. Type 2 diabetes mellitus with diabetic peripheral angiopathy without gangrene, with long-term current use of insulin  E11.51 250.70    Z79.4 443.81     V58.67   2. Essential hypertension  I10 401.9   3. Mixed hyperlipidemia  E78.2 272.2   4. Stage 3b chronic kidney disease  N18.32 585.3   5. Iron deficiency anemia, unspecified iron deficiency anemia type  D50.9 280.9   6. Anxiety and depression  F41.9 300.00    F32.A 311   7. Severe late onset Alzheimer's dementia with mood disturbance  G30.1 331.0    F02.C3 294.11        Plan:     1. Type 2 diabetes mellitus with diabetic peripheral angiopathy without gangrene, with long-term current use of insulin (Primary)  Lab Results   Component Value Date    HGBA1C " 8.2 (H) 04/15/2025    HGBA1C 8.6 (H) 01/30/2025    HGBA1C 10.0 04/04/2023    .00 (H) 04/15/2025    CREATININE 1.22 (H) 05/15/2025    CREATININE 0.99 08/28/2023      Pathophysiology/treatment/dangers discussed.   Patient to continue insulin based on blood sugars.  Blood sugar goal of less than 120 fasting and 140-150 about 2 hours after meal.   Current HA1C was 8.2. Hemoglobin A1c needs to be rechecked in 2 months. Goal less than 6.5.  Follow ADA Diet. Avoid soda, simple sweets, and limit rice/pasta/breads/starches (no more than 45-50 grams per meal).  Maintain healthy weight with goal BMI <30.  Exercise 5 times per week for 30 minutes per day.  Stressed importance of daily foot exams.Stressed importance of annual dilated eye exam.    - Hemoglobin A1C; Future  - Microalbumin/Creatinine Ratio, Urine; Future  - Urinalysis, Reflex to Urine Culture Urine, Clean Catch; Future    2. Essential hypertension  Patient has been stable on Norvasc 5 mg/Benicar 40 mg-p.r.n. use of clonidine. Blood pressure goal of less than 130/80-blood pressure is stable. Continue to encourage diet and activity modifications. Including stress management. Pathophysiology/treatment/dangers discussed.      3. Mixed hyperlipidemia  Lab Results   Component Value Date    CHOL 263 (H) 04/15/2025    CHOL 260 (H) 08/28/2023    .00 (H) 04/15/2025    TRIG 73 04/15/2025    TRIG 129 08/28/2023    HDL 66 (H) 04/15/2025    HDL 54 08/28/2023     Pathophysiology/treatment/dangers discussed. Patient to continue diet modification-Crestor 20 mg-recheck labs in 2 months management based on finding.     - CBC Auto Differential; Future  - Comprehensive Metabolic Panel; Future  - Lipid Panel; Future  - TSH; Future    4. Stage 3b chronic kidney disease  Pathophysiology/Treatment/ Dangers Discussed.  Maximize management of risk factors including type 2 diabetes, hypertension, and hydration.     5. Iron deficiency anemia, unspecified iron deficiency anemia  type  Patient is comanage with Hematologist-continue with ferrous gluconate supplement  - ferrous gluconate (FERGON) 324 MG tablet; Take 1 tablet (324 mg total) by mouth daily with breakfast.  Dispense: 90 tablet; Refill: 0    6. Anxiety and depression  Patient is doing well on Zoloft 50 mg- continue to encourage lifestyle modifications including 20-30 minutes exercise daily.    7. Severe late onset Alzheimer's dementia with mood disturbance  Patient is comanage with Neurologist-continue Namenda-encourage lifestyle modification including community involvement.    Follow up if symptoms worsen or fail to improve, for Medicare Wellness. In addition to their scheduled follow up, the patient has also been instructed to follow up on as needed basis.     Future Appointments   Date Time Provider Department Center   8/18/2025  9:30 [facility]   8/18/2025 10:00 [facility]   9/2/2025 10:00 AM Lilliana De La Torre MD [facility]   10/28/2025  9:30 AM Juan David Zapata MD [facility] [address] [facility]        Lilliana De La Torre MD         [1]   Social History  Tobacco Use    Smoking status: Never    Smokeless tobacco: Never   Substance Use Topics    Alcohol use: Never    Drug use: Never   [2]   Social History  Socioeconomic History    Marital status:     Number of children: 3

## 2025-07-07 RX ORDER — FERROUS GLUCONATE 324(38)MG
324 TABLET ORAL
Qty: 30 TABLET | Refills: 3 | Status: SHIPPED | OUTPATIENT
Start: 2025-07-07 | End: 2025-07-10 | Stop reason: SDUPTHER

## 2025-07-07 RX ORDER — CLONAZEPAM 0.5 MG/1
TABLET ORAL
Qty: 60 TABLET | Refills: 0 | Status: SHIPPED | OUTPATIENT
Start: 2025-07-07

## 2025-07-10 ENCOUNTER — PATIENT OUTREACH (OUTPATIENT)
Facility: CLINIC | Age: 80
End: 2025-07-10
Payer: MEDICARE

## 2025-07-10 ENCOUNTER — OFFICE VISIT (OUTPATIENT)
Dept: FAMILY MEDICINE | Facility: CLINIC | Age: 80
End: 2025-07-10
Payer: MEDICARE

## 2025-07-10 VITALS
SYSTOLIC BLOOD PRESSURE: 131 MMHG | DIASTOLIC BLOOD PRESSURE: 69 MMHG | WEIGHT: 158.81 LBS | HEART RATE: 76 BPM | OXYGEN SATURATION: 96 % | RESPIRATION RATE: 16 BRPM | HEIGHT: 63 IN | BODY MASS INDEX: 28.14 KG/M2

## 2025-07-10 DIAGNOSIS — F32.A ANXIETY AND DEPRESSION: ICD-10-CM

## 2025-07-10 DIAGNOSIS — E11.51 TYPE 2 DIABETES MELLITUS WITH DIABETIC PERIPHERAL ANGIOPATHY WITHOUT GANGRENE, WITH LONG-TERM CURRENT USE OF INSULIN: Primary | ICD-10-CM

## 2025-07-10 DIAGNOSIS — D50.9 IRON DEFICIENCY ANEMIA, UNSPECIFIED IRON DEFICIENCY ANEMIA TYPE: ICD-10-CM

## 2025-07-10 DIAGNOSIS — F02.C3 SEVERE LATE ONSET ALZHEIMER'S DEMENTIA WITH MOOD DISTURBANCE: ICD-10-CM

## 2025-07-10 DIAGNOSIS — Z79.4 TYPE 2 DIABETES MELLITUS WITH DIABETIC PERIPHERAL ANGIOPATHY WITHOUT GANGRENE, WITH LONG-TERM CURRENT USE OF INSULIN: Primary | ICD-10-CM

## 2025-07-10 DIAGNOSIS — N18.32 STAGE 3B CHRONIC KIDNEY DISEASE: ICD-10-CM

## 2025-07-10 DIAGNOSIS — G30.1 SEVERE LATE ONSET ALZHEIMER'S DEMENTIA WITH MOOD DISTURBANCE: ICD-10-CM

## 2025-07-10 DIAGNOSIS — I10 ESSENTIAL HYPERTENSION: ICD-10-CM

## 2025-07-10 DIAGNOSIS — F41.9 ANXIETY AND DEPRESSION: ICD-10-CM

## 2025-07-10 DIAGNOSIS — E78.2 MIXED HYPERLIPIDEMIA: ICD-10-CM

## 2025-07-10 PROCEDURE — 1159F MED LIST DOCD IN RCRD: CPT | Mod: CPTII,,, | Performed by: FAMILY MEDICINE

## 2025-07-10 PROCEDURE — 99214 OFFICE O/P EST MOD 30 MIN: CPT | Mod: ,,, | Performed by: FAMILY MEDICINE

## 2025-07-10 PROCEDURE — 3078F DIAST BP <80 MM HG: CPT | Mod: CPTII,,, | Performed by: FAMILY MEDICINE

## 2025-07-10 PROCEDURE — 1101F PT FALLS ASSESS-DOCD LE1/YR: CPT | Mod: CPTII,,, | Performed by: FAMILY MEDICINE

## 2025-07-10 PROCEDURE — G2211 COMPLEX E/M VISIT ADD ON: HCPCS | Mod: ,,, | Performed by: FAMILY MEDICINE

## 2025-07-10 PROCEDURE — 3075F SYST BP GE 130 - 139MM HG: CPT | Mod: CPTII,,, | Performed by: FAMILY MEDICINE

## 2025-07-10 PROCEDURE — 1126F AMNT PAIN NOTED NONE PRSNT: CPT | Mod: CPTII,,, | Performed by: FAMILY MEDICINE

## 2025-07-10 PROCEDURE — 1160F RVW MEDS BY RX/DR IN RCRD: CPT | Mod: CPTII,,, | Performed by: FAMILY MEDICINE

## 2025-07-10 PROCEDURE — 3288F FALL RISK ASSESSMENT DOCD: CPT | Mod: CPTII,,, | Performed by: FAMILY MEDICINE

## 2025-07-10 RX ORDER — FERROUS GLUCONATE 324(38)MG
324 TABLET ORAL
Qty: 90 TABLET | Refills: 0 | Status: SHIPPED | OUTPATIENT
Start: 2025-07-10 | End: 2025-10-08

## 2025-07-10 NOTE — LETTER
AUTHORIZATION FOR RELEASE OF CONFIDENTIAL INFORMATION      07/10/2025    Dear Dr. Villarreal,    We are seeing Tara Parker, date of birth 1945, in the clinic at [facility].  Lilliana De La Torre MD is the patient's PCP.  Tara Parker has an outstanding lab/procedure at the time we reviewed his chart.  In order to help keep her health information updated, Tara has authorized us to request the following medical record(s):    Eye Exam - including evaluation for diabetic retinopathy    Or please comment on the following:    DATE EYE EXAM: __________________________    Significant Findings:  ________ No Diabetic Retinopathy  ________ Minimal Background Diabetic Retinopathy  ________ Moderate to Severe Background Diabetic Retinopathy  ________ Clinically Significant Macular Edema  ________ Proliferative Diabetic Retinopathy     Further Testing / Treatment:  ________ No Treatment  ________ Intravenous Fluorescein Angiogram  ________ Laser: Focal / Pan Retinal (PRP)  ________ Other: ______________________     Signature of Examining Provider: _____________________________    Printed name of provider: ____________________________________       Please fax any records to Lilliana De La Torre MD's at  [phone]      Patient Name: Tara Parker  :1945  Patient Phone #: [phone]                    Tara Parker  MRN: [id number]  : 1945  Age: 78 y.o.  Sex: female         Patient/Legal Guardian Signature  This signature was collected at 2024           _______________________________   Printed Name/Relationship to Patient      Consent for Examination and Treatment: I hereby authorize the providers and employees of [facility] ([facility]) to provide medical treatment/services which includes, but is not limited to, performing and administering tests and diagnostic procedures that are deemed necessary, including, but not limited to, imaging examinations, blood tests and other laboratory procedures  as may be required by the hospital, clinic, or may be ordered by my physician(s) or persons working under the general and/or special instructions of my physician(s).      I understand and agree that this consent covers all authorized persons, including but not limited to physicians, residents, nurse practitioners, physicians' assistants, specialists, consultants, student nurses, and independently contracted physicians, who are called upon by the physician in charge, to carry out the diagnostic procedures and medical or surgical treatment.     I hereby authorize [facility] to retain or dispose of any specimens or tissue, should there be such remaining from any test or procedure.     I hereby authorize and give consent for [facility] providers and employees to take photographs, images or videotapes of such diagnostic, surgical or treatment procedures of Patient as may be required by [facility] or as may be ordered by a physician. I further acknowledge and agree that [facility] may use cameras or other devices for patient monitoring.     I am aware that the practice of medicine is not an exact science, and I acknowledge that no guarantees have been made to me as to the outcome of any tests, procedures or treatment.     Authorization for Release of Information: I understand that my insurance company and/or their agents may need information necessary to make determinations about payment/reimbursement. I hereby provide authorization to release to all insurance companies, their successors, assignees, other parties with whom they may have contracted, or others acting on their behalf, that are involved with payment for any hospital and/or clinic charges incurred by the patient, any information that they request and deem necessary for payment/reimbursement, and/or quality review.  I further authorize the release of my health information to physicians or other health care practitioners on staff who are involved in my health care now and  in the future, and to other health care providers, entities, or institutions for the purpose of my continued care and treatment, including referrals.     REGISTRATION AUTHORIZATION  Form No. 94090 (Rev. 3/25/2024)    Page 1 of 3                       Medicare Patient's Certification and Authorization to Release Information and Payment Request:  I certify that the information given by me in applying for payment under Title XVIII of the Social Security Act is correct. I authorize any irwin of medical or other information about me to release to the Social SecurityAdministration, or its intermediaries or carriers, any information needed for this or a related Medicare claim. I request that payment of authorized benefits be made on my behalf.     Assignment of Insurance Benefits:   I hereby authorize any and all insurance companies, health plans, defined   benefit plans, health insurers or any entity that is or may be responsible for payment of my medical expenses to pay all hospital and medical benefits now due, and to become due and payable to me under any hospital benefits, sick benefits, injury benefits or any other benefit for services rendered to me, including Major Medical Benefits, direct to [facility] and all independently contracted physicians. I assign any and all rights that I may have against any and all insurance companies, health plans, defined benefit plans, health insurers or any entity that is or may be responsible for payment of my medical expenses, including, but not limited to any right to appeal a denial of a claim, any right to bring any action, lawsuit, administrative proceeding, or other cause of action on my behalf. I specifically assign my right to pursue litigation against any and all insurance companies, health plans, defined benefit plans, health insurers or any entity that is or may be responsible for payment of my medical expenses based upon a refusal to pay charges.            E. Valuables:  It is understood and agreed that [facility] is not liable for the damage to or loss of any money, jewelry,   documents, dentures, eye glasses, hearing aids, prosthetics, or other property of value.     F. Computer Equipment: I understand and agree that should I choose to use computer equipment owned by [facility] or if I choose to access the Internet via [facility]s network, I do so at my own risk. [facility] is not responsible for any damage to my computer equipment or to any damages of any type that might arise from my loss of equipment or data.     G. Acceptance of Financial Responsibility:  I agree that in consideration of the services and   supplies that have been   or will be furnished to the patient, I am hereby obligated to pay all charges made for or on the account of the patient according to the standard rates (in effect at the time the services and supplies are delivered) established by [facility], including its Patient Financial Assistance Policy to the extent it is applicable. I understand that I am responsible for all charges, or portions thereof, not covered by insurance or other sources. Patient refunds will be distributed only after balances at all [facility] facilities are paid.     H. Communication Authorization:  I hereby authorize [facility] and its representatives, along with any billing service   or  who may work on their behalf, to contact me on   my cell phone and/or home phone using pre- recorded messages, artificial voice messages, automatic telephone dialing devices or other computer assisted technology, or by electronic      mail, text messaging, or by any other form of electronic communication. This includes, but is not limited to, appointment reminders, yearly physical exam reminders, preventive care reminders, patient campaigns, welcome calls, and calls about account balances on my account or any account on which I am listed as a guarantor. I understand I have the right to opt out of  these communications at any time.      Relationship  Between  Facility and  Provider:      I understand that some, but not all, providers furnishing services to the patient are not employees or agents of [facility]. The patient is under the care and supervision of his/her attending physician, and it is the responsibility of the facility and its nursing staff to carry out the instructions of such physicians. It is the responsibility of the patient's physician/designee to obtain the patient's informed consent, when required, for medical or surgical treatment, special diagnostic or therapeutic procedures, or hospital services rendered for the patient under the special instructions of the physician/designee.           REGISTRATION AUTHORIZATION  Form No. 12614 (Rev. 3/25/2024)    Page 2 of 3                       Immunizations: [facility] shares immunization information with state sponsored health departments to help you and your doctor keep track of your immunization records. By signing, you consent to have this information shared with the health department in your state:                                [place] - LINKS ([place] Immunization Network for Kids Statewide)                                [place] - MIIX ([place] Immunization Information eXchange)                                [place] - ImmPRINT (Immunization Patient Registry with Integrated Technology)     TERM: This authorization is valid for this and subsequent care/treatment I receive at [facility] and will remain valid unless/until revoked in writing by me.     [facility]: As used in this document, [facility] means all [facility] owned and managed facilities, including, but not limited to, all health centers, surgery centers, clinics, urgent care centers, and hospitals.         [facility] complies with applicable Federal civil rights laws and does not discriminate on the basis of race, color, national origin, age, disability, or  sex.  ATENCIÓN: si habla español, tiene a lopes disposición servicios gratuitos de asistencia lingüística. Conchita al [phone].  CHÚ Ý: N?u b?n nói Ti?ng Vi?t, có các d?ch v? h? tr? ngôn ng? mi?n phí dành cho b?n. G?i s? [phone].        REGISTRATION AUTHORIZATION  Form No. 08394 (Rev. 3/25/2024)   Page 3 of 3

## 2025-07-10 NOTE — PROGRESS NOTES
Health Maintenance Topic(s) Outreach Outcomes & Actions Taken:    Eye Exam - Outreach Outcomes & Actions Taken  : External Records Requested & Care Team Updated if Applicable and CEE sent to Dr. Villarreal       Additional Notes:

## 2025-07-13 DIAGNOSIS — F32.A ANXIETY AND DEPRESSION: ICD-10-CM

## 2025-07-13 DIAGNOSIS — F41.9 ANXIETY AND DEPRESSION: ICD-10-CM

## 2025-07-13 DIAGNOSIS — E11.51 TYPE 2 DIABETES MELLITUS WITH DIABETIC PERIPHERAL ANGIOPATHY WITHOUT GANGRENE, WITH LONG-TERM CURRENT USE OF INSULIN: ICD-10-CM

## 2025-07-13 DIAGNOSIS — G30.1 MODERATE LATE ONSET ALZHEIMER'S DEMENTIA WITHOUT BEHAVIORAL DISTURBANCE, PSYCHOTIC DISTURBANCE, MOOD DISTURBANCE, OR ANXIETY: ICD-10-CM

## 2025-07-13 DIAGNOSIS — E78.2 MIXED HYPERLIPIDEMIA: ICD-10-CM

## 2025-07-13 DIAGNOSIS — F02.B0 MODERATE LATE ONSET ALZHEIMER'S DEMENTIA WITHOUT BEHAVIORAL DISTURBANCE, PSYCHOTIC DISTURBANCE, MOOD DISTURBANCE, OR ANXIETY: ICD-10-CM

## 2025-07-13 DIAGNOSIS — Z79.4 TYPE 2 DIABETES MELLITUS WITH DIABETIC PERIPHERAL ANGIOPATHY WITHOUT GANGRENE, WITH LONG-TERM CURRENT USE OF INSULIN: ICD-10-CM

## 2025-07-13 DIAGNOSIS — D50.9 IRON DEFICIENCY ANEMIA, UNSPECIFIED IRON DEFICIENCY ANEMIA TYPE: ICD-10-CM

## 2025-07-14 RX ORDER — LANCETS
EACH MISCELLANEOUS
Qty: 200 EACH | Refills: 2 | Status: SHIPPED | OUTPATIENT
Start: 2025-07-14

## 2025-07-14 RX ORDER — ROSUVASTATIN CALCIUM 20 MG/1
20 TABLET, COATED ORAL DAILY
Qty: 90 TABLET | Refills: 3 | Status: SHIPPED | OUTPATIENT
Start: 2025-07-14 | End: 2026-07-14

## 2025-07-14 RX ORDER — FERROUS GLUCONATE 324(38)MG
324 TABLET ORAL
Qty: 90 TABLET | Refills: 0 | Status: SHIPPED | OUTPATIENT
Start: 2025-07-14 | End: 2025-10-12

## 2025-07-14 RX ORDER — CLONAZEPAM 0.5 MG/1
0.5 TABLET ORAL 2 TIMES DAILY PRN
Qty: 60 TABLET | Refills: 0 | Status: SHIPPED | OUTPATIENT
Start: 2025-07-14 | End: 2025-08-13

## 2025-07-14 RX ORDER — SERTRALINE HYDROCHLORIDE 50 MG/1
50 TABLET, FILM COATED ORAL DAILY
Qty: 90 TABLET | Refills: 1 | Status: SHIPPED | OUTPATIENT
Start: 2025-07-14 | End: 2026-01-10

## 2025-07-23 DIAGNOSIS — Z79.4 TYPE 2 DIABETES MELLITUS WITH DIABETIC PERIPHERAL ANGIOPATHY WITHOUT GANGRENE, WITH LONG-TERM CURRENT USE OF INSULIN: ICD-10-CM

## 2025-07-23 DIAGNOSIS — E11.51 TYPE 2 DIABETES MELLITUS WITH DIABETIC PERIPHERAL ANGIOPATHY WITHOUT GANGRENE, WITH LONG-TERM CURRENT USE OF INSULIN: ICD-10-CM

## 2025-07-23 RX ORDER — BLOOD SUGAR DIAGNOSTIC
1 STRIP MISCELLANEOUS 3 TIMES DAILY
Qty: 200 EACH | Refills: 3 | Status: SHIPPED | OUTPATIENT
Start: 2025-07-23

## 2025-07-24 ENCOUNTER — DOCUMENTATION ONLY (OUTPATIENT)
Dept: FAMILY MEDICINE | Facility: CLINIC | Age: 80
End: 2025-07-24
Payer: MEDICARE

## 2025-08-14 DIAGNOSIS — C50.912 INVASIVE DUCTAL CARCINOMA OF BREAST, FEMALE, LEFT: ICD-10-CM

## 2025-08-14 DIAGNOSIS — E11.51 TYPE 2 DIABETES MELLITUS WITH DIABETIC PERIPHERAL ANGIOPATHY WITHOUT GANGRENE, WITH LONG-TERM CURRENT USE OF INSULIN: ICD-10-CM

## 2025-08-14 DIAGNOSIS — Z79.4 TYPE 2 DIABETES MELLITUS WITH DIABETIC PERIPHERAL ANGIOPATHY WITHOUT GANGRENE, WITH LONG-TERM CURRENT USE OF INSULIN: ICD-10-CM

## 2025-08-14 DIAGNOSIS — I10 ESSENTIAL HYPERTENSION: ICD-10-CM

## 2025-08-14 RX ORDER — ASPIRIN 81 MG/1
81 TABLET ORAL DAILY
Qty: 90 TABLET | Refills: 3 | Status: SHIPPED | OUTPATIENT
Start: 2025-08-14

## 2025-08-14 RX ORDER — LETROZOLE 2.5 MG/1
2.5 TABLET, FILM COATED ORAL DAILY
Qty: 90 TABLET | Refills: 0 | Status: SHIPPED | OUTPATIENT
Start: 2025-08-14 | End: 2026-08-14

## 2025-08-18 ENCOUNTER — LAB VISIT (OUTPATIENT)
Dept: LAB | Facility: HOSPITAL | Age: 80
End: 2025-08-18
Attending: STUDENT IN AN ORGANIZED HEALTH CARE EDUCATION/TRAINING PROGRAM
Payer: MEDICARE

## 2025-08-18 ENCOUNTER — OFFICE VISIT (OUTPATIENT)
Dept: HEMATOLOGY/ONCOLOGY | Facility: CLINIC | Age: 80
End: 2025-08-18
Payer: MEDICARE

## 2025-08-18 VITALS
SYSTOLIC BLOOD PRESSURE: 161 MMHG | DIASTOLIC BLOOD PRESSURE: 82 MMHG | WEIGHT: 161.63 LBS | TEMPERATURE: 98 F | RESPIRATION RATE: 18 BRPM | BODY MASS INDEX: 28.64 KG/M2 | HEART RATE: 72 BPM | OXYGEN SATURATION: 99 % | HEIGHT: 63 IN

## 2025-08-18 DIAGNOSIS — D50.9 IRON DEFICIENCY ANEMIA, UNSPECIFIED IRON DEFICIENCY ANEMIA TYPE: ICD-10-CM

## 2025-08-18 DIAGNOSIS — D64.9 ANEMIA, UNSPECIFIED TYPE: ICD-10-CM

## 2025-08-18 DIAGNOSIS — E11.51 TYPE 2 DIABETES MELLITUS WITH DIABETIC PERIPHERAL ANGIOPATHY WITHOUT GANGRENE, WITH LONG-TERM CURRENT USE OF INSULIN: ICD-10-CM

## 2025-08-18 DIAGNOSIS — C50.912 INVASIVE DUCTAL CARCINOMA OF BREAST, FEMALE, LEFT: ICD-10-CM

## 2025-08-18 DIAGNOSIS — Z79.4 TYPE 2 DIABETES MELLITUS WITH DIABETIC PERIPHERAL ANGIOPATHY WITHOUT GANGRENE, WITH LONG-TERM CURRENT USE OF INSULIN: ICD-10-CM

## 2025-08-18 DIAGNOSIS — C50.912 INVASIVE DUCTAL CARCINOMA OF BREAST, FEMALE, LEFT: Primary | ICD-10-CM

## 2025-08-18 DIAGNOSIS — R10.32 LEFT LOWER QUADRANT PAIN: ICD-10-CM

## 2025-08-18 LAB
ALBUMIN SERPL-MCNC: 3.3 G/DL (ref 3.4–4.8)
ALBUMIN/GLOB SERPL: 0.7 RATIO (ref 1.1–2)
ALP SERPL-CCNC: 122 UNIT/L (ref 40–150)
ALT SERPL-CCNC: 9 UNIT/L (ref 0–55)
ANION GAP SERPL CALC-SCNC: 8 MEQ/L
AST SERPL-CCNC: 19 UNIT/L (ref 11–45)
BASOPHILS # BLD AUTO: 0.04 X10(3)/MCL
BASOPHILS NFR BLD AUTO: 0.6 %
BILIRUB SERPL-MCNC: 0.2 MG/DL
BUN SERPL-MCNC: 32.1 MG/DL (ref 9.8–20.1)
CALCIUM SERPL-MCNC: 9.6 MG/DL (ref 8.4–10.2)
CHLORIDE SERPL-SCNC: 103 MMOL/L (ref 98–107)
CO2 SERPL-SCNC: 26 MMOL/L (ref 23–31)
CREAT SERPL-MCNC: 1.11 MG/DL (ref 0.55–1.02)
CREAT/UREA NIT SERPL: 29
EOSINOPHIL # BLD AUTO: 0.13 X10(3)/MCL (ref 0–0.9)
EOSINOPHIL NFR BLD AUTO: 2 %
ERYTHROCYTE [DISTWIDTH] IN BLOOD BY AUTOMATED COUNT: 14.9 % (ref 11.5–17)
EST. AVERAGE GLUCOSE BLD GHB EST-MCNC: 200.1 MG/DL
FERRITIN SERPL-MCNC: 237.24 NG/ML (ref 4.63–204)
GFR SERPLBLD CREATININE-BSD FMLA CKD-EPI: 51 ML/MIN/1.73/M2
GLOBULIN SER-MCNC: 4.5 GM/DL (ref 2.4–3.5)
GLUCOSE SERPL-MCNC: 187 MG/DL (ref 82–115)
HBA1C MFR BLD: 8.6 %
HCT VFR BLD AUTO: 35.3 % (ref 37–47)
HGB BLD-MCNC: 11.3 G/DL (ref 12–16)
IMM GRANULOCYTES # BLD AUTO: 0.03 X10(3)/MCL (ref 0–0.04)
IMM GRANULOCYTES NFR BLD AUTO: 0.5 %
IRON SATN MFR SERPL: 15 % (ref 20–50)
IRON SERPL-MCNC: 33 UG/DL (ref 50–170)
LYMPHOCYTES # BLD AUTO: 2.02 X10(3)/MCL (ref 0.6–4.6)
LYMPHOCYTES NFR BLD AUTO: 31.2 %
MCH RBC QN AUTO: 25.9 PG (ref 27–31)
MCHC RBC AUTO-ENTMCNC: 32 G/DL (ref 33–36)
MCV RBC AUTO: 80.8 FL (ref 80–94)
MONOCYTES # BLD AUTO: 0.56 X10(3)/MCL (ref 0.1–1.3)
MONOCYTES NFR BLD AUTO: 8.6 %
NEUTROPHILS # BLD AUTO: 3.7 X10(3)/MCL (ref 2.1–9.2)
NEUTROPHILS NFR BLD AUTO: 57.1 %
PLATELET # BLD AUTO: 227 X10(3)/MCL (ref 130–400)
PMV BLD AUTO: 8.9 FL (ref 7.4–10.4)
POTASSIUM SERPL-SCNC: 4.5 MMOL/L (ref 3.5–5.1)
PROT SERPL-MCNC: 7.8 GM/DL (ref 5.8–7.6)
RBC # BLD AUTO: 4.37 X10(6)/MCL (ref 4.2–5.4)
SODIUM SERPL-SCNC: 137 MMOL/L (ref 136–145)
TIBC SERPL-MCNC: 189 UG/DL (ref 70–310)
TIBC SERPL-MCNC: 222 UG/DL (ref 250–450)
TRANSFERRIN SERPL-MCNC: 201 MG/DL (ref 173–360)
VIT B12 SERPL-MCNC: 1072 PG/ML (ref 213–816)
WBC # BLD AUTO: 6.48 X10(3)/MCL (ref 4.5–11.5)

## 2025-08-18 PROCEDURE — 3079F DIAST BP 80-89 MM HG: CPT | Mod: CPTII,S$GLB,,

## 2025-08-18 PROCEDURE — 82728 ASSAY OF FERRITIN: CPT

## 2025-08-18 PROCEDURE — 80053 COMPREHEN METABOLIC PANEL: CPT

## 2025-08-18 PROCEDURE — 99215 OFFICE O/P EST HI 40 MIN: CPT | Mod: S$GLB,,,

## 2025-08-18 PROCEDURE — 99999 PR PBB SHADOW E&M-EST. PATIENT-LVL IV: CPT | Mod: PBBFAC,,,

## 2025-08-18 PROCEDURE — 1159F MED LIST DOCD IN RCRD: CPT | Mod: CPTII,S$GLB,,

## 2025-08-18 PROCEDURE — 83036 HEMOGLOBIN GLYCOSYLATED A1C: CPT

## 2025-08-18 PROCEDURE — 1126F AMNT PAIN NOTED NONE PRSNT: CPT | Mod: CPTII,S$GLB,,

## 2025-08-18 PROCEDURE — G2211 COMPLEX E/M VISIT ADD ON: HCPCS | Mod: S$GLB,,,

## 2025-08-18 PROCEDURE — 3077F SYST BP >= 140 MM HG: CPT | Mod: CPTII,S$GLB,,

## 2025-08-18 PROCEDURE — 83550 IRON BINDING TEST: CPT

## 2025-08-18 PROCEDURE — 82607 VITAMIN B-12: CPT

## 2025-08-18 PROCEDURE — 36415 COLL VENOUS BLD VENIPUNCTURE: CPT

## 2025-08-18 PROCEDURE — 1160F RVW MEDS BY RX/DR IN RCRD: CPT | Mod: CPTII,S$GLB,,

## 2025-08-18 PROCEDURE — 85025 COMPLETE CBC W/AUTO DIFF WBC: CPT

## 2025-08-26 ENCOUNTER — HOSPITAL ENCOUNTER (OUTPATIENT)
Dept: RADIOLOGY | Facility: HOSPITAL | Age: 80
Discharge: HOME | End: 2025-08-26
Payer: MEDICARE

## 2025-08-26 DIAGNOSIS — C50.912 INVASIVE DUCTAL CARCINOMA OF BREAST, FEMALE, LEFT: ICD-10-CM

## 2025-08-26 DIAGNOSIS — C50.512 MALIGNANT NEOPLASM OF LOWER-OUTER QUADRANT OF LEFT BREAST OF FEMALE, ESTROGEN RECEPTOR POSITIVE: ICD-10-CM

## 2025-08-26 DIAGNOSIS — Z17.0 MALIGNANT NEOPLASM OF LOWER-OUTER QUADRANT OF LEFT BREAST OF FEMALE, ESTROGEN RECEPTOR POSITIVE: ICD-10-CM

## 2025-08-26 PROCEDURE — 76642 ULTRASOUND BREAST LIMITED: CPT | Mod: TC,LT

## 2025-08-26 PROCEDURE — 76642 ULTRASOUND BREAST LIMITED: CPT | Mod: 26,LT,, | Performed by: RADIOLOGY

## 2025-08-26 PROCEDURE — 77066 DX MAMMO INCL CAD BI: CPT | Mod: TC

## 2025-09-02 ENCOUNTER — OFFICE VISIT (OUTPATIENT)
Dept: FAMILY MEDICINE | Facility: CLINIC | Age: 80
End: 2025-09-02
Payer: MEDICARE

## 2025-09-02 VITALS
OXYGEN SATURATION: 100 % | BODY MASS INDEX: 29.23 KG/M2 | HEIGHT: 63 IN | SYSTOLIC BLOOD PRESSURE: 138 MMHG | HEART RATE: 64 BPM | RESPIRATION RATE: 16 BRPM | DIASTOLIC BLOOD PRESSURE: 80 MMHG | WEIGHT: 165 LBS

## 2025-09-02 DIAGNOSIS — F32.A ANXIETY AND DEPRESSION: ICD-10-CM

## 2025-09-02 DIAGNOSIS — N18.32 STAGE 3B CHRONIC KIDNEY DISEASE: ICD-10-CM

## 2025-09-02 DIAGNOSIS — E78.2 MIXED HYPERLIPIDEMIA: ICD-10-CM

## 2025-09-02 DIAGNOSIS — M85.89 OSTEOPENIA OF MULTIPLE SITES: ICD-10-CM

## 2025-09-02 DIAGNOSIS — C50.912 INVASIVE DUCTAL CARCINOMA OF BREAST, FEMALE, LEFT: ICD-10-CM

## 2025-09-02 DIAGNOSIS — F41.9 ANXIETY AND DEPRESSION: ICD-10-CM

## 2025-09-02 DIAGNOSIS — Z79.4 TYPE 2 DIABETES MELLITUS WITH DIABETIC PERIPHERAL ANGIOPATHY WITHOUT GANGRENE, WITH LONG-TERM CURRENT USE OF INSULIN: ICD-10-CM

## 2025-09-02 DIAGNOSIS — C50.512 MALIGNANT NEOPLASM OF LOWER-OUTER QUADRANT OF LEFT BREAST OF FEMALE, ESTROGEN RECEPTOR POSITIVE: ICD-10-CM

## 2025-09-02 DIAGNOSIS — Z00.00 WELLNESS EXAMINATION: Primary | ICD-10-CM

## 2025-09-02 DIAGNOSIS — E11.51 TYPE 2 DIABETES MELLITUS WITH DIABETIC PERIPHERAL ANGIOPATHY WITHOUT GANGRENE, WITH LONG-TERM CURRENT USE OF INSULIN: ICD-10-CM

## 2025-09-02 DIAGNOSIS — G30.1 SEVERE LATE ONSET ALZHEIMER'S DEMENTIA WITH MOOD DISTURBANCE: ICD-10-CM

## 2025-09-02 DIAGNOSIS — Z17.0 MALIGNANT NEOPLASM OF LOWER-OUTER QUADRANT OF LEFT BREAST OF FEMALE, ESTROGEN RECEPTOR POSITIVE: ICD-10-CM

## 2025-09-02 DIAGNOSIS — F02.C3 SEVERE LATE ONSET ALZHEIMER'S DEMENTIA WITH MOOD DISTURBANCE: ICD-10-CM

## 2025-09-02 DIAGNOSIS — I10 ESSENTIAL HYPERTENSION: ICD-10-CM

## 2025-09-02 RX ORDER — LETROZOLE 2.5 MG/1
2.5 TABLET, FILM COATED ORAL DAILY
Qty: 90 TABLET | Refills: 0 | Status: SHIPPED | OUTPATIENT
Start: 2025-09-02 | End: 2026-09-02

## 2025-09-02 RX ORDER — BLOOD-GLUCOSE SENSOR
1 EACH MISCELLANEOUS
Qty: 3 EACH | Refills: 3 | Status: SHIPPED | OUTPATIENT
Start: 2025-09-02 | End: 2026-09-02

## (undated) DEVICE — LINER GLOVE POWDERFREE SZ 6.5

## (undated) DEVICE — ELECTRODE BLADE INSULATED 1 IN

## (undated) DEVICE — CLOSURE SKIN STERI STRIP 1/2X4

## (undated) DEVICE — SUPPORT ULNA NERVE PROTECTOR

## (undated) DEVICE — SOL IRRI STRL WATER 1000ML

## (undated) DEVICE — SUT 2/0 30IN SILK BLK BRAI

## (undated) DEVICE — SYR DISP LL 5CC

## (undated) DEVICE — ADHESIVE MASTISOL VIAL 48/BX

## (undated) DEVICE — ELECTRODE PATIENT RETURN DISP

## (undated) DEVICE — Device

## (undated) DEVICE — PROBE TRUNODE GAMMA HAND PIECE

## (undated) DEVICE — SUT MCRYL PLUS 4-0 PS2 27IN

## (undated) DEVICE — SYR 3ML LL 18GA 1.5IN

## (undated) DEVICE — APPLIER CLIP LIAGCLIP 9.375IN

## (undated) DEVICE — GLOVE BIOGEL ECLIPSE SZ 7.5

## (undated) DEVICE — SUT VICRYL 3-0 27 SH

## (undated) DEVICE — ADHESIVE DERMABOND ADVANCED

## (undated) DEVICE — NDL SURE-SNAP HYPO SAF 18GX1.5

## (undated) DEVICE — SHEILD EYE PROTCT BLUE FLEX

## (undated) DEVICE — SKIN MARKER STER DUAL TIP

## (undated) DEVICE — GLOVE BIOGEL ECLIPSE SZ 6.5

## (undated) DEVICE — APPLICATOR CHLORAPREP ORN 26ML

## (undated) DEVICE — DRESSING TRANS 4X4 TEGADERM

## (undated) DEVICE — GLOVE SENSICARE PI MICRO 6

## (undated) DEVICE — NDL HYPO REG 25G X 1 1/2

## (undated) DEVICE — SPONGE COTTON TRAY 4X4IN

## (undated) DEVICE — SPONGE LAP 18X18 PREWASHED

## (undated) DEVICE — CHARM MARGINMAP SPEC 5MM

## (undated) DEVICE — GLOVE SENSICARE PI SURG 6.5

## (undated) DEVICE — PENCIL SMOKE EVAC ROCKER 70MM

## (undated) DEVICE — STRIP MEDI WND CLSR 1/4X4IN

## (undated) DEVICE — NDL SYR 10ML 18X1.5 LL BLUNT

## (undated) DEVICE — BRA MARENA B 38-40 LG BEIGE